# Patient Record
Sex: FEMALE | Race: WHITE | Employment: UNEMPLOYED | URBAN - METROPOLITAN AREA
[De-identification: names, ages, dates, MRNs, and addresses within clinical notes are randomized per-mention and may not be internally consistent; named-entity substitution may affect disease eponyms.]

---

## 2019-09-04 LAB
GLU GEST SCREEN 1HR 50G: 122
HEMOGLOBIN: 11.1 G/DL (ref 11.7–15.7)

## 2019-09-20 ENCOUNTER — PRENATAL OFFICE VISIT (OUTPATIENT)
Dept: MIDWIFE SERVICES | Facility: CLINIC | Age: 34
End: 2019-09-20
Payer: COMMERCIAL

## 2019-09-20 VITALS
WEIGHT: 201.3 LBS | SYSTOLIC BLOOD PRESSURE: 131 MMHG | HEART RATE: 101 BPM | DIASTOLIC BLOOD PRESSURE: 72 MMHG | BODY MASS INDEX: 38.01 KG/M2 | TEMPERATURE: 98 F | HEIGHT: 61 IN

## 2019-09-20 DIAGNOSIS — Z34.93 PRENATAL CARE IN THIRD TRIMESTER: Primary | ICD-10-CM

## 2019-09-20 DIAGNOSIS — F43.10 PTSD (POST-TRAUMATIC STRESS DISORDER): ICD-10-CM

## 2019-09-20 DIAGNOSIS — Z23 NEED FOR TDAP VACCINATION: ICD-10-CM

## 2019-09-20 DIAGNOSIS — F33.1 MODERATE EPISODE OF RECURRENT MAJOR DEPRESSIVE DISORDER (H): ICD-10-CM

## 2019-09-20 PROBLEM — Z34.92 PRENATAL CARE IN SECOND TRIMESTER: Status: ACTIVE | Noted: 2019-09-20

## 2019-09-20 PROCEDURE — 90471 IMMUNIZATION ADMIN: CPT | Performed by: ADVANCED PRACTICE MIDWIFE

## 2019-09-20 PROCEDURE — 99207 ZZC FIRST OB VISIT: CPT | Performed by: ADVANCED PRACTICE MIDWIFE

## 2019-09-20 PROCEDURE — 90715 TDAP VACCINE 7 YRS/> IM: CPT | Performed by: ADVANCED PRACTICE MIDWIFE

## 2019-09-20 RX ORDER — PYRIDOXINE HCL (VITAMIN B6) 25 MG
25 TABLET ORAL DAILY
COMMUNITY
End: 2023-10-21

## 2019-09-20 RX ORDER — PRENATAL VIT/IRON FUM/FOLIC AC 27MG-0.8MG
1 TABLET ORAL DAILY
COMMUNITY
End: 2019-09-30

## 2019-09-20 RX ORDER — ACETAMINOPHEN 500 MG
500 TABLET ORAL EVERY 6 HOURS PRN
Qty: 1 BOTTLE | Refills: 3 | Status: SHIPPED | OUTPATIENT
Start: 2019-09-20 | End: 2023-10-21

## 2019-09-20 RX ORDER — ACETAMINOPHEN 500 MG
500-1000 TABLET ORAL EVERY 6 HOURS PRN
COMMUNITY
End: 2019-09-30

## 2019-09-20 RX ORDER — CHOLECALCIFEROL (VITAMIN D3) 50 MCG
1 TABLET ORAL DAILY
Qty: 90 TABLET | Refills: 3 | Status: SHIPPED | OUTPATIENT
Start: 2019-09-20 | End: 2023-10-21

## 2019-09-20 RX ORDER — PRENATAL VIT/IRON FUM/FOLIC AC 27MG-0.8MG
1 TABLET ORAL DAILY
Qty: 90 TABLET | Refills: 3 | Status: SHIPPED | OUTPATIENT
Start: 2019-09-20 | End: 2019-11-05

## 2019-09-20 RX ORDER — DOCUSATE SODIUM 100 MG/1
100 CAPSULE, LIQUID FILLED ORAL 2 TIMES DAILY
COMMUNITY
End: 2023-10-21

## 2019-09-20 RX ORDER — CITALOPRAM HYDROBROMIDE 20 MG/1
20 TABLET ORAL DAILY
COMMUNITY
End: 2019-10-03

## 2019-09-20 NOTE — PROGRESS NOTES
"is a  single  1 para 0  giving an EDC of 19 by ultrasound and LMP who presents for a transfer of care OB Visit.  She came from the [facility] CNMs.  She heard that our hospital was beautiful with big windows and that is why she transferred. This was not a planned pregnancy.  She is not a previous CNM patient.  FOB is not involved.  She has history of depression and PTSD.  She says that she is in \"crisis\" right now. She is living in supportive mental health housing and receiving regular therapy.  She denies any thoughts or plans of hurting herself or others.  Her main focus is her baby.    =========================================    INFECTION HISTORY  HIV: no  Hepatitis B: no  Hepatitis C: no  Tuberculosis: no    Herpes self: no  Herpes partner:  no  Chlamydia:  no  Gonorrhea:  no  HPV: no  BV:  no  Trichomonis:  no  Syphilis:  no  Chicken Pox:  no  ============================================    PERSONAL/SOCIAL HISTORY  Lives Mental Health Supportive housing through [employer] .  Employment: Unemployed.    =============================================    REVIEW OF SYSTEMS  CONSTITUTIONAL: Denies fever, chills and night sweats  DIET: Appetite is continue.  Eats a regular.    Plans to gain 25-35 pounds with her pregnancy.  SKIN: Denies changes and suspicious moles or lesions.  ENT: Denies blurred vision, hearing loss, tinnitus, frequent colds, epistaxis, hoarseness and tooth pain.    ENDOCRINE: Denies heat and cold intolerance, and polydypsia.    BREASTS:  Tender since LMP.  Denies discharge and lumps.   HEART/LUNGS: Denies dyspnea, wheezing, coughing and chest pain.  HEMATOLOGIC: Denies tendency to bruise and history of blood clots.  GASTROINTESTINAL: Denies heartburn, indigestion and change of color in stools.    GENITOURINARY:  Denies urgency, dysuria and hematuria.  Has frequency of urination since LMP.   NEUROLOGIC:  Denies seizures, weakness and fainting.  PSYCHIATRIC:  Endorses Depression  "   ===========================================    PHYSICAL EXAM  GENERAL:   pleasant pregnant female, alert, cooperative and well groomed.  Obese.  SKIN:  Warm and dry, without lesions or tenderness.    HEAD: Symmetrical features.  EYES:  PERRLA, wears glasses.  EARS: Tympanic membranes gray, translucent and intact.  NOSE: No flaring, patent  MOUTH:  Buccal mucosa pink, moist without lesions.  Teeth in fair repair.    NECK:  Thyroid without enlargement and nodules.  Lymph nodes not palpable.   LUNGS:  Clear to auscultation.  BREAST:  Symmetrical without lesions or nodes.  Nipples everted.  Areolas symmetric.  No palpable axillary nodes.  HEART:  RRR without murmur.  ABDOMEN: Soft without masses or tenderness.  No CVA tenderness.  Uterus palpable at size equal to dates.   Measures size greater than dates due to maternal adipose tissue.    MUSCULOSKELETAL:  Full range of motion  GENITALIA:  Pelvic exam deferred   EXTREMITIES:  2+/2+ DTR, No edema. No significant varicosities.  ===================================================    Prenatal care  Maternal obesity   Depression   PTSD     PLAN:  Instructed on use of triage nurse line and contacting the on call CNM after hours in an emergency.    She has anatomy US at [facility].   Vits and meds ordered for her at  Pharmacy per her request.  Discussed the risks, benefits, side effects and alternative therapies for current prescribed and OTC medications.  Will return to the clinic in 2 weeks for her next routine prenatal check.  Will call to be seen sooner if problem arise.  Continue with mental health treatment and services.  We reviewed to go to ED for suicidal thoughts and plans.  Oriented to CNM Service and added to list.      Yue Robles, JONATHAN, APRN, CNM

## 2019-09-24 ENCOUNTER — PRENATAL OFFICE VISIT (OUTPATIENT)
Dept: MIDWIFE SERVICES | Facility: CLINIC | Age: 34
End: 2019-09-24
Payer: COMMERCIAL

## 2019-09-24 VITALS
DIASTOLIC BLOOD PRESSURE: 75 MMHG | WEIGHT: 201 LBS | SYSTOLIC BLOOD PRESSURE: 138 MMHG | HEART RATE: 106 BPM | TEMPERATURE: 97.1 F | BODY MASS INDEX: 37.98 KG/M2

## 2019-09-24 DIAGNOSIS — Z59.00 HOUSING LACK: ICD-10-CM

## 2019-09-24 DIAGNOSIS — O99.891 BACK PAIN IN PREGNANCY: ICD-10-CM

## 2019-09-24 DIAGNOSIS — M54.9 BACK PAIN IN PREGNANCY: ICD-10-CM

## 2019-09-24 DIAGNOSIS — Z34.93 PRENATAL CARE IN THIRD TRIMESTER: Primary | ICD-10-CM

## 2019-09-24 DIAGNOSIS — F32.1 CURRENT MODERATE EPISODE OF MAJOR DEPRESSIVE DISORDER, UNSPECIFIED WHETHER RECURRENT (H): ICD-10-CM

## 2019-09-24 PROCEDURE — 99207 ZZC PRENATAL VISIT: CPT | Performed by: ADVANCED PRACTICE MIDWIFE

## 2019-09-24 SDOH — ECONOMIC STABILITY - HOUSING INSECURITY: HOMELESSNESS UNSPECIFIED: Z59.00

## 2019-09-24 NOTE — LETTER
[facility]  [address] [place] [id number] [phone]      September 24, 2019      Sveta Cuellar  [address]  [facility] [address]              To Whom It May Concern:    Sveta Cuellar is being seen in our clinic for prenatal care.  Her Estimated Date of Delivery: Nov 30, 2019. Ms. Cuellar is in her 3rd trimester of pregnancy and experiencing insomnia due to stress.   It is safe in the pregnancy to take Unisom  mg at night.  Please allow Ms. Cuellar to take 2-3 tablets of Unisom at nighttime as I feel this would help her stress to have better sleep.  If any questions please call [phone].      Sincerely,        Coco Kincaid CNM

## 2019-09-24 NOTE — PROGRESS NOTES
30w3d  Patient is very stressed today.  Has been in crisis shelter and needs to leave in 2 days, feeling desperate as she does not have anywhere to go.  Pt states not sleeping at all because of stress.  She has called lots of places.  Willing to have  call her for any and all assistance.  Pt having lower abdominal cramping, generalized, some days more than others, given maternity belt to help with this not likely  labor.  Reports some right carpal tunnel that his bothersome using brace at night and tylanol.  Would really like to sleep, letter written for shelter to allow her to take 75 mg unisom for sleep as this has worked in the past. Pt states they will only give her 25 mg and this does nothing.   Care cordination referral given. rtc in 2 weeks to assess stress.  vaishnavi

## 2019-09-24 NOTE — Clinical Note
Aretha, saw this patient at end of the day, she seems very desperate and will be kicked out of shelter in 2 days.  Please call her as soon as you can. I gave her your card too.   Thanks, Coco Kincaid CNM

## 2019-09-26 ENCOUNTER — PATIENT OUTREACH (OUTPATIENT)
Dept: CARE COORDINATION | Facility: CLINIC | Age: 34
End: 2019-09-26

## 2019-09-26 NOTE — PROGRESS NOTES
Clinic Care Coordination Contact    Clinic Care Coordination Contact  OUTREACH    Referral Information:  Referral Source: Specialist    Primary Diagnosis: Psychosocial    Chief Complaint   Patient presents with     Clinic Care Coordination - Initial             Universal Utilization: [facility]   Clinic Utilization  Difficulty keeping appointments:: Yes  Compliance Concerns: Yes  No-Show Concerns: Yes  No PCP office visit in Past Year: No  Utilization    Last refreshed: 9/25/2019 11:55 AM:  Hospital Admissions 0           Last refreshed: 9/25/2019 11:55 AM:  ED Visits 0           Last refreshed: 9/25/2019 11:55 AM:  No Show Count (past year) 1              Current as of: 9/25/2019 11:55 AM              Clinical Concerns:  Current Medical Concerns:  Pt reports that she is 30 weeks pregnant.     Current Behavioral Concerns: Pt discussed there were currently many stressors occurring in her life. Pt shared that she would like to find additional housing resources. She she is getting discharged from one shelter today back to [facility]. Pt shared she is not exactly eager to go back there, but it is better than being out on the streets. Pt shared she is seeking assistance with any resources for her baby going forward.   Education Provided to patient: Pt will be calling pt on 9/26 to discuss more options of resources along with meeting at pt's next in clinic appt on 9/30/19.    Pain  Pain (GOAL):: No  Health Maintenance Reviewed: Due/Overdue   Health Maintenance Due   Topic Date Due     PREVENTIVE CARE VISIT  1985     DEPRESSION ACTION PLAN  1985     PHQ-9  1985     HPV  09/19/2006     MATERNAL SCREENING  06/08/2019     REPEAT ANTIBODY SCREEN (OB)  09/07/2019     RH IMMUNE GLOBULIN (OB)  09/07/2019       Clinical Pathway: None    Medication Management:  Pt has medications managed by PCP.      Functional Status:       Living Situation: Pt is currently  homeless. She will be staying in a shelter for the time being and work towards getting into permanent housing. k       Diet/Exercise/Sleep:  Inadequate nutrition (GOAL):: No  Food Insecurity: No  Tube Feeding: No  Inadequate activity/exercise (GOAL):: No  Significant changes in sleep pattern (GOAL): No    Transportation:  Transportation concerns (GOAL):: No  Transportation means:: Medical transport, Public transportation     Psychosocial:        Financial/Insurance:   Financial/Insurance concerns (GOAL):: No  Pt receives Social Security and SNAP.      Resources and Interventions:  Current Resources:    ;                         Goals:         Patient/Caregiver understanding: Pt will create goals with SW in the clinic.      Outreach Frequency: monthly  Future Appointments              In 4 days [facility] [place]    In 1 week Svitlana Harris [facility]N [facility]    In 1 week [facility]ne Casillas [facility]          Plan: 1) SW will talk with pt over the phone on 9/27.   2) SW will meet with Pt at next clinic appt on 9/30.     YU Chappell  Clinic Care Coordinator   [facility]   [phone]

## 2019-09-26 NOTE — PROGRESS NOTES
Clinic Care Coordination Contact  [facility]/Voicemail    Referral Source: Specialist  Clinical Data: Care Coordinator Outreach  Outreach attempted x 1.  Left message on patient's voicemail with call back information and requested return call.  Plan: Care Coordinator will send care coordination introduction letter with care coordinator contact information and explanation of care coordination services via mail. Care Coordinator will try to reach patient again in 1-2 business days.    YU Chappell  Clinic Care Coordinator   [facility]   [phone]

## 2019-09-30 ENCOUNTER — APPOINTMENT (OUTPATIENT)
Dept: BEHAVIORAL HEALTH | Facility: CLINIC | Age: 34
End: 2019-09-30
Payer: COMMERCIAL

## 2019-09-30 ENCOUNTER — PRENATAL OFFICE VISIT (OUTPATIENT)
Dept: MIDWIFE SERVICES | Facility: CLINIC | Age: 34
End: 2019-09-30
Payer: COMMERCIAL

## 2019-09-30 VITALS
OXYGEN SATURATION: 96 % | BODY MASS INDEX: 38.17 KG/M2 | DIASTOLIC BLOOD PRESSURE: 66 MMHG | WEIGHT: 202 LBS | HEART RATE: 108 BPM | SYSTOLIC BLOOD PRESSURE: 114 MMHG

## 2019-09-30 DIAGNOSIS — G56.01 CARPAL TUNNEL SYNDROME OF RIGHT WRIST: ICD-10-CM

## 2019-09-30 DIAGNOSIS — Z34.02 ENCOUNTER FOR SUPERVISION OF NORMAL FIRST PREGNANCY, SECOND TRIMESTER: Primary | ICD-10-CM

## 2019-09-30 PROCEDURE — 99207 ZZC PRENATAL VISIT: CPT | Performed by: ADVANCED PRACTICE MIDWIFE

## 2019-09-30 NOTE — PROGRESS NOTES
"31w2d  Sveta is here by herself today. Feels that her housing is a bit more stable, back at the shelter now. Meeting with Aretha, care coordinator, today as well. Has a complicated mental health history, says she was recently diagnosed as \"borderline\", but doesn't think she will be able to follow through with the recommended therapy program because she will not be able to find childcare. Referral for [facility] Mother-Baby program placed today with her permission. Also would like to see physical therapy for her carpel tunnel, referral placed. She is feeling nervous about labor and birth. Asking about waterbirth. Discussed that we need to collect Hep C with next blood draw, and gave her a copy of the consent to take home and review (she signed it and left it in the exam room-needs to do again and talk through with a provider). Maternal habitus makes it difficult to assess fundus, growth ultrasound ordered. Has had Tdap and Flu shot. Return to care 2 weeks.  Davie Hernandez CNM     "

## 2019-10-01 ENCOUNTER — PATIENT OUTREACH (OUTPATIENT)
Dept: CARE COORDINATION | Facility: CLINIC | Age: 34
End: 2019-10-01

## 2019-10-01 ASSESSMENT — ACTIVITIES OF DAILY LIVING (ADL): DEPENDENT_IADLS:: INDEPENDENT

## 2019-10-01 NOTE — PROGRESS NOTES
Clinic Care Coordination Contact    Follow Up Progress Note      Assessment: SW met with Pt in the clinic. Pt stated that things are going alright. She feels she is safe in her current home environment in a shelter, but it is not a long term solution as she is due in 2.5 months. Pt stated she would like to continue working to find a place and additional resources for her baby before it comes. Pt shared she was cut off of [employer] [employer] so she needs to go back and fix the issue.       Goals addressed this encounter:   Goals Addressed                 This Visit's Progress       Patient Stated      #1 Psychosocial (pt-stated)        Goal Statement: I need to find stable housing   Measure of Success: Pt will find stable housing   Supportive Steps to Achieve: Care coordination   Barriers: Expecting a baby in 2 months.   Strengths: Asking for help   Date to Achieve By: 11/30/19  Patient expressed understanding of goal: yes                 Intervention/Education provided during outreach: SW talked with Pt about additional services. SW put in a referral for [facility] which is for pregnant women. SW suggested contacting [facility] and getting back in to start receiving services. SW will continue working with pt on getting more pregnancy resources before her Baby comes.           Plan:   Pt will contact [facility]  Pt will continue looking at places to live.   Care Coordinator will follow up in 2 weeks.     YU Chappell  Clinic Care Coordinator   [facility]   [phone]

## 2019-10-02 NOTE — PROGRESS NOTES
Hand Therapy Initial Evaluation  Current Date:  10/4/2019    Diagnosis: R Carpal tunnel syndrome  DOI: ~September 2019 (9/30/2019 order date)    Referring provider: Davie Hernandez CNM    Subjective:  Sveta Cuellar is a 34 year old female.    Answers for HPI/ROS submitted by the patient on 10/4/2019   History Reported by Patient  Reason for Visit:: carpal tunnel and tendonitius  When problem began:: 9/1/2019  Where?: other  How problem occurred:: due to pregnancy  Number scale: 8/10  Pain quality: aching, other  Frequency: intermittent  Pain is: the same all the time  Progression since onset: gradually worsening  Special tests: other  Previous treatment: other  General health as reported by patient: fair  Please check all that apply to your current or past medical history: depression, mental illness, overweight, currently pregnant, sleep disorder/apnea, other  Other Med Hx Detail: insomnia  Surgeries: none, other  Other Surgery Detail: hemmroids  Medications you are currently taking: anti-depressants, sleep medication, other  Other Meds Detail: prenatal  Occupation:: none  Barriers at home/work: stairs  Red flags: numbness in perianal region, pain at rest/night    Current occupation is unemployed, wants to go to [employer]    Occupational Profile Information:  Right hand dominant  Prior functional level:  independent-shared household chores  Patient reports symptoms of pain, numbness and tingling   Barriers include: transportation  Mobility: No difficulty  Transportation: public transportation  Leisure activities/hobbies: Going to the mall, sightseeing, Netflix, phone use  Other: Pt is 7 months pregnant, due at end of November    Note: Complained of pain in L wrist only. No tx provided. [employer] message sent to provider to update orders.    Functional Outcome Measure:   Upper Extremity Functional Index Score:  SCORE:   Column Totals: /80: (P) 67   (A lower score indicates greater  disability.)    Objective:  Pain Level (Scale 0-10):   10/4/2019   At Rest 5   With Use 5     Pain Description:  Date 10/4/2019   Location wrist and hand   Pain Quality Aching, Dull, Numb, Tingling and cramping   Frequency constant     Pain is worst  daytime   Exacerbated by  Sleep, carrying items   Relieved by Braces, but braces contribute to numbness d/t being too tight, NSAIDs   Progression Gradually worsening     Edema:  None  Sensation:  Decreased Median Nerve distribution per pt report and Constantly    ROM  Pain Report: - none  + mild    ++ moderate    +++ severe   Wrist 10/4/2019 10/4/2019   AROM (PROM) R L   Extension 70 69   Flexion 68 70, +   RD 16 14   UD 32 30   Supination WNL WNL   Pronation WNL WNL     Special Tests:  Pain Report:  - none    + mild    ++ moderate    +++ severe   Date 10/4/2019   Side R   Phalens + at 18 seconds   Tinels at CT - on L, + on R   Tinels at Pronator -   Median Nerve ULTT 4/5 on L, 2/5 on R wincing   Paresthesias +     Strength   (Measured in pounds)  Pain Report: - none  + mild    ++ moderate    +++ severe    10/4/2019 10/4/2019   Trials R L   1  2  3 34 23 +   Average R L     Lat Pinch 10/4/2019 10/4/2019   Trials R L   1  2  3 13 18   Average 13 18     3 Pt Pinch 10/4/2019 10/4/2019   Trials R L   1  2  3 12 + 10 +   Average 12 10     Assessment:  Patient presents with symptoms consistent with diagnosis of R Carpal tunnel syndrome,  with conservative intervention.     Patient's limitations or Problem List includes:  Pain, Sensory disturbance, Decreased  and Decreased pinch of the right wrist, hand, index finger, long finger and ring finger which interferes with the patient's ability to perform Self Care Tasks (eating), Sleep Patterns, Recreational Activities and Household Chores as compared to previous level of function.    Rehab Potential:  Excellent - Return to full activity, no limitations    Patient will benefit from skilled Occupational Therapy to increase  overall strength and sensation and decrease pain and parasthesias to return to previous activity level and resume normal daily tasks and to reach their rehab potential.    Barriers to Learning:  Mental Health    Communication Issues:  Patient appears to be able to clearly communicate and understand verbal and written communication and follow directions correctly.    Chart Review: Chart Review, Brief history including review of medical and/or therapy records relating to the presenting problem and Simple history review with patient    Identified Performance Deficits: dressing, home establishment and management, meal preparation and cleanup, shopping and sleep    Assessment of Occupational Performance:  3-5 Performance Deficits    Clinical Decision Making (Complexity): Low complexity    Treatment Explanation:  The following has been discussed with the patient:  RX ordered/plan of care  Anticipated outcomes  Possible risks and side effects    Plan:  Frequency:  1 X week, once daily  Duration:  for 6 visits    Treatment Plan:    Modalities:  US  Therapeutic Exercise:  AROM, PROM, Tendon Gliding, Isotonics, Isometrics and Stabilization  Neuromuscular re-education:  Nerve Gliding, Coordination/Dexterity, Sensory re-education, Desensitization, Kinesthetic Training, Proprioceptive Training, Kinesiotaping, Isometrics and Stabilization  Manual Techniques:  Coordination/Dexterity, Friction massage and Myofascial release  Orthotic Fabrication:  Forearm based orthosis  Self Care:  Self Care Tasks, Community Transportation and Work Tasks  Discharge Plan:  Achieve all LTG.  Independent in home treatment program.  Reach maximal therapeutic benefit.    Home Exercise Program:  Wrist cock-up orthosis  Tendon gliding fist series  Forearm PROM flexion/extension  Median nerve gliding    Next Visit:  Check fit of orthosis  MFR - flexors  Passive median nerve glide  Review HEP  Consider adding ball massage per pt tolerance

## 2019-10-03 ENCOUNTER — OFFICE VISIT (OUTPATIENT)
Dept: FAMILY MEDICINE | Facility: CLINIC | Age: 34
End: 2019-10-03
Payer: COMMERCIAL

## 2019-10-03 VITALS
RESPIRATION RATE: 20 BRPM | WEIGHT: 205 LBS | BODY MASS INDEX: 38.71 KG/M2 | SYSTOLIC BLOOD PRESSURE: 116 MMHG | OXYGEN SATURATION: 96 % | TEMPERATURE: 97.8 F | HEIGHT: 61 IN | DIASTOLIC BLOOD PRESSURE: 60 MMHG | HEART RATE: 112 BPM

## 2019-10-03 DIAGNOSIS — F33.9 EPISODE OF RECURRENT MAJOR DEPRESSIVE DISORDER, UNSPECIFIED DEPRESSION EPISODE SEVERITY (H): Primary | ICD-10-CM

## 2019-10-03 DIAGNOSIS — N39.3 FEMALE STRESS INCONTINENCE: ICD-10-CM

## 2019-10-03 DIAGNOSIS — G47.00 INSOMNIA, UNSPECIFIED TYPE: ICD-10-CM

## 2019-10-03 PROCEDURE — 99203 OFFICE O/P NEW LOW 30 MIN: CPT | Performed by: NURSE PRACTITIONER

## 2019-10-03 RX ORDER — CITALOPRAM HYDROBROMIDE 40 MG/1
40 TABLET ORAL DAILY
Qty: 90 TABLET | Refills: 1 | Status: SHIPPED | OUTPATIENT
Start: 2019-10-03 | End: 2019-12-05

## 2019-10-03 ASSESSMENT — MIFFLIN-ST. JEOR: SCORE: 1567.25

## 2019-10-03 NOTE — PROGRESS NOTES
SUBJECTIVE:  Sveta Cuellar, a 34 year old female, here today for an appointment to establish care and to discuss the following issues:    Depression and Anxiety Follow-Up    How are you doing with your depression since your last visit? About the same    How are you doing with your anxiety since your last visit?  Worsened , has noticed more panic symptoms during the day    Are you having other symptoms that might be associated with depression or anxiety? No    Have you had a significant life event? OTHER: experiencing homelessness (staying in a shelter until she delivers) nephew commited suicide in the spring     Do you have any concerns with your use of alcohol or other drugs? No     She has been experiencing more anxiety in the past few weeks. Ongoing trouble sleeping for several years. Saw psychiatrist last week but she forgot to mention anxiety issues, and does not psych follow-up for another several months. She has been counselor regularly. She plans to enroll in a [facility] Mother-baby psych program that provides  while moms receive counseling services. She feels like she does not have adequate time for DBT right now. Her father will be coming in from [place] to help with the baby after she delivers.     Social History     Tobacco Use     Smoking status: Never Smoker     Smokeless tobacco: Never Used   Substance Use Topics     Alcohol use: None     Drug use: None     PHQ 10/3/2019   Q9: Thoughts of better off dead/self-harm past 2 weeks Not at all     No flowsheet data found.  No flowsheet data found.      Suicide Assessment Five-step Evaluation and Treatment (SAFE-T)    HPI:    Where was your previous physician's office?: [facility]  Physician's Name: Dr. Joe Pozo  Last physical: unsure  Pt permission to access these records? (Care Everywhere or DIAN): yes     If patient has been seen at a [facility] before, please disregard the question below.      History reviewed. No pertinent past medical  "history.    History reviewed. No pertinent surgical history.    History reviewed. No pertinent family history.    Social History     Tobacco Use     Smoking status: Never Smoker     Smokeless tobacco: Never Used   Substance Use Topics     Alcohol use: Not on file       ROS:  CV: NEGATIVE for chest pain, palpitations   GI: NEGATIVE for nausea, abdominal pain, heartburn, or change in bowel habits   female: incontinence-stress  C: NEGATIVE for fever, chills  E: NEGATIVE for vision changes   R: NEGATIVE for significant cough or SOB  M: NEGATIVE for significant arthralgias or myalgia  N: NEGATIVE for weakness, dizziness or paresthesias or headache    OBJECTIVE:    /60   Pulse 112   Temp 97.8  F (36.6  C) (Temporal)   Resp 20   Ht 1.549 m (5' 1\")   Wt 93 kg (205 lb)   LMP 02/23/2019   SpO2 96%   BMI 38.73 kg/m      EXAM:  GENERAL APPEARANCE: healthy, alert and no distress  EYES: EOMI,  PERRL  HENT: ear canals and TM's normal and nose and mouth without ulcers or lesions  RESP: lungs clear to auscultation - no rales, rhonchi or wheezes  CV: regular rates and rhythm, normal S1 S2, no S3 or S4 and no murmur, click or rub -  ABDOMEN:  soft, nontender, no HSM or masses and bowel sounds normal  PSYCH: mentation appears normal and affect normal/bright; crying when discussing nephew's death    ASSESSMENT/PLAN:      ICD-10-CM    1. Episode of recurrent major depressive disorder, unspecified depression episode severity (H) F33.9 citalopram (CELEXA) 40 MG tablet   2. Insomnia, unspecified type G47.00 doxylamine (UNISOM) 25 MG TABS tablet   3. Female stress incontinence N39.3      -increase Celexa to 40 mg to better control anxiety symptoms.  Psychiatrist recommended limit of 50 mg for Unisom, so this was refilled today at that dose. Discussed other relaxation taehniques and interventions for sleep today.   -discussed Kegel exercises for stress incontinence symptoms  -follow 1 month  "

## 2019-10-04 ENCOUNTER — THERAPY VISIT (OUTPATIENT)
Dept: OCCUPATIONAL THERAPY | Facility: CLINIC | Age: 34
End: 2019-10-04
Payer: COMMERCIAL

## 2019-10-04 DIAGNOSIS — G56.01 CARPAL TUNNEL SYNDROME OF RIGHT WRIST: ICD-10-CM

## 2019-10-04 PROCEDURE — 97110 THERAPEUTIC EXERCISES: CPT | Mod: GO | Performed by: OCCUPATIONAL THERAPIST

## 2019-10-04 PROCEDURE — 97165 OT EVAL LOW COMPLEX 30 MIN: CPT | Mod: GO | Performed by: OCCUPATIONAL THERAPIST

## 2019-10-04 PROCEDURE — 97760 ORTHOTIC MGMT&TRAING 1ST ENC: CPT | Mod: GO | Performed by: OCCUPATIONAL THERAPIST

## 2019-10-07 DIAGNOSIS — G56.03 BILATERAL CARPAL TUNNEL SYNDROME: Primary | ICD-10-CM

## 2019-10-10 ENCOUNTER — PRENATAL OFFICE VISIT (OUTPATIENT)
Dept: MIDWIFE SERVICES | Facility: CLINIC | Age: 34
End: 2019-10-10
Payer: COMMERCIAL

## 2019-10-10 VITALS
BODY MASS INDEX: 38.36 KG/M2 | DIASTOLIC BLOOD PRESSURE: 78 MMHG | SYSTOLIC BLOOD PRESSURE: 133 MMHG | HEART RATE: 111 BPM | WEIGHT: 203 LBS

## 2019-10-10 DIAGNOSIS — Z34.83 ENCOUNTER FOR SUPERVISION OF OTHER NORMAL PREGNANCY, THIRD TRIMESTER: Primary | ICD-10-CM

## 2019-10-10 DIAGNOSIS — G56.03 BILATERAL CARPAL TUNNEL SYNDROME: ICD-10-CM

## 2019-10-10 PROCEDURE — 99207 ZZC PRENATAL VISIT: CPT | Performed by: ADVANCED PRACTICE MIDWIFE

## 2019-10-10 NOTE — PROGRESS NOTES
32w5d  Patient feeling well. Positive fetal movement. Denies water leaking, vaginal bleeding, decreased fetal movement, contraction pain, or headaches.   Patient reports she is really stressed and has a hard time sleeping at night. Living in a shelter and hears a boy cry out when in the bathroom with his mother. Feels like he is getting abused. Did not report it. Does not feel like she can. Seeing her therapist every other week. Cancelled her last one due to transportation. Has medical cabs available to use. Was seen at [facility] today, getting connected with community resources. Was recommend for a specific treatment, cannot remember what it was called, but it was a year long treatment and she does not feel like she can commit to that. Has missed the calls from [facility] mother baby program. Will have Aretha help her reconnect. Would also like resources for a car seat. Will talk with [facility] as well. Denies feelings of wanting to hurt herself, just stressed. No pregnancy concerns. Has ultrasound for growth next week. Hard to feel baby or do measurements due to body habitus. Feels breech today, head in RUQ, heart heart in LUQ. Feels lots of movements from baby. Seeing physical therapy for her carpel tunnel in her right hand, needs a referral for them to assess and evaluate both hands, referral for left hand sent as well. No other questions today.    Danger signs reviewed, pre-eclampsia signs and symptoms discussed.   Knows when to call triage and has phone numbers.   Follow up as scheduled for growth ultrasound and follow up PNV.    XIAO Perez CNM

## 2019-10-11 ENCOUNTER — THERAPY VISIT (OUTPATIENT)
Dept: OCCUPATIONAL THERAPY | Facility: CLINIC | Age: 34
End: 2019-10-11
Attending: ADVANCED PRACTICE MIDWIFE
Payer: COMMERCIAL

## 2019-10-11 DIAGNOSIS — M25.532 LEFT WRIST PAIN: Primary | ICD-10-CM

## 2019-10-11 DIAGNOSIS — G56.03 BILATERAL CARPAL TUNNEL SYNDROME: ICD-10-CM

## 2019-10-11 DIAGNOSIS — M77.8 TENDINITIS OF LEFT WRIST: ICD-10-CM

## 2019-10-11 PROCEDURE — 97165 OT EVAL LOW COMPLEX 30 MIN: CPT | Mod: GO | Performed by: OCCUPATIONAL THERAPIST

## 2019-10-11 PROCEDURE — 97110 THERAPEUTIC EXERCISES: CPT | Mod: GO | Performed by: OCCUPATIONAL THERAPIST

## 2019-10-11 PROCEDURE — 97760 ORTHOTIC MGMT&TRAING 1ST ENC: CPT | Mod: GO | Performed by: OCCUPATIONAL THERAPIST

## 2019-10-11 NOTE — PROGRESS NOTES
Hand Therapy Initial Evaluation  Current Date:  10/11/2019      Diagnosis: R Carpal tunnel syndrome  New DX: Bilateral Wrist pain, B Carpal Tunnel Syndrom    DOI: ~September 2019 (9/30/2019 order date)    Referring provider: Davie Hernandez CNM    Initial Subjective:  Sveta Cuellar is a 34 year old female.  Pt has new orders for the left wrist. Pain with wrist motion and thumb motion.    Patient reports symptoms of pain, stiffness/loss of motion, weakness/loss of strength, edema, numbness and tingling  of the right and left  wrist and thumb  which occurred due to pregnancy. Since onset symptoms are Gradually getting worse..  Special tests:  none.  Previous treatment: wrist splint for the left hand/wrist.    General health as reported by patient is good.    Answers for HPI/ROS submitted by the patient on 10/4/2019   History Reported by Patient  Reason for Visit:: carpal tunnel and tendonitius  When problem began:: 9/1/2019  Where?: other  How problem occurred:: due to pregnancy  Number scale: 8/10  Pain quality: aching, other  Frequency: intermittent  Pain is: the same all the time  Progression since onset: gradually worsening  Special tests: other  Previous treatment: other  General health as reported by patient: fair  Please check all that apply to your current or past medical history: depression, mental illness, overweight, currently pregnant, sleep disorder/apnea, other  Other Med Hx Detail: insomnia  Surgeries: none, other  Other Surgery Detail: hemmroids  Medications you are currently taking: anti-depressants, sleep medication, other  Other Meds Detail: prenatal  Occupation:: none  Barriers at home/work: stairs  Red flags: numbness in perianal region, pain at rest/night    Current occupation is unemployed, wants to go to [employer] school    Occupational Profile Information:  Right hand dominant  Prior functional level:  independent-shared household chores  Patient reports symptoms of pain, numbness and  tingling   Barriers include: transportation  Mobility: No difficulty  Transportation: public transportation  Leisure activities/hobbies: Going to the mall, sightseeing, Netflix, phone use  Other: Pt is 7 months pregnant, due at end of November      Functional Outcome Measure:   Upper Extremity Functional Index Score:  SCORE:   Column Totals: /80: (P) 67   (A lower score indicates greater disability.)    Objective:  Pain Level (Scale 0-10):   10/4/2019 10/11/19     Left hand   At Rest 5 6   With Use 5 9     Pain Description:  Date 10/4/2019 10/11/19   Location wrist and hand (reporting on the Right hand ) Left hand   Pain Quality Aching, Dull, Numb, Tingling and cramping Pain and shooting pain    Frequency constant   constant   Pain is worst  daytime Day and linda AM   Exacerbated by  Sleep, carrying items worse with bending the wrist.  And carrying   Relieved by Braces, but braces contribute to numbness d/t being too tight, NSAIDs Icing only, only had a wrist OTC brace   Progression Gradually worsening Gradually worsening     Edema:  None  Sensation: BUE  Decreased Median Nerve distribution per pt report and Constantly    ROM  Pain Report: - none  + mild    ++ moderate    +++ severe   Wrist 10/4/2019 10/4/2019 10/11/19   AROM (PROM) R L L:   Extension 70 69 70+   Flexion 68 70, + 75   RD 16 14 14++   UD 32 30 15++   Supination WNL WNL WNL   Pronation WNL WNL WNL     Special Tests:  Pain Report:  - none    + mild    ++ moderate    +++ severe   Date 10/4/2019   Side R   Phalens + at 18 seconds   Tinels at CT - on L, + on R   Tinels at Pronator -   Median Nerve ULTT 4/5 on L, 2/5 on R wincing   Paresthesias +   Special Tests: MN/UN/RN Wrist & Elbow   + indicates mild pain, ++ indicates moderate pain, +++ indicates severe pain   10/11/2019   +/- for pain: grade of pain 0-10/10 Left   Tinels at Carpal Tunnel +   Tinels at flexor/pronator junction -   Tinels at cubital tunnel -   Tinels at [place] -   Median nerve  compression at wrist Carpal Tunnel +   Phalens 60' +   MN: Gupta test for lumbrical incursion  (fist x 30 secs) +   Wrist    1st DC palpation +   WHAT test -   Finkelstein/Eichoff's +   Resisted EPB +   Resisted APL -       Strength   (Measured in pounds)  Pain Report: - none  + mild    ++ moderate    +++ severe    10/4/2019 10/4/2019 10/11   Trials R L :L   1  2  3 34 23 + 23++   Average R L      Lat Pinch 10/4/2019 10/4/2019 10/11/19   Trials R L L   1  2  3 13 18 18++   Average 13 18      3 Pt Pinch 10/4/2019 10/4/2019 10/11/19   Trials R L L   1  2  3 12 + 10 + 10++   Average 12 10        Assessment:   Patient presents with symptoms consistent with above diagnosis,  with non-surgical intervention.     Patient's limitations or Problem List includes:  Pain, Decreased ROM/motion, Increased edema, Weakness, Decreased stability, Decreased  and Decreased pinch of the right and left wrist and thumb which interferes with the patient's ability to perform Self Care Tasks (dressing, eating, bathing, hygiene/toileting), Work Tasks, Sleep Patterns and Household Chores as compared to previous level of function.  Rehab Potential:  Excellent - Return to full activity, no limitations  Patient will benefit from skilled Occupational Therapy to increase ROM, flexibility, motion, stability of wrist and stability of thumb and decrease pain and adherence of scarring to return to previous activity level and resume normal daily tasks and to reach their rehab potential.  Barriers to Learning:  No barrier  Communication Issues:  Patient appears to be able to clearly communicate and understand verbal and written communication and follow directions correctly.  Chart Review: Chart Review, Brief history including review of medical and/or therapy records relating to the presenting problem and Simple history review with patient    Identified Performance Deficits: bathing/showering, dressing, hygiene and grooming, driving and community  mobility, home establishment and management, meal preparation and cleanup, sleep and work    Assessment of Occupational Performance:  5 or more Performance Deficits    Clinical Decision Making (Complexity): Low complexity      Treatment Explanation:  The following has been discussed with the patient:  RX ordered/plan of care  Anticipated outcomes  Possible risks and side effects        Plan:  Frequency:  1 X week, once daily  Duration:  for 6 visits    Treatment Plan:    Modalities:  US  Therapeutic Exercise:  AROM, PROM, Tendon Gliding, Isotonics, Isometrics and Stabilization  Neuromuscular re-education:  Nerve Gliding, Coordination/Dexterity, Sensory re-education, Desensitization, Kinesthetic Training, Proprioceptive Training, Kinesiotaping, Isometrics and Stabilization  Manual Techniques:  Coordination/Dexterity, Friction massage and Myofascial release  Orthotic Fabrication:  Forearm based orthosis  Self Care:  Self Care Tasks, Community Transportation and Work Tasks  Discharge Plan:  Achieve all LTG.  Independent in home treatment program.  Reach maximal therapeutic benefit.    Home Exercise Program:  Wrist cock-up orthosis  LUE: Thumb spica FA orthosis  Tendon gliding fist series  Isolated tendon gliding  Forearm PROM flexion/extension  Median nerve gliding  Wrist and thumb AROM for LUE    Next Visit:  Check fit of orthosis  MFR - flexors  Passive median nerve glide  Review HEP  Consider adding ball massage per pt tolerance

## 2019-10-12 ENCOUNTER — NURSE TRIAGE (OUTPATIENT)
Dept: NURSING | Facility: CLINIC | Age: 34
End: 2019-10-12

## 2019-10-13 ENCOUNTER — HOSPITAL ENCOUNTER (OUTPATIENT)
Facility: CLINIC | Age: 34
Discharge: HOME | End: 2019-10-13
Attending: ADVANCED PRACTICE MIDWIFE | Admitting: ADVANCED PRACTICE MIDWIFE
Payer: COMMERCIAL

## 2019-10-13 VITALS — RESPIRATION RATE: 22 BRPM | DIASTOLIC BLOOD PRESSURE: 81 MMHG | SYSTOLIC BLOOD PRESSURE: 117 MMHG | TEMPERATURE: 97.8 F

## 2019-10-13 DIAGNOSIS — B37.31 YEAST INFECTION OF THE VAGINA: Primary | ICD-10-CM

## 2019-10-13 DIAGNOSIS — N30.00 ACUTE CYSTITIS WITHOUT HEMATURIA: ICD-10-CM

## 2019-10-13 PROBLEM — O23.43 URINARY TRACT INFECTION IN MOTHER DURING THIRD TRIMESTER OF PREGNANCY: Status: ACTIVE | Noted: 2019-10-13

## 2019-10-13 LAB
ALBUMIN UR-MCNC: NEGATIVE MG/DL
AMORPH CRY #/AREA URNS HPF: ABNORMAL /HPF
AMPHETAMINES UR QL SCN: NEGATIVE
APPEARANCE UR: ABNORMAL
BACTERIA #/AREA URNS HPF: ABNORMAL /HPF
BILIRUB UR QL STRIP: NEGATIVE
CANNABINOIDS UR QL: NEGATIVE
COCAINE UR QL: NEGATIVE
COLOR UR AUTO: ABNORMAL
GLUCOSE UR STRIP-MCNC: NEGATIVE MG/DL
HGB UR QL STRIP: NEGATIVE
KETONES UR STRIP-MCNC: NEGATIVE MG/DL
LEUKOCYTE ESTERASE UR QL STRIP: ABNORMAL
MUCOUS THREADS #/AREA URNS LPF: PRESENT /LPF
NITRATE UR QL: NEGATIVE
OPIATES UR QL SCN: NEGATIVE
PCP UR QL SCN: NEGATIVE
PH UR STRIP: 6.5 PH (ref 5–7)
RBC #/AREA URNS AUTO: 2 /HPF (ref 0–2)
SOURCE: ABNORMAL
SP GR UR STRIP: 1.01 (ref 1–1.03)
SPECIMEN SOURCE: ABNORMAL
SQUAMOUS #/AREA URNS AUTO: 2 /HPF (ref 0–1)
TRANS CELLS #/AREA URNS HPF: <1 /HPF (ref 0–1)
UROBILINOGEN UR STRIP-MCNC: NORMAL MG/DL (ref 0–2)
WBC #/AREA URNS AUTO: 37 /HPF (ref 0–5)
WET PREP SPEC: ABNORMAL

## 2019-10-13 PROCEDURE — 81001 URINALYSIS AUTO W/SCOPE: CPT | Performed by: ADVANCED PRACTICE MIDWIFE

## 2019-10-13 PROCEDURE — 87086 URINE CULTURE/COLONY COUNT: CPT | Performed by: ADVANCED PRACTICE MIDWIFE

## 2019-10-13 PROCEDURE — 87210 SMEAR WET MOUNT SALINE/INK: CPT | Performed by: ADVANCED PRACTICE MIDWIFE

## 2019-10-13 PROCEDURE — 80307 DRUG TEST PRSMV CHEM ANLYZR: CPT | Performed by: ADVANCED PRACTICE MIDWIFE

## 2019-10-13 PROCEDURE — 99213 OFFICE O/P EST LOW 20 MIN: CPT | Mod: 25 | Performed by: ADVANCED PRACTICE MIDWIFE

## 2019-10-13 PROCEDURE — G0463 HOSPITAL OUTPT CLINIC VISIT: HCPCS

## 2019-10-13 PROCEDURE — 59025 FETAL NON-STRESS TEST: CPT | Mod: 26 | Performed by: ADVANCED PRACTICE MIDWIFE

## 2019-10-13 PROCEDURE — 87591 N.GONORRHOEAE DNA AMP PROB: CPT | Performed by: ADVANCED PRACTICE MIDWIFE

## 2019-10-13 PROCEDURE — 87491 CHLMYD TRACH DNA AMP PROBE: CPT | Performed by: ADVANCED PRACTICE MIDWIFE

## 2019-10-13 PROCEDURE — 59025 FETAL NON-STRESS TEST: CPT

## 2019-10-13 RX ORDER — NITROFURANTOIN 25; 75 MG/1; MG/1
100 CAPSULE ORAL 2 TIMES DAILY
Qty: 10 CAPSULE | Refills: 0 | Status: ON HOLD | OUTPATIENT
Start: 2019-10-13 | End: 2019-10-31

## 2019-10-13 RX ORDER — TERCONAZOLE 0.4 %
1 CREAM WITH APPLICATOR VAGINAL AT BEDTIME
Qty: 45 G | Refills: 0 | Status: ON HOLD | OUTPATIENT
Start: 2019-10-13 | End: 2019-10-31

## 2019-10-13 RX ORDER — ONDANSETRON 2 MG/ML
4 INJECTION INTRAMUSCULAR; INTRAVENOUS EVERY 6 HOURS PRN
Status: DISCONTINUED | OUTPATIENT
Start: 2019-10-13 | End: 2019-10-13 | Stop reason: HOSPADM

## 2019-10-13 NOTE — PROGRESS NOTES
Fetal Non-Stress Test Results    NST Ordered By: Davie Hernandez CNM       NST Start & Stop Times  NST Start Time: 0015  NST Stop Time: 0110    NST Results  Fetus A   Baseline Rate: 125  Accelerations: Present  Decelerations: None  Interpretation: reactive    Davie Hernandez CNM

## 2019-10-13 NOTE — DISCHARGE INSTRUCTIONS
Discharge Instruction for Undelivered Patients      You were seen for: Labor Assessment  We Consulted: GERARDO Hernandez CNM  You had (Test or Medicine):fetal monitoring and labs     Diet:   You may eat meals and snacks.     Activity:  Count fetal kicks everyday (see handout)     Call your provider if you notice:  Swelling in your face or increased swelling in your hands or legs.  Headaches that are not relieved by Tylenol (acetaminophen).  Changes in your vision (blurring: seeing spots or stars.)  Nausea (sick to your stomach) and vomiting (throwing up).   Weight gain of 5 pounds or more per week.  Heartburn that doesn't go away.  Signs of bladder infection: pain when you urinate (use the toilet), need to go more often and more urgently.  The bag of schmitt (rupture of membranes) breaks, or you notice leaking in your underwear.  Bright red blood in your underwear.  Abdominal (lower belly) or stomach pain.  For first baby: Contractions (tightening) less than 5 minutes apart for one hour or more.  Second (plus) baby: Contractions (tightening) less than 10 minutes apart and getting stronger.  *If less than 34 weeks: Contractions (tightenings) more than 6 times in one hour.  Increase or change in vaginal discharge (note the color and amount)  Other: Fill and take prescriptions as explained by midwife.    Follow-up:  As scheduled in the clinic

## 2019-10-13 NOTE — PROVIDER NOTIFICATION
10/13/19 0015   Provider Notification   Provider Name/Title David SALDAÑA    Method of Notification At Bedside   Request Evaluate in Person   Notification Reason Patient Arrived

## 2019-10-13 NOTE — PROGRESS NOTES
Data: [facility]   Reason for maternal/fetal assessment per patient is No chief complaint on file.  . Patient is a . Prenatal record reviewed.      OB History    Para Term  AB Living   1 0 0 0 0 0   SAB TAB Ectopic Multiple Live Births   0 0 0 0 0      # Outcome Date GA Lbr Eric/2nd Weight Sex Delivery Anes PTL Lv   1 Current               Medical History: No past medical history on file.. Gestational Age 33w1d. VSS. Cervix: posterior and closed.  Fetal movement present. Patient denies cramping, backache, vaginal discharge, pelvic pressure, UTI symptoms, GI problems, bloody show, vaginal bleeding, edema, headache, visual disturbances, epigastric or URQ pain and rupture of membranes. Patient states she has left sided pain that is sharp and comes and goes.    Action: Verbal consent for EFM. Triage assessment completed. EFM applied for reactive NST. Uterine assessment shows no contractions and unable to palpate contractions. Fetal assessment: Presumed adequate fetal oxygenation documented (see flow record). . Patient instructed to report change in fetal movement, vaginal leaking of fluid or bleeding, abdominal pain, or any concerns related to the pregnancy to her nurse/physician.   Response: PIPER Hernandez informed of patient arrival.   Plan per provider is to collect wet prep, GC chlamydia, UA/UC,and  Utox. Patient verbalized understanding and was in agreement with plan. Discharged ambulatory at 0220.

## 2019-10-13 NOTE — H&P
"HOSPITAL TRIAGE NOTE  ===================    CHIEF COMPLAINT  ========================  Sveta Cuellar is a 34 year old patient presenting today at 33w1d for evaluation of abdominal pain.    Patient's last menstrual period was 2019.  Estimated Date of Delivery: 2019     HPI  ==================   Sveta arrived via ambulance for RLQ abdominal pain. Describes it as \"menstrual-type cramping\" but only in the right groin. Started about an hour prior to arrival, and was constant for about 20 min, now coming and going. Is improved since arriving at the hospital. Denies bleeding, leaking of fluid, RUQ pain, headache. Baby has been very active. Sveta lives in a crisis shelter, and has a history of PTSD and depression. Feels that she is having more anxiety than normal right now.   CONTRACTIONS: cramping  ABDOMINAL PAIN: cramping  FETAL MOVEMENT: active    VAGINAL BLEEDING: none  RUPTURE OF MEMBRANES: no  PELVIC PAIN: none    REVIEW OF SYSTEMS  =====================  C: NEGATIVE for fever, chills  E: NEGATIVE for vision changes or irritation  R: NEGATIVE for significant cough or SOB  CV: NEGATIVE for chest pain, palpitations or varicosities  GI: NEGATIVE for nausea, abdominal pain, heartburn, or change in bowel habits  : NEGATIVE for frequency, dysuria, or hematuria  N: NEGATIVE for headache, weakness, dizziness or paresthesias  P: NEGATIVE for changes in mood or affect    HISTORIES  ==============  ALLERGIES:      Allergies   Allergen Reactions     Wellbutrin [Bupropion]      Nausea and dizziness     PAST MEDICAL HISTORY  History reviewed. No pertinent past medical history.  PARTNER: not involved  FAMILY HISTORY  History reviewed. No pertinent family history.  OB HISTORY  OB History    Para Term  AB Living   1 0 0 0 0 0   SAB TAB Ectopic Multiple Live Births   0 0 0 0 0      # Outcome Date GA Lbr Eric/2nd Weight Sex Delivery Anes PTL Lv   1 Current                EXAM  ============  BP " 117/81   Temp 97.8  F (36.6  C) (Oral)   Resp 22   LMP 2019   GENERAL APPEARANCE: crying, over weight and sleepy; dosing off during exam  RESP: lungs clear to auscultation - no rales, rhonchi or wheezes  CV: regular rates and rhythm, normal S1 S2, no S3 or S4 and no murmur,and no varicosities  ABDOMEN:  soft, nontender,non-tender between contractions no epigastric pain, obese  NEURO: Denies headache, blurred vision  PSYCH: affect normal/bright and fatigued  Pelvic Exam:  Vulva: No external lesions, normal hair distribution, no adenopathy  Vagina: Moist, large amt of thick yellow discharge, well rugated, no lesions; vaginal walls reddened and bleed easily  Cervix: smooth, pink, no visible lesions, appears closed  LEGS: Reflexes normal bilaterally    CONTRACTIONS:irritability; difficult to assess due to maternal habitus  FETAL HEART TONES:  125 with moderate variability, accelerations-yes, decels none.  NST: REACTIVE  CST: NOT DONE  EFW:<3500g; difficult to palpate due to maternal habitus    PELVIC EXAM: closed/ long/ Posterior/ firm/ FLOATING  BLOOD: no  DISCHARGE: thick, yellow    ROM: N/A  POOLING: negative  AMNISURE: not done  FLUID: none    LABS:  UA, UC, UT, WET PREP and GC/ CHLAMYDIA    DIAGNOSIS  ============  33w1d    NST: REACTIVE  CST: NOT DONE  Fetal Heart rate tracing: category one  (B37.3) Yeast infection of the vagina    (N30.00) Acute cystitis without hematuria  Bacterial Vaginosis    PLAN  ============  Home with PTL instructions per discharge instruction form and Prescriptions sent to pharmacy.   terconazole (TERAZOL 7) 0.4 % vaginal cream  nitroFURantoin macrocrystal-monohydrate         (MACROBID) 100 MG capsule  Reassess BV after treatment for UTI is complete.      Davie Hernandez CNM

## 2019-10-13 NOTE — TELEPHONE ENCOUNTER
Patient having severe, constant abdominal pain that is worsening.  Will call 911 per guideline protocol.  Also paged Davie Hernandez CNM to patient and notified [facility].    Soledad Hendrix RN  [facility]       Reason for Disposition    [1] SEVERE abdominal pain (e.g., excruciating) AND [2] constant AND [3] present > 1 hour    Protocols used: PREGNANCY - ABDOMINAL PAIN GREATER THAN 20 WEEKS EGA-A-AH

## 2019-10-14 LAB
BACTERIA SPEC CULT: NO GROWTH
C TRACH DNA SPEC QL NAA+PROBE: NEGATIVE
Lab: NORMAL
N GONORRHOEA DNA SPEC QL NAA+PROBE: NEGATIVE
SPECIMEN SOURCE: NORMAL

## 2019-10-18 ENCOUNTER — ANCILLARY PROCEDURE (OUTPATIENT)
Dept: ULTRASOUND IMAGING | Facility: CLINIC | Age: 34
End: 2019-10-18
Attending: ADVANCED PRACTICE MIDWIFE
Payer: COMMERCIAL

## 2019-10-18 ENCOUNTER — PRENATAL OFFICE VISIT (OUTPATIENT)
Dept: MIDWIFE SERVICES | Facility: CLINIC | Age: 34
End: 2019-10-18
Attending: ADVANCED PRACTICE MIDWIFE
Payer: COMMERCIAL

## 2019-10-18 VITALS
DIASTOLIC BLOOD PRESSURE: 68 MMHG | HEART RATE: 98 BPM | BODY MASS INDEX: 37.6 KG/M2 | SYSTOLIC BLOOD PRESSURE: 108 MMHG | OXYGEN SATURATION: 95 % | WEIGHT: 199 LBS

## 2019-10-18 DIAGNOSIS — Z34.83 ENCOUNTER FOR SUPERVISION OF OTHER NORMAL PREGNANCY, THIRD TRIMESTER: Primary | ICD-10-CM

## 2019-10-18 DIAGNOSIS — Z34.02 ENCOUNTER FOR SUPERVISION OF NORMAL FIRST PREGNANCY, SECOND TRIMESTER: ICD-10-CM

## 2019-10-18 PROCEDURE — 99207 ZZC PRENATAL VISIT: CPT | Performed by: ADVANCED PRACTICE MIDWIFE

## 2019-10-18 PROCEDURE — 76816 OB US FOLLOW-UP PER FETUS: CPT | Performed by: OBSTETRICS & GYNECOLOGY

## 2019-10-18 NOTE — PROGRESS NOTES
Feeling well.  Baby is active. Denies any leaking of fluid, vaginal bleeding, regular uterine contractions, or headaches or other concerns.  Reviewed US results:  Growth 63%.    Feels more stable, comfortable in the shelter where she is located.    Discussed GBS testing at next visit.    Reviewed to call [phone] for contractions, loss of fluid, vaginal bleeding, decreased fetal movement or any other questions or concerns.    RTC in 2 weeks.  Yue Robles, JONATHAN, APRN, CNM

## 2019-10-21 ENCOUNTER — PATIENT OUTREACH (OUTPATIENT)
Dept: CARE COORDINATION | Facility: CLINIC | Age: 34
End: 2019-10-21

## 2019-10-21 NOTE — PROGRESS NOTES
Clinic Care Coordination Contact  [facility]/Voicemail       Clinical Data: Care Coordinator Outreach  Outreach attempted x 1.  Left message on patient's voicemail with call back information and requested return call.  Plan:Care Coordinator will try to reach patient again in 1 week.         never

## 2019-10-29 ENCOUNTER — PATIENT OUTREACH (OUTPATIENT)
Dept: CARE COORDINATION | Facility: CLINIC | Age: 34
End: 2019-10-29

## 2019-10-29 NOTE — PROGRESS NOTES
Clinic Care Coordination Contact    SW called pt, however, now was not a good time to talk. Pt asked SW to call back tomorrow around 10 AM.

## 2019-10-30 ENCOUNTER — PATIENT OUTREACH (OUTPATIENT)
Dept: CARE COORDINATION | Facility: CLINIC | Age: 34
End: 2019-10-30

## 2019-10-30 NOTE — PROGRESS NOTES
Clinic Care Coordination Contact    Follow Up Progress Note      Assessment:  SW discussed current needs. Pt reported she has everything she needs for her baby at this time except for a car seat and pack and play. Pt is not eligible for [employer] for the car seat (do not take [employer]), SW discussed SW emailing pt [facility] free car seat distribution facility info, pt agreeable. Pt also explained she's been in contact with [employer] (pregnancy support agency) and they have said they will provide both the car seat and the pack and play so she's hoping this will work out, will follow up with resources SW has provided if it doesn't. SW also discussed [facility] as an option to get a pack and play and car seat, SW will email pt this info.    Pt updated SW that she's waiting on her baby being born to get into alternative housing at this time--once this occurs she will be eligible for more programs. Pt also reported she will be going to the [facility] today to apply for MFIP as she should be eligible now.    Goals       #1 Psychosocial (pt-stated)      Goal Statement: I need to find stable housing   Measure of Success: Pt will find stable housing   Supportive Steps to Achieve: Care coordination   Barriers: Expecting a baby in 2 months.   Strengths: Asking for help   Date to Achieve By: 11/30/19  Patient expressed understanding of goal: yes                 Intervention/Education provided during outreach: additional resources           Plan: Pt will follow up with [facility] for baby supply needs, pt will follow up with the [facility] for financial assistance, pt will follow up with her housing coordination worker(s) after her baby is born.    Care Coordinator will follow up in 1 month.

## 2019-10-31 ENCOUNTER — HOSPITAL ENCOUNTER (OUTPATIENT)
Facility: CLINIC | Age: 34
Discharge: HOME | End: 2019-10-31
Attending: ADVANCED PRACTICE MIDWIFE | Admitting: ADVANCED PRACTICE MIDWIFE
Payer: COMMERCIAL

## 2019-10-31 VITALS — TEMPERATURE: 97.8 F | DIASTOLIC BLOOD PRESSURE: 64 MMHG | SYSTOLIC BLOOD PRESSURE: 129 MMHG | RESPIRATION RATE: 18 BRPM

## 2019-10-31 DIAGNOSIS — G47.00 INSOMNIA, UNSPECIFIED TYPE: Primary | ICD-10-CM

## 2019-10-31 PROBLEM — Z36.89 ENCOUNTER FOR TRIAGE IN PREGNANT PATIENT: Status: ACTIVE | Noted: 2019-10-31

## 2019-10-31 PROCEDURE — G0463 HOSPITAL OUTPT CLINIC VISIT: HCPCS

## 2019-10-31 PROCEDURE — 59025 FETAL NON-STRESS TEST: CPT | Mod: 26 | Performed by: ADVANCED PRACTICE MIDWIFE

## 2019-10-31 PROCEDURE — 59025 FETAL NON-STRESS TEST: CPT

## 2019-10-31 PROCEDURE — 99213 OFFICE O/P EST LOW 20 MIN: CPT | Mod: 25 | Performed by: ADVANCED PRACTICE MIDWIFE

## 2019-10-31 PROCEDURE — G0463 HOSPITAL OUTPT CLINIC VISIT: HCPCS | Mod: 25

## 2019-10-31 PROCEDURE — 25000132 ZZH RX MED GY IP 250 OP 250 PS 637: Performed by: ADVANCED PRACTICE MIDWIFE

## 2019-10-31 RX ORDER — HYDROXYZINE HYDROCHLORIDE 50 MG/1
50 TABLET, FILM COATED ORAL EVERY 6 HOURS PRN
Status: DISCONTINUED | OUTPATIENT
Start: 2019-10-31 | End: 2019-11-01 | Stop reason: HOSPADM

## 2019-10-31 RX ORDER — HYDROXYZINE PAMOATE 100 MG
100 CAPSULE ORAL
Qty: 10 CAPSULE | Refills: 1 | Status: SHIPPED | OUTPATIENT
Start: 2019-10-31 | End: 2019-12-05

## 2019-10-31 RX ADMIN — HYDROXYZINE HYDROCHLORIDE 50 MG: 50 TABLET, FILM COATED ORAL at 21:29

## 2019-10-31 NOTE — PROVIDER NOTIFICATION
10/31/19 1851   Provider Notification   Provider Name/Title David SALDAÑA   Method of Notification Electronic Page   Request Evaluate in Person   Notification Reason Patient Arrived     Page Sent: Pt arrived, 35w5d, c/o ctx. . 3ctx at home, none since arrival. Denies LOF/bleeding. FHR category 1.     Additional Page Sent: Also, pt now crying c/o increased depression, anxiety and paranoia that people want to take her baby away    Provider was in another room for a delivery.

## 2019-11-01 ENCOUNTER — PRENATAL OFFICE VISIT (OUTPATIENT)
Dept: MIDWIFE SERVICES | Facility: CLINIC | Age: 34
End: 2019-11-01
Payer: COMMERCIAL

## 2019-11-01 VITALS
SYSTOLIC BLOOD PRESSURE: 114 MMHG | HEART RATE: 109 BPM | BODY MASS INDEX: 37.31 KG/M2 | WEIGHT: 197.6 LBS | HEIGHT: 61 IN | OXYGEN SATURATION: 95 % | DIASTOLIC BLOOD PRESSURE: 70 MMHG | TEMPERATURE: 97.1 F

## 2019-11-01 DIAGNOSIS — Z11.3 SCREENING EXAMINATION FOR VENEREAL DISEASE: ICD-10-CM

## 2019-11-01 DIAGNOSIS — Z34.02 ENCOUNTER FOR SUPERVISION OF NORMAL FIRST PREGNANCY, SECOND TRIMESTER: Primary | ICD-10-CM

## 2019-11-01 LAB — HGB BLD-MCNC: 12.2 G/DL (ref 11.7–15.7)

## 2019-11-01 PROCEDURE — 87591 N.GONORRHOEAE DNA AMP PROB: CPT | Performed by: ADVANCED PRACTICE MIDWIFE

## 2019-11-01 PROCEDURE — 99207 ZZC PRENATAL VISIT: CPT | Performed by: ADVANCED PRACTICE MIDWIFE

## 2019-11-01 PROCEDURE — 36415 COLL VENOUS BLD VENIPUNCTURE: CPT | Performed by: ADVANCED PRACTICE MIDWIFE

## 2019-11-01 PROCEDURE — 87653 STREP B DNA AMP PROBE: CPT | Performed by: ADVANCED PRACTICE MIDWIFE

## 2019-11-01 PROCEDURE — 00000218 ZZHCL STATISTIC OBHBG - HEMOGLOBIN: Performed by: ADVANCED PRACTICE MIDWIFE

## 2019-11-01 PROCEDURE — 87491 CHLMYD TRACH DNA AMP PROBE: CPT | Performed by: ADVANCED PRACTICE MIDWIFE

## 2019-11-01 PROCEDURE — 87186 SC STD MICRODIL/AGAR DIL: CPT | Performed by: ADVANCED PRACTICE MIDWIFE

## 2019-11-01 ASSESSMENT — PATIENT HEALTH QUESTIONNAIRE - PHQ9: SUM OF ALL RESPONSES TO PHQ QUESTIONS 1-9: 9

## 2019-11-01 ASSESSMENT — MIFFLIN-ST. JEOR: SCORE: 1533.69

## 2019-11-01 NOTE — H&P
"HOSPITAL TRIAGE NOTE  ===================    CHIEF COMPLAINT  ========================  Sveta Cuellar is a 34 year old patient presenting today at 35w5d for evaluation of uterine contractions and back pain.    Patient's last menstrual period was 2019.  Estimated Date of Delivery: 2019     HPI  ==================   Sveta [facility] the [facility] via ambulance after having \"three contractions in a row\" at home and some back pain. She was feeling very anxious, and wanted to make sure she is not in labor. She lives in a homeless shelter, and has a significant history of mental health issues. She also states that she is having a hard time sleeping, and it is causing her to feel more anxious and sometimes feels paranoid that someone will take her baby away. She says that she has had more insomnia lately, and knows that it makes it harder for her to cope when she is not sleeping well. She reports having a good relationship with a therapist, but hasn't seen her recently because she \"hasn't been feeling good\". She also lists several community resources that she goes to for mental health groups, including [facility], and she has been in communication with [facility]. She denies suicidal or homicidal ideation.   CONTRACTIONS: few  ABDOMINAL PAIN: none  FETAL MOVEMENT: active    VAGINAL BLEEDING: none  RUPTURE OF MEMBRANES: no  PELVIC PAIN: none  OTHER: back pain    REVIEW OF SYSTEMS  =====================  C: NEGATIVE for fever, chills  I: NEGATIVE for worrisome rashes, moles or lesions  E: NEGATIVE for vision changes or irritation  R: NEGATIVE for significant cough or SOB  CV: NEGATIVE for chest pain, palpitations or varicosities  GI: NEGATIVE for nausea, abdominal pain, heartburn, or change in bowel habits  : NEGATIVE for frequency, dysuria, or hematuria  M: NEGATIVE for significant arthralgias or myalgia  N: NEGATIVE for headache, weakness, dizziness or paresthesias  PSYCHIATRIC: depressed " mood, anxiety, insomnia and obsessive thoughts    HISTORIES  ==============  ALLERGIES:      Allergies   Allergen Reactions     Wellbutrin [Bupropion]      Nausea and dizziness     PAST MEDICAL HISTORY  PTSD  Depression/anxiety  UTI in pregnancy  Tendinitis in wrists    SOCIAL HISTORY:   Social History     Tobacco Use     Smoking status: Never Smoker     Smokeless tobacco: Never Used   Substance Use Topics     Alcohol use: Not Currently   Housing Instability    PARTNER: none present  FAMILY HISTORY  History reviewed. No pertinent family history.  OB HISTORY  OB History    Para Term  AB Living   1 0 0 0 0 0   SAB TAB Ectopic Multiple Live Births   0 0 0 0 0      # Outcome Date GA Lbr Eric/2nd Weight Sex Delivery Anes PTL Lv   1 Current                EXAM  ============  /64 (BP Location: Left arm)   Temp 97.8  F (36.6  C) (Oral)   Resp 18   LMP 2019   GENERAL APPEARANCE: healthy, alert and no distress  RESP: lungs clear to auscultation - no rales, rhonchi or wheezes  BREAST: normal without masses, tenderness or nipple discharge and no palpable axillary masses or adenopathy  CV: regular rates and rhythm, normal S1 S2, no S3 or S4 and no murmur,and no varicosities  ABDOMEN:  soft, nontender,non-tender between contractions no epigastric pain  NEURO: Denies headache, blurred vision  PSYCH: anxious and fast paced speech  LEGS: Reflexes normal bilaterally    CONTRACTIONS:  few  not palpable  FETAL HEART TONES:  125 with moderate variability, accelerations-yes, decels none.  NST: REACTIVE  CST: NOT DONE  EFW:6lbs    PELVIC EXAM:deferred  ROM: No  POOLING: not done  AMNISURE: not done    LABS:  none    DIAGNOSIS  ============  35w5d    Anxiety   Backpain in pregnancy  NST: REACTIVE  CST: NOT DONE  Fetal Heart rate tracing: category one    PLAN  ============  Home with PTL instructions per discharge instruction form, Medications given vistaril for sleep and Prescriptions given.  vistaril  Offered to take [facility] to [facility] for her symptoms of anxiety, increased depression and self-described paranoid thoughts. She declines. States that she feels safe going home. Plans to call her therapist and make an appointment. Feels that she has connections to lots of community supports, and does not need emergency care for her mental health at this time. Feels reassured that this is not labor, and that her baby is healthy.    Davie Hernandez CNM

## 2019-11-01 NOTE — PROGRESS NOTES
Asked to help place IUPC by CNM. SVE 4-5/50/-3, still quite thick, likely from gonzalez. IUPC placed on maternal right without issue.     Maribel Vasquez MD  Obstetrics and Gynecology, PGY-3  October 31, 2019 , 8:53 PM

## 2019-11-01 NOTE — PROVIDER NOTIFICATION
Data: [facility] at [facility].   Reason for maternal/fetal assessment per patient is Rule Out Labor  . Patient is a . Prenatal record reviewed.      OB History    Para Term  AB Living   1 0 0 0 0 0   SAB TAB Ectopic Multiple Live Births   0 0 0 0 0      # Outcome Date GA Lbr Eric/2nd Weight Sex Delivery Anes PTL Lv   1 Current               Medical History: History reviewed. No pertinent past medical history.. Gestational Age 35w5d. VSS. Cervix: not examined.  Fetal movement present. Patient denies vaginal discharge, pelvic pressure, UTI symptoms, GI problems, bloody show, vaginal bleeding, edema, headache, visual disturbances, epigastric or URQ pain, abdominal pain, rupture of membranes.   Action: Verbal consent for EFM. Triage assessment completed. EFM applied upon arrival. Uterine assessment showed occasional contractions. Fetal assessment: Presumed adequate fetal oxygenation documented (see flow record). Patient instructed to report change in fetal movement, vaginal leaking of fluid or bleeding, abdominal pain, or any concerns related to the pregnancy to her nurse/physician.   Response: David SALDAÑA informed of patients arrival and complaints of cramping and increased anxiety/depression/paranoia. Patient verbalized understanding of education and verbalized agreement with plan. Discharged ambulatory at 2130.

## 2019-11-01 NOTE — PROGRESS NOTES
Sveta came in last night for contractions that resolved spontaneously.  Given instructions of what to expect with a lot of ctx that start and stop frequently.  She is interested in her cervix check.  Noted very posterior and narrow pelvic arch/android.  Unable to reach cervix without causing pt discomfort.   Active fetus.  BSUS confirms cephalic.    ASSESSMENT: 35w6d   Homeless,  FOB not involved but BF supportive.  PLAN: RTC weekly, GBS/GC/Chlamydia/Hgb  JE

## 2019-11-01 NOTE — PROGRESS NOTES
Fetal Non-Stress Test Results    NST Ordered By: Davie Hernandez CNM  NST Medical Indication: R/o labor    NST Start & Stop Times  NST Start Time: 1830  NST Stop Time: 1850      NST Results  Fetus A   Baseline Rate: 125  Accelerations: Present  Decelerations: None  Interpretation: reactive    Davie Hernandez CNM

## 2019-11-02 LAB
GP B STREP DNA SPEC QL NAA+PROBE: POSITIVE
SPECIMEN SOURCE: ABNORMAL

## 2019-11-03 ENCOUNTER — HOSPITAL ENCOUNTER (OUTPATIENT)
Facility: CLINIC | Age: 34
Discharge: NURSING, REHAB OR LONG-TERM CARE | End: 2019-11-03
Attending: ADVANCED PRACTICE MIDWIFE | Admitting: ADVANCED PRACTICE MIDWIFE
Payer: COMMERCIAL

## 2019-11-03 VITALS — SYSTOLIC BLOOD PRESSURE: 120 MMHG | RESPIRATION RATE: 18 BRPM | DIASTOLIC BLOOD PRESSURE: 72 MMHG | TEMPERATURE: 97.7 F

## 2019-11-03 LAB
C TRACH DNA SPEC QL NAA+PROBE: NEGATIVE
N GONORRHOEA DNA SPEC QL NAA+PROBE: NEGATIVE
SPECIMEN SOURCE: NORMAL
SPECIMEN SOURCE: NORMAL

## 2019-11-03 PROCEDURE — 59025 FETAL NON-STRESS TEST: CPT | Mod: 26 | Performed by: ADVANCED PRACTICE MIDWIFE

## 2019-11-03 PROCEDURE — 59025 FETAL NON-STRESS TEST: CPT

## 2019-11-03 PROCEDURE — G0463 HOSPITAL OUTPT CLINIC VISIT: HCPCS | Mod: 25

## 2019-11-03 PROCEDURE — 99213 OFFICE O/P EST LOW 20 MIN: CPT | Mod: 25 | Performed by: ADVANCED PRACTICE MIDWIFE

## 2019-11-03 RX ORDER — ONDANSETRON 2 MG/ML
4 INJECTION INTRAMUSCULAR; INTRAVENOUS EVERY 6 HOURS PRN
Status: DISCONTINUED | OUTPATIENT
Start: 2019-11-03 | End: 2019-11-03 | Stop reason: HOSPADM

## 2019-11-03 NOTE — PROGRESS NOTES
[facility]: [facility]   Reason for maternal/fetal assessment per patient is Decreased Fetal Movement  Pt states that baby usually wakes her up throughout the night with movement, but she was not woken up for 6 hours.    Patient is a . Prenatal record reviewed.      OB History    Para Term  AB Living   1 0 0 0 0 0   SAB TAB Ectopic Multiple Live Births   0 0 0 0 0      # Outcome Date GA Lbr Eric/2nd Weight Sex Delivery Anes PTL Lv   1 Current            . Medical history:   Past Medical History:   Diagnosis Date     Depression    . Gestational Age 36w1d. VSS. Fetal movement present. Patient denies backache, vaginal discharge, pelvic pressure, UTI symptoms, GI problems, bloody show, vaginal bleeding, edema, headache, visual disturbances, epigastric or URQ pain, abdominal pain, rupture of membranes. Experiencing some cramping with occasional tightening.   Action: Verbal consent for EFM. Triage assessment completed. EFM applied for FHR and uterine monitoring. Uterine assessment WNL. Fetal assessment:  Presumed adequate fetal oxygenation documented (see flow record). NST performed for reported decreased fetal movement, reactive.   Response: KAREN Enriquez CNM informed of pt arrival. Plan per provider is discharge home. Education handout and verbal education given to pt about counting fetal kick counts and when to call provider. Patient verbalized agreement with plan. Discharge at 0653.

## 2019-11-03 NOTE — H&P
HOSPITAL TRIAGE NOTE  ===================    CHIEF COMPLAINT  ========================  Sveta Cuellar is a 34 year old patient presenting today at 36w1d for evaluation of decreased fetal movement.    Patient's last menstrual period was 2019.  Estimated Date of Delivery: 2019     HPI  ==================   Sveta reports she usually gets woken up at night from her baby but this morning she woke and realized she did not get woken up like usual and she became alarmed. Once she arrived she has felt many movements and feels reassured that he is moving normally. BSUS done showed cephalic position.   She has occasional contractions but nothing consistent. She has no water leaking or vaginal bleeding. She requested a cervical check. They were unable to reach her cervix in the clinic. Cervix L/C/A and head is not in the pelvis. She has nausea occasionally but able to eat and drink well. She otherwise has no other questions or concerns today.   CONTRACTIONS: mild  ABDOMINAL PAIN: none  FETAL MOVEMENT: decreased since overnight but currently active     VAGINAL BLEEDING: none  RUPTURE OF MEMBRANES: no  PELVIC PAIN: none  OTHER: none     REVIEW OF SYSTEMS  =====================  C: NEGATIVE for fever, chills  I: NEGATIVE for worrisome rashes, moles or lesions  E: NEGATIVE for vision changes or irritation  R: NEGATIVE for significant cough or SOB  CV: NEGATIVE for chest pain, palpitations or varicosities  GI: NEGATIVE for nausea, abdominal pain, heartburn, or change in bowel habits  : NEGATIVE for frequency, dysuria, or hematuria  M: NEGATIVE for significant arthralgias or myalgia  N: NEGATIVE for headache, weakness, dizziness or paresthesias  P: NEGATIVE for changes in mood or affect    HISTORIES  ==============  ALLERGIES:      Allergies   Allergen Reactions     Wellbutrin [Bupropion]      Nausea and dizziness     PAST MEDICAL HISTORY  Past Medical History:   Diagnosis Date     Depression      PARTNER:  boyfriend, no FOB. Not present today.     FAMILY HISTORY  History reviewed. No pertinent family history.     OB HISTORY  OB History    Para Term  AB Living   1 0 0 0 0 0   SAB TAB Ectopic Multiple Live Births   0 0 0 0 0      # Outcome Date GA Lbr Eric/2nd Weight Sex Delivery Anes PTL Lv   1 Current                EXAM  ============  /72   Temp 97.7  F (36.5  C) (Oral)   Resp 18   LMP 2019   GENERAL APPEARANCE: healthy, alert and no distress  RESP: lungs clear to auscultation - no rales, rhonchi or wheezes  BREAST: normal without masses, tenderness or nipple discharge and no palpable axillary masses or adenopathy  CV: regular rates and rhythm, normal S1 S2, no S3 or S4 and no murmur,and no varicosities  ABDOMEN:  soft, nontender,non-tender between contractions no epigastric pain  NEURO: Denies headache, blurred vision  PSYCH: mentation appears normal. and affect normal/bright  LEGS: Reflexes normal bilaterally    CONTRACTIONS: irregular   FETAL HEART TONES:  120 with moderate variability, accelerations-yes, decels none.  NST: REACTIVE  EFW: N/A    PELVIC EXAM: closed/ long/ Anterior/ average/ FLOATING  PRESENTATION: VERTEX  BLOOD: no  DISCHARGE: none    LABS:  No labs     DIAGNOSIS  ============  36w1d  Resolved decreased fetal movement   NST: REACTIVE  Fetal Heart rate tracing: category one    PLAN  ============  Home with PTL instructions per discharge instruction form  Continue to monitor fetal movement throughout the day   Follow up as scheduled for PNV     XIAO Perez CNM

## 2019-11-05 ENCOUNTER — PRENATAL OFFICE VISIT (OUTPATIENT)
Dept: MIDWIFE SERVICES | Facility: CLINIC | Age: 34
End: 2019-11-05
Payer: COMMERCIAL

## 2019-11-05 ENCOUNTER — OFFICE VISIT (OUTPATIENT)
Dept: FAMILY MEDICINE | Facility: CLINIC | Age: 34
End: 2019-11-05
Payer: COMMERCIAL

## 2019-11-05 VITALS
DIASTOLIC BLOOD PRESSURE: 77 MMHG | HEIGHT: 61 IN | WEIGHT: 197 LBS | BODY MASS INDEX: 37.19 KG/M2 | HEART RATE: 121 BPM | TEMPERATURE: 98 F | OXYGEN SATURATION: 96 % | SYSTOLIC BLOOD PRESSURE: 127 MMHG

## 2019-11-05 VITALS
OXYGEN SATURATION: 96 % | BODY MASS INDEX: 37.22 KG/M2 | DIASTOLIC BLOOD PRESSURE: 77 MMHG | SYSTOLIC BLOOD PRESSURE: 127 MMHG | HEART RATE: 121 BPM | WEIGHT: 197 LBS

## 2019-11-05 DIAGNOSIS — Z34.93 PRENATAL CARE IN THIRD TRIMESTER: ICD-10-CM

## 2019-11-05 DIAGNOSIS — Z34.03 ENCOUNTER FOR SUPERVISION OF NORMAL FIRST PREGNANCY, THIRD TRIMESTER: Primary | ICD-10-CM

## 2019-11-05 DIAGNOSIS — F33.9 EPISODE OF RECURRENT MAJOR DEPRESSIVE DISORDER, UNSPECIFIED DEPRESSION EPISODE SEVERITY (H): ICD-10-CM

## 2019-11-05 DIAGNOSIS — F43.10 PTSD (POST-TRAUMATIC STRESS DISORDER): Primary | ICD-10-CM

## 2019-11-05 LAB
BACTERIA SPEC CULT: ABNORMAL
SPECIMEN SOURCE: ABNORMAL

## 2019-11-05 PROCEDURE — 99214 OFFICE O/P EST MOD 30 MIN: CPT | Performed by: NURSE PRACTITIONER

## 2019-11-05 PROCEDURE — 99207 ZZC PRENATAL VISIT: CPT | Performed by: ADVANCED PRACTICE MIDWIFE

## 2019-11-05 RX ORDER — PRENATAL VIT/IRON FUM/FOLIC AC 27MG-0.8MG
1 TABLET ORAL DAILY
Qty: 90 TABLET | Refills: 3 | Status: SHIPPED | OUTPATIENT
Start: 2019-11-05 | End: 2023-10-21

## 2019-11-05 RX ORDER — METAPROTERENOL SULFATE 10 MG
500 TABLET ORAL DAILY
Qty: 90 CAPSULE | Refills: 1 | Status: SHIPPED | OUTPATIENT
Start: 2019-11-05 | End: 2019-12-05

## 2019-11-05 ASSESSMENT — MIFFLIN-ST. JEOR: SCORE: 1530.97

## 2019-11-05 NOTE — LETTER
November 5, 2019      Sveta Bancroft  [address] [address]        To Whom It May Concern:    Sveta Cuellar  was seen on 11/5/2019.  She is currently 36 weeks pregnant and deals with chronic mental health issues. It would negatively affect her health to move this week, and I recommend that she be allowed to stay in her current living situation until her baby is born and she is able to establish in  stable housing situation. Evicting her at this point in her pregnancy would put her and her baby at enormous risk for negative health complications.       Sincerely,        XIAO Mistry CNP

## 2019-11-05 NOTE — PROGRESS NOTES
36w3d  Feeling well. Nervous. Tired of living in a shelter with nuns. Nephew  recently which has been hard. Reports good fetal movement. Denies leaking of fluid, vaginal bleeding, regular uterine contractions, headache or other concerns.  RTC in 1 wk [facility]

## 2019-11-05 NOTE — PROGRESS NOTES
"Darin Cuellar is a 34 year old female who presents to clinic today for the following health issues:    HPI   Medication Followup Citalopram, hydroxyzine    Taking Medication as prescribed: yes    Side Effects:  None    Medication Helping Symptoms:  yes     Has been taking medications everyday. Has not noticed side effects. Has richardson \"really stressed\" lately about housing situation. She is [facility] in a [facility], and they have threatened to evict her in the next week. She doesn't have another place to go. She is planning on moving in with her boyfriend of two months in the next month. Has been seeing her therapist regularly, which is really helpful. Her family has lately been pressuring her to get , which she doesn't feel comfortable with. She had planned to have her father fly out to help her with the baby, but lately she has felt that he has been judgmental and not helpful, so she is planning to prepare for baby on her own.  No new mental health symptoms; no SI or HI.    Patient Active Problem List   Diagnosis     Prenatal care in third trimester     PTSD (post-traumatic stress disorder)     Depression     Housing lack     Left wrist pain     Tendinitis of left wrist     Labor and delivery indication for care or intervention     Urinary tract infection in mother during third trimester of pregnancy     Encounter for triage in pregnant patient     Labor and delivery, indication for care     Past Surgical History:   Procedure Laterality Date     GI SURGERY         Social History     Tobacco Use     Smoking status: Never Smoker     Smokeless tobacco: Never Used   Substance Use Topics     Alcohol use: Not Currently     History reviewed. No pertinent family history.        -------------------------------------  Reviewed and updated as needed this visit by Provider         Review of Systems   ROS COMP: Constitutional, HEENT, cardiovascular, pulmonary, GI, , " "musculoskeletal, neuro, skin, endocrine and psych systems are negative, except as otherwise noted.      Objective    /77   Pulse 121   Temp 98  F (36.7  C) (Oral)   Ht 1.549 m (5' 1\")   Wt 89.4 kg (197 lb)   LMP 02/23/2019   SpO2 96%   BMI 37.22 kg/m    Body mass index is 37.22 kg/m .  Physical Exam   GENERAL: healthy, alert and no distress  NECK: no adenopathy, no asymmetry, masses, or scars and thyroid normal to palpation  RESP: lungs clear to auscultation - no rales, rhonchi or wheezes  CV: regular rate and rhythm, normal S1 S2, no S3 or S4, no murmur, click or rub, no peripheral edema and peripheral pulses strong  MS: no gross musculoskeletal defects noted, no edema  PSYCH: mentation appears normal, affect normal/bright    Diagnostic Test Results:  Labs reviewed in Epic  No results found for this or any previous visit (from the past 24 hour(s)).        Assessment & Plan   Problem List Items Addressed This Visit     PTSD (post-traumatic stress disorder) - Primary    Prenatal care in third trimester    Relevant Medications    Prenatal Vit-Fe Fumarate-FA (PRENATAL MULTIVITAMIN W/IRON) 27-0.8 MG tablet    Omega-3 Fish Oil 500 MG capsule    Depression         -Continue current therapy  Letter written to housing staff recommending that she be allowed to stay until she delivers or finds another stable housing situation.  Follow up 1 month if possible    BMI:   Estimated body mass index is 37.22 kg/m  as calculated from the following:    Height as of this encounter: 1.549 m (5' 1\").    Weight as of this encounter: 89.4 kg (197 lb).           See Patient Instructions  No follow-ups on file.    XIAO Mistry [facility]      "

## 2019-11-07 ENCOUNTER — TELEPHONE (OUTPATIENT)
Dept: MIDWIFE SERVICES | Facility: CLINIC | Age: 34
End: 2019-11-07

## 2019-11-07 NOTE — TELEPHONE ENCOUNTER
Pt is calling to discuss migraine headache.  She recently took tylenol, either 500mg or 325mg, she's not sure.  Encouraged her to take additional tablets (either 1 500mg or 2 325mg) and some caffeine and extra fluids.  Advised ED if she is not getting any relief.  Pt verbalized understanding.  Brianna Jiang RN

## 2019-11-14 ENCOUNTER — PRENATAL OFFICE VISIT (OUTPATIENT)
Dept: MIDWIFE SERVICES | Facility: CLINIC | Age: 34
End: 2019-11-14
Payer: COMMERCIAL

## 2019-11-14 VITALS
BODY MASS INDEX: 37.98 KG/M2 | DIASTOLIC BLOOD PRESSURE: 75 MMHG | HEART RATE: 81 BPM | WEIGHT: 201 LBS | SYSTOLIC BLOOD PRESSURE: 139 MMHG

## 2019-11-14 DIAGNOSIS — Z34.93 PRENATAL CARE IN THIRD TRIMESTER: Primary | ICD-10-CM

## 2019-11-14 PROCEDURE — 99207 ZZC PRENATAL VISIT: CPT | Performed by: ADVANCED PRACTICE MIDWIFE

## 2019-11-14 NOTE — PROGRESS NOTES
Feeling well.  Baby is active. Denies any leaking of fluid, vaginal bleeding, regular uterine contractions, or headaches or other concerns.  She moved into a duplex with her boyfriend, so is no longer in the shelter.  Asking about a car seat.  I referred her to the .  She has talked to her before.  She said that she was talking when she had her BP taken.  Retaken manually 102/50.    Reviewed to call [phone] for contractions, loss of fluid, vaginal bleeding, decreased fetal movement or any other questions or concerns.    RTC in 1 weeks.  Yue Robles, JONATHAN, APRN, CNM

## 2019-11-16 ENCOUNTER — HOSPITAL ENCOUNTER (OUTPATIENT)
Facility: CLINIC | Age: 34
Discharge: HOME | End: 2019-11-17
Attending: ADVANCED PRACTICE MIDWIFE | Admitting: ADVANCED PRACTICE MIDWIFE
Payer: COMMERCIAL

## 2019-11-16 PROCEDURE — G0463 HOSPITAL OUTPT CLINIC VISIT: HCPCS

## 2019-11-16 PROCEDURE — 84112 EVAL AMNIOTIC FLUID PROTEIN: CPT | Performed by: ADVANCED PRACTICE MIDWIFE

## 2019-11-16 RX ORDER — ONDANSETRON 4 MG/1
4 TABLET, ORALLY DISINTEGRATING ORAL EVERY 6 HOURS PRN
Status: DISCONTINUED | OUTPATIENT
Start: 2019-11-16 | End: 2019-11-17 | Stop reason: HOSPADM

## 2019-11-16 RX ORDER — ACETAMINOPHEN 325 MG/1
650 TABLET ORAL EVERY 4 HOURS PRN
Status: DISCONTINUED | OUTPATIENT
Start: 2019-11-16 | End: 2019-11-17 | Stop reason: HOSPADM

## 2019-11-17 ENCOUNTER — HOSPITAL ENCOUNTER (OUTPATIENT)
Facility: CLINIC | Age: 34
End: 2019-11-17
Admitting: ADVANCED PRACTICE MIDWIFE
Payer: COMMERCIAL

## 2019-11-17 VITALS — DIASTOLIC BLOOD PRESSURE: 71 MMHG | RESPIRATION RATE: 18 BRPM | SYSTOLIC BLOOD PRESSURE: 111 MMHG | TEMPERATURE: 97.7 F

## 2019-11-17 LAB
ANION GAP SERPL CALCULATED.3IONS-SCNC: 7 MMOL/L (ref 3–14)
BUN SERPL-MCNC: 6 MG/DL (ref 7–30)
CALCIUM SERPL-MCNC: 8.3 MG/DL (ref 8.5–10.1)
CHLORIDE SERPL-SCNC: 108 MMOL/L (ref 94–109)
CO2 SERPL-SCNC: 23 MMOL/L (ref 20–32)
CREAT SERPL-MCNC: 0.41 MG/DL (ref 0.52–1.04)
ERYTHROCYTE [DISTWIDTH] IN BLOOD BY AUTOMATED COUNT: 15.8 % (ref 10–15)
GFR SERPL CREATININE-BSD FRML MDRD: >90 ML/MIN/{1.73_M2}
GLUCOSE SERPL-MCNC: 83 MG/DL (ref 70–99)
HCT VFR BLD AUTO: 37.8 % (ref 35–47)
HGB BLD-MCNC: 11.8 G/DL (ref 11.7–15.7)
MCH RBC QN AUTO: 27.1 PG (ref 26.5–33)
MCHC RBC AUTO-ENTMCNC: 31.2 G/DL (ref 31.5–36.5)
MCV RBC AUTO: 87 FL (ref 78–100)
PLATELET # BLD AUTO: 197 10E9/L (ref 150–450)
POTASSIUM SERPL-SCNC: 3.4 MMOL/L (ref 3.4–5.3)
RBC # BLD AUTO: 4.36 10E12/L (ref 3.8–5.2)
RUPTURE OF FETAL MEMBRANES BY ROM PLUS: NEGATIVE
SODIUM SERPL-SCNC: 138 MMOL/L (ref 133–144)
WBC # BLD AUTO: 8 10E9/L (ref 4–11)

## 2019-11-17 PROCEDURE — 25000128 H RX IP 250 OP 636: Performed by: ADVANCED PRACTICE MIDWIFE

## 2019-11-17 PROCEDURE — 59025 FETAL NON-STRESS TEST: CPT | Mod: 26 | Performed by: ADVANCED PRACTICE MIDWIFE

## 2019-11-17 PROCEDURE — 25000132 ZZH RX MED GY IP 250 OP 250 PS 637: Performed by: ADVANCED PRACTICE MIDWIFE

## 2019-11-17 PROCEDURE — 85027 COMPLETE CBC AUTOMATED: CPT | Performed by: ADVANCED PRACTICE MIDWIFE

## 2019-11-17 PROCEDURE — 99213 OFFICE O/P EST LOW 20 MIN: CPT | Mod: 25 | Performed by: ADVANCED PRACTICE MIDWIFE

## 2019-11-17 PROCEDURE — 80048 BASIC METABOLIC PNL TOTAL CA: CPT | Performed by: ADVANCED PRACTICE MIDWIFE

## 2019-11-17 PROCEDURE — 36415 COLL VENOUS BLD VENIPUNCTURE: CPT | Performed by: ADVANCED PRACTICE MIDWIFE

## 2019-11-17 PROCEDURE — G0463 HOSPITAL OUTPT CLINIC VISIT: HCPCS

## 2019-11-17 RX ORDER — ONDANSETRON 4 MG/1
4 TABLET, ORALLY DISINTEGRATING ORAL EVERY 6 HOURS PRN
Qty: 10 TABLET | Refills: 0 | Status: SHIPPED | OUTPATIENT
Start: 2019-11-17 | End: 2023-06-16

## 2019-11-17 RX ORDER — HYDROXYZINE HYDROCHLORIDE 50 MG/1
50 TABLET, FILM COATED ORAL EVERY 6 HOURS PRN
Status: DISCONTINUED | OUTPATIENT
Start: 2019-11-17 | End: 2019-11-17 | Stop reason: HOSPADM

## 2019-11-17 RX ADMIN — HYDROXYZINE HYDROCHLORIDE 50 MG: 50 TABLET, FILM COATED ORAL at 01:12

## 2019-11-17 RX ADMIN — ONDANSETRON 4 MG: 4 TABLET, ORALLY DISINTEGRATING ORAL at 00:10

## 2019-11-17 RX ADMIN — ACETAMINOPHEN 650 MG: 325 TABLET, FILM COATED ORAL at 00:09

## 2019-11-17 NOTE — PROGRESS NOTES
Data: [facility] at 2304.   Reason for maternal/fetal assessment per patient is nausea, leaking of fluid, and Heartburn  . Patient is a . Prenatal record reviewed.      OB History    Para Term  AB Living   1 0 0 0 0 0   SAB TAB Ectopic Multiple Live Births   0 0 0 0 0      # Outcome Date GA Lbr Eric/2nd Weight Sex Delivery Anes PTL Lv   1 Current               Medical History:   Past Medical History:   Diagnosis Date     Depression    . Gestational Age 38w1d. VSS. Cervix: posterior, closed, long and firm.  Fetal movement present. Patient denies cramping, backache, UTI symptoms, GI problems, bloody show, vaginal bleeding, edema, headache, visual disturbances, epigastric or URQ pain, abdominal pain, rupture of membranes.   Action: Verbal consent for EFM. Triage assessment completed. Irritability with occasional contractions noted, oral fluids encouraged. Fetal assessment: appropriate for gestational age. Presumed adequate fetal oxygenation documented (see flow record). Patient education pamphlets given on fetal movement counts and false labor. Patient instructed to report change in fetal movement, vaginal leaking of fluid or bleeding, abdominal pain, or any concerns related to the pregnancy to her nurse/physician. Pt given acetaminophen, zofran, and visteril for pain, nausea, and sleep.   Response: CHAPINCITO Hernandez CNM informed of arrival. Plan per provider is lab work and discharge home. Patient verbalized understanding of education and verbalized agreement with plan. Discharged ambulatory at 0115.

## 2019-11-17 NOTE — H&P
"HOSPITAL TRIAGE NOTE  ===================    CHIEF COMPLAINT  ========================  Sveta Cuellar is a 34 year old patient presenting today at 38w0d for evaluation of abdominal pain and leaking fluid.    Patient's last menstrual period was 2019.  Estimated Date of Delivery: 2019     HPI  ==================   [facility] for evaluation of frequent abdominal pain, feeling like her baby is kicking her in the chest and making it hard to breathe. States she hasn't felt good all day, frequent bowel movements, pelvic pressure. Has noticed that everytime she stands up, \"something leaks\". She has also felt nauseous and has had some food aversions today. She recently moved from the shelter in with her boyfriend. Has been unpacking her belongings, but \"trying not to do too much\". Feels that is is difficult to move right now due to the heaviness of the baby in her abdomen.  CONTRACTIONS: cramping  ABDOMINAL PAIN: none and cramping  FETAL MOVEMENT: active    VAGINAL BLEEDING: none  RUPTURE OF MEMBRANES: no  PELVIC PAIN: pressure    REVIEW OF SYSTEMS  =====================  C: NEGATIVE for fever, chills  I: NEGATIVE for worrisome rashes, moles or lesions  E: NEGATIVE for vision changes or irritation  R: NEGATIVE for significant cough or SOB  CV: NEGATIVE for chest pain, palpitations or varicosities  GI: POSITIVE for abdominal pain generalized, nausea and frequent BM's  : NEGATIVE for frequency, dysuria, or hematuria  MUSCULOSKELETAL:POSITIVE  for back pain   N: NEGATIVE for headache, weakness, dizziness or paresthesias  PSYCHIATRIC: POSITIVE forHx anxiety, Hx depression and stress    HISTORIES  ==============  ALLERGIES:      Allergies   Allergen Reactions     Wellbutrin [Bupropion]      Nausea and dizziness     PAST MEDICAL HISTORY  Past Medical History:   Diagnosis Date     Depression      PARTNER: not present    FAMILY HISTORY  No family history on file.  OB " HISTORY  OB History    Para Term  AB Living   1 0 0 0 0 0   SAB TAB Ectopic Multiple Live Births   0 0 0 0 0      # Outcome Date GA Lbr Eric/2nd Weight Sex Delivery Anes PTL Lv   1 Current                EXAM  ============  LMP 2019   GENERAL APPEARANCE: mild distress, crying and over weight  RESP: lungs clear to auscultation - no rales, rhonchi or wheezes  CV: regular rates and rhythm, normal S1 S2, no S3 or S4 and no murmur,and no varicosities  ABDOMEN: soft, tender in LUQ  NEURO: Denies headache, blurred vision  PSYCH: mentation appears normal. Thoughts are scattered and speech is fast.  LEGS: Reflexes normal bilaterally    CONTRACTIONS:  irregular  mild  FETAL HEART TONES:  130 with moderate variability, accelerations-yes, decels none.  NST: REACTIVE  EFW:7lbs    PELVIC EXAM: 1/ long/ Posterior/ firm/ high  PRESENTATION: VERTEX  BLOOD: no  DISCHARGE: none    ROM: No  POOLING: not done  ROM+: negative  FLUID: none    LABS:  CBC & BMP  Results for MICHELLE AGUSTIN (MRN [id number]) as of 2019 01:18   Ref. Range 2019 00:43   Sodium Latest Ref Range: 133 - 144 mmol/L 138   Potassium Latest Ref Range: 3.4 - 5.3 mmol/L 3.4   Chloride Latest Ref Range: 94 - 109 mmol/L 108   Carbon Dioxide Latest Ref Range: 20 - 32 mmol/L 23   Urea Nitrogen Latest Ref Range: 7 - 30 mg/dL 6 (L)   Creatinine Latest Ref Range: 0.52 - 1.04 mg/dL 0.41 (L)   GFR Estimate Latest Ref Range: >60 mL/min/1.73_m2 >90   GFR Estimate If Black Latest Ref Range: >60 mL/min/1.73_m2 >90   Calcium Latest Ref Range: 8.5 - 10.1 mg/dL 8.3 (L)   Anion Gap Latest Ref Range: 3 - 14 mmol/L 7   Glucose Latest Ref Range: 70 - 99 mg/dL 83   WBC Latest Ref Range: 4.0 - 11.0 10e9/L 8.0   Hemoglobin Latest Ref Range: 11.7 - 15.7 g/dL 11.8   Hematocrit Latest Ref Range: 35.0 - 47.0 % 37.8   Platelet Count Latest Ref Range: 150 - 450 10e9/L 197   RBC Count Latest Ref Range: 3.8 - 5.2 10e12/L 4.36   MCV Latest Ref Range: 78 - 100 fl 87    MCH Latest Ref Range: 26.5 - 33.0 pg 27.1   MCHC Latest Ref Range: 31.5 - 36.5 g/dL 31.2 (L)   RDW Latest Ref Range: 10.0 - 15.0 % 15.8 (H)       DIAGNOSIS  ============  38w0d    Nausea in pregnancy  NST: REACTIVE  Fetal Heart rate tracing: category one    PLAN  ============  Home with labor instructions per discharge instruction form and Medications given-atarax, zofran, tylenol  Prescription for zofran ODT, as she had good resolution of her symptoms.  Follow up in clinic this week as scheduled.      Davie Hernandez CNM

## 2019-12-02 ENCOUNTER — PATIENT OUTREACH (OUTPATIENT)
Dept: CARE COORDINATION | Facility: CLINIC | Age: 34
End: 2019-12-02

## 2019-12-02 NOTE — PROGRESS NOTES
Clinic Care Coordination Contact  [facility]/Voicemail       Clinical Data: Care Coordinator Outreach  Outreach attempted x 1.  Left message on patient's voicemail with call back information and requested return call.  Plan:. Care Coordinator will try to reach patient again in 1 week.

## 2019-12-03 ENCOUNTER — TELEPHONE (OUTPATIENT)
Dept: MIDWIFE SERVICES | Facility: CLINIC | Age: 34
End: 2019-12-03

## 2019-12-03 NOTE — TELEPHONE ENCOUNTER
"Subjective:      Patient ID: Jarrell Carreno Jr. is a 61 y.o. male.    Chief Complaint: Foot Injury (right foot something fell on little toe )    follow-up from emergency department treatment of fracture right foot that occurred approximately 3 weeks ago. Relates injury occurred while at work.  Describes something falling on his foot. X-ray taken 06/20/2019 revealed a nondisplaced fracture of the right foot 5th metatarsal neck.  He was placed in a posterior splint and told to follow up.  He has been weight-bearing to the foot utilizing the splint AMA.  Relates only mild pain today.  Currently on disability.    Vitals:    07/19/19 1047   BP: (!) 178/87   Pulse: 79   Weight: 95.3 kg (210 lb)   Height: 5' 11" (1.803 m)   PainSc: 0-No pain      No past medical history on file.    No past surgical history on file.    Family History   Problem Relation Age of Onset    Hypertension Mother     Cancer Maternal Grandmother        Social History     Socioeconomic History    Marital status:      Spouse name: Not on file    Number of children: Not on file    Years of education: Not on file    Highest education level: Not on file   Occupational History    Not on file   Social Needs    Financial resource strain: Not on file    Food insecurity:     Worry: Not on file     Inability: Not on file    Transportation needs:     Medical: Not on file     Non-medical: Not on file   Tobacco Use    Smoking status: Current Every Day Smoker     Packs/day: 1.00     Years: 4.00     Pack years: 4.00     Types: Cigarettes    Smokeless tobacco: Never Used   Substance and Sexual Activity    Alcohol use: Never     Frequency: Never    Drug use: Not on file    Sexual activity: Yes     Partners: Female   Lifestyle    Physical activity:     Days per week: Not on file     Minutes per session: Not on file    Stress: Not on file   Relationships    Social connections:     Talks on phone: Not on file     Gets together: Not on file     " Called and left message to call clinic back.   See message below- needs PNV.   Lilian Jennings,       Attends Hoah[employer]ism service: Not on file     Active member of club or organization: Not on file     Attends meetings of clubs or organizations: Not on file     Relationship status: Not on file   Other Topics Concern    Not on file   Social History Narrative    Not on file       Current Outpatient Medications   Medication Sig Dispense Refill    ibuprofen (ADVIL,MOTRIN) 600 MG tablet Take 1 tablet (600 mg total) by mouth every 6 (six) hours as needed for Pain. 20 tablet 0    hydrocodone-acetaminophen 5-325mg (NORCO) 5-325 mg per tablet Take 1 tablet by mouth every 6 (six) hours as needed for Pain. 31 tablet 0    omeprazole (PRILOSEC) 10 MG capsule Take 10 mg by mouth once daily.      ranitidine (ZANTAC) 150 MG capsule Take 150 mg by mouth 2 (two) times daily.      silver sulfADIAZINE 1% (SILVADENE) 1 % cream Apply topically 2 (two) times daily. 30 g 0    silver sulfADIAZINE 1% (SILVADENE) 1 % cream Apply topically 2 (two) times daily. 1 Bottle 3     No current facility-administered medications for this visit.        Review of patient's allergies indicates:  No Known Allergies      Review of Systems   Constitution: Negative for chills, fever and malaise/fatigue.   HENT: Negative for congestion.    Cardiovascular: Negative for chest pain, claudication and leg swelling.   Respiratory: Negative for cough and shortness of breath.    Skin: Negative for color change, itching and poor wound healing.   Musculoskeletal: Negative for back pain, joint pain, muscle cramps and muscle weakness.   Gastrointestinal: Negative for nausea and vomiting.   Neurological: Negative for numbness, paresthesias and weakness.   Psychiatric/Behavioral: Negative for altered mental status.           Objective:      Physical Exam   Constitutional: He is oriented to person, place, and time. He appears well-developed and well-nourished. No distress.   Cardiovascular: Intact distal pulses.   Pulses:       Dorsalis pedis pulses are 2+ on the right  side, and 2+ on the left side.        Posterior tibial pulses are 2+ on the right side, and 2+ on the left side.   Musculoskeletal:   Localized pain on palpation overlying the dorsal aspect 5th metatarsal at the anatomical neck region.  Mild localized edema.  No warmth or discoloration.  Slight pain with 5 MTP range of motion right foot.   Neurological: He is alert and oriented to person, place, and time. He has normal strength. No sensory deficit.   Skin: Skin is warm, dry and intact. Capillary refill takes less than 2 seconds. No ecchymosis, no lesion, no petechiae and no rash noted. He is not diaphoretic. No cyanosis or erythema. No pallor. Nails show no clubbing.             Assessment:       Encounter Diagnosis   Name Primary?    Closed nondisplaced fracture of fifth metatarsal bone of right foot, initial encounter Yes         Plan:       Jarrell was seen today for foot injury.    Diagnoses and all orders for this visit:    Closed nondisplaced fracture of fifth metatarsal bone of right foot, initial encounter  -     X-Ray Foot Complete Right; Future  -     X-Ray Foot Complete Right; Future      I counseled the patient on his conditions, their implications and medical management.    Reviewed previous x-ray taken on 06/20/2019 and agree with findings of nondisplaced transverse fracture of the 5th metatarsal neck right foot.    Reviewed right foot x-ray taken today noting maintained alignment of the previous fracture.  Unable to view there is a subtle medial shift of the capital fragment that is approximately 1 mm and acceptable.  The fracture alignment is not purely transverse there is an oblique and transverse component to it.    Dispensed short orthopedic boot for the right foot.  Patient instructed to wear whenever applying weight to the foot.    Return to clinic within 4 weeks or as needed per discussion.    Patient relates at this time that he is also taking over-the-counter vitamin-D supplementation.  .

## 2019-12-03 NOTE — TELEPHONE ENCOUNTER
----- Message from Davie Hernandez CNM sent at 12/3/2019  8:33 AM CST -----  Can you reach out to Sveta and get her in for a prenatal visit? She is now overdue, and has not been seen since 11/14/19. She no-showed for me on 11/22, which was unlike her.   Thanks  Davie Hernandez CNM

## 2019-12-05 ENCOUNTER — PRENATAL OFFICE VISIT (OUTPATIENT)
Dept: MIDWIFE SERVICES | Facility: CLINIC | Age: 34
End: 2019-12-05
Payer: COMMERCIAL

## 2019-12-05 ENCOUNTER — OFFICE VISIT (OUTPATIENT)
Dept: FAMILY MEDICINE | Facility: CLINIC | Age: 34
End: 2019-12-05
Payer: COMMERCIAL

## 2019-12-05 VITALS
DIASTOLIC BLOOD PRESSURE: 87 MMHG | BODY MASS INDEX: 38.34 KG/M2 | HEART RATE: 100 BPM | SYSTOLIC BLOOD PRESSURE: 137 MMHG | WEIGHT: 202.9 LBS

## 2019-12-05 VITALS
RESPIRATION RATE: 16 BRPM | DIASTOLIC BLOOD PRESSURE: 66 MMHG | TEMPERATURE: 97.7 F | WEIGHT: 202 LBS | OXYGEN SATURATION: 96 % | HEART RATE: 103 BPM | BODY MASS INDEX: 38.17 KG/M2 | SYSTOLIC BLOOD PRESSURE: 114 MMHG

## 2019-12-05 DIAGNOSIS — F43.10 PTSD (POST-TRAUMATIC STRESS DISORDER): Primary | ICD-10-CM

## 2019-12-05 DIAGNOSIS — Z34.93 PRENATAL CARE IN THIRD TRIMESTER: Primary | ICD-10-CM

## 2019-12-05 DIAGNOSIS — F48.8 DISASSOCIATION: ICD-10-CM

## 2019-12-05 DIAGNOSIS — Z34.93 PRENATAL CARE IN THIRD TRIMESTER: ICD-10-CM

## 2019-12-05 DIAGNOSIS — G47.00 INSOMNIA, UNSPECIFIED TYPE: ICD-10-CM

## 2019-12-05 PROCEDURE — 99207 ZZC PRENATAL VISIT: CPT | Performed by: ADVANCED PRACTICE MIDWIFE

## 2019-12-05 PROCEDURE — 99214 OFFICE O/P EST MOD 30 MIN: CPT | Performed by: NURSE PRACTITIONER

## 2019-12-05 PROCEDURE — 59426 ANTEPARTUM CARE ONLY: CPT | Performed by: ADVANCED PRACTICE MIDWIFE

## 2019-12-05 RX ORDER — HYDROXYZINE HYDROCHLORIDE 25 MG/1
25-50 TABLET, FILM COATED ORAL EVERY 6 HOURS PRN
Qty: 90 TABLET | Refills: 1 | Status: ON HOLD | OUTPATIENT
Start: 2019-12-05 | End: 2023-11-29

## 2019-12-05 RX ORDER — METAPROTERENOL SULFATE 10 MG
500 TABLET ORAL DAILY
Qty: 90 CAPSULE | Refills: 1 | Status: SHIPPED | OUTPATIENT
Start: 2019-12-05 | End: 2023-10-21

## 2019-12-05 NOTE — PROGRESS NOTES
40w5d  Sveta is here by herself today. Feeling very uncomfortable and hard to move around, ready to be done. Came to clinic with a bag of belongings and a carseat, wanting to be induced today. Discussed that we cannot induce before 41w without a medical indication, so that would be Sat. Scheduled with L&D for 10am Sat morning. SVE today closed/50/-3/posterior/medium. Discussed induction of labor and likely to need cervical ripening, expect that it will be a multiple day process. She states baby is very active, denies bleeding, leaking of fluid, headaches, RUQ pain, new edema. Says that things are going ok at her boyfriend's house. Has been having a lot of mood swings, but he is understanding. Knows how to contact CNM team, plan for induction of labor on Sat.   Davie Hernandez CNM

## 2019-12-05 NOTE — Clinical Note
Darin SepulvedaSveta is scheduled for PD induction of labor on Saturday at 10am, see my note from today for details. She has a very complex social and mental health history, so will definitely need a SW consult inpatient. Davie Hernandez CNM

## 2019-12-05 NOTE — PROGRESS NOTES
Subjective     Sveta Cuellar is a 34 year old female who presents to clinic today for the following health issues:    HPI   Medication Followup of Hydroxyzine    Taking Medication as prescribed: yes    Side Effects:  None    Medication Helping Symptoms:  yes   Has had more trouble with mood lately. November 30th was her nephew's birthday, and he passed away during the summer. She and her boyfriend are newly living together which has been stressful. She feels that the relationship is good, but feels like she lashes out at him and disassociates. She feels like her boyfriend doesn't understand what her mental health conditions are.   Has not taken citalopram since she started on hydroxyzine; thinks citalopram made her more tired and groggy. Takes 50 mg at night to sleep; when she takes 25 mg during the day it helps her to feel more relaxed. She ran out recently.   Otherwise she has been sleeping and eating well.   -------------------------------------    Patient Active Problem List   Diagnosis     Prenatal care in third trimester     PTSD (post-traumatic stress disorder)     Depression     Housing lack     Left wrist pain     Tendinitis of left wrist     Labor and delivery indication for care or intervention     Urinary tract infection in mother during third trimester of pregnancy     Encounter for triage in pregnant patient     Labor and delivery, indication for care     Disassociation (H)     Past Surgical History:   Procedure Laterality Date     GI SURGERY         Social History     Tobacco Use     Smoking status: Never Smoker     Smokeless tobacco: Never Used   Substance Use Topics     Alcohol use: Not Currently     History reviewed. No pertinent family history.        -------------------------------------  Reviewed and updated as needed this visit by Provider         Review of Systems   ROS COMP: Constitutional, HEENT, cardiovascular, pulmonary, gi and gu systems are negative, except as otherwise noted.     "  Objective    /66   Pulse 103   Temp 97.7  F (36.5  C) (Oral)   Resp 16   Wt 91.6 kg (202 lb)   LMP 02/23/2019   SpO2 96%   BMI 38.17 kg/m    Body mass index is 38.17 kg/m .  Physical Exam   GENERAL: healthy, alert and no distress  NECK: no adenopathy, no asymmetry, masses, or scars and thyroid normal to palpation  RESP: lungs clear to auscultation - no rales, rhonchi or wheezes  CV: regular rate and rhythm, normal S1 S2, no S3 or S4, no murmur, click or rub, no peripheral edema and peripheral pulses strong  MS: no gross musculoskeletal defects noted, no edema  PSYCH: mentation appears normal, affect normal/bright, tearful, judgement and insight intact and appearance well groomed    Diagnostic Test Results:  Labs reviewed in Epic  No results found for this or any previous visit (from the past 24 hour(s)).        Assessment & Plan   Problem List Items Addressed This Visit     PTSD (post-traumatic stress disorder) - Primary    Relevant Medications    hydrOXYzine (ATARAX) 25 MG tablet    Prenatal care in third trimester    Relevant Medications    Omega-3 Fish Oil 500 MG capsule      Other Visit Diagnoses     Insomnia, unspecified type             Discussed strategies to explain mental health conditions to boyfriend. Came up with a plan of activties to try to manage anxiety. Discussed things to ask for with boyfriend. She is planning to set up with mother-baby psych day treatment program once baby is born. She plans on being induced on Saturday if baby is not born sooner.     BMI:   Estimated body mass index is 38.17 kg/m  as calculated from the following:    Height as of 11/5/19: 1.549 m (5' 1\").    Weight as of this encounter: 91.6 kg (202 lb).        Discussed case with midwife Davie Hernandez. Sveta has a history of disassociation when she is stressed, and will need lots of education during labor to explain what is happening. She is triggered by sensation of restraint, as she has previously been " restrained during psych hospitalization.. Her boyfriend (Joe) is supportive, but is not knowledgable about her chronic mental health issues, and may need education and support if she is triggered during labor. A  Psych consult during hospitalization may be helpful, or a volunteer  if available. SW should be involved as well following delivery. She is very motivated to care for the baby, and has a stable living situation, but will benefit from parenting support and parenting classes if available.     See Patient Instructions  Return in about 2 weeks (around 2019).    XIAO Mistry [facility]

## 2019-12-05 NOTE — PATIENT INSTRUCTIONS
"  -With bad panic and PTSD, you need to be able to let out the stress that has built up in your life.   1) Tight physical contact can help -hugs for 45 seconds.   If he would say affirmation while hugging this would feel better and not trigger panic for you (\"you're safe. I love you.  Everything is going to be okay.\")  2) Crying can help you let out stress. Its helping to let out emotions and help you move on. If boyfriend is worried by your crying, tell him it is a thing you need to do to lower your stress.  3) writing/ creative activity. Try this when you are calmer. Doing this with boyfriend or his child is a great way to bond.  4) physical activity -walking, running, punching or kicking (like kickboxing).     -At night if you feel like you are in a dream, it is \"disassociation\" you may feel like you are out of your body, watching yourself, say things you don't mean.  Dissassociiation is a part of PTSD. The reason you have this is because of the trauma you went through. It will get better and you will learn how to deal with it through medication and therapy. It is not a personality problem (or multiple personalities) it is a response to stress.     -\"I feel scared and guilty when I am angry. . I feel like people will say I'm crazy when I'm angry. It's better for me to be alone when I'm angry because then I can calm down on my own. When I am calm I'm ready to talk.\"  -it would help if he could leave you alone for a bit when you are angry, so that you can calm down and talk to him when you are feeling calmer. Maybe writing down a list so that you can think through what you are actually angry about. Please know that I love you but I am trying to love myself for the first time.     If I am not calm, I need time by myself to calm down before I can talk to you. When I'm ready I can come back and talk. It doesn't help me to be told to calm down or relax, this makes me more anxious and angry, because it feels like you " are trying to control me, even though I know you are trying to help.      My behaviour doesn't have to do with you as a person, it has to do with the trauma I've been through. I am learning how to deal with being in love. Its very hard for me to be vulnerable because I have never been safe. It will take time and practice to learn how to be vulnerable and feel safe.

## 2019-12-06 ENCOUNTER — HOSPITAL ENCOUNTER (OUTPATIENT)
Facility: CLINIC | Age: 34
End: 2019-12-06
Attending: ADVANCED PRACTICE MIDWIFE | Admitting: ADVANCED PRACTICE MIDWIFE
Payer: COMMERCIAL

## 2019-12-07 ENCOUNTER — TELEPHONE (OUTPATIENT)
Dept: MIDWIFE SERVICES | Facility: CLINIC | Age: 34
End: 2019-12-07

## 2019-12-07 NOTE — TELEPHONE ENCOUNTER
T.C.   To patient asking about coming to for induction patient was expected 1 hour ago. Pt to call [facility] at [phone].  Coco Kincaid CNM

## 2019-12-07 NOTE — TELEPHONE ENCOUNTER
RUBEN to patient her phone goes directly to voice mail.   Called to ask patient to call [facility] as far as plan of care for her.  We were expecting her for induction of labor today at 1000am ( 5 hours ago)   Left message to call and tell us her plan of if she plans to come in.  A message was left with her boyfriend Joe about 2 hours ago, neither one of them have returned our phone calls.  Coco Kincaid CNM

## 2019-12-07 NOTE — TELEPHONE ENCOUNTER
T.C to patient but phone direct to voice mail, did not leave another message  Called Boyfriend Joe's phon and left message to have Sveta call [facility] as we are expecting her for induction today.  Coco Kincaid CNM

## 2019-12-08 ENCOUNTER — TELEPHONE (OUTPATIENT)
Dept: MIDWIFE SERVICES | Facility: CLINIC | Age: 34
End: 2019-12-08

## 2019-12-08 NOTE — TELEPHONE ENCOUNTER
LISSET   Called patient regarding her missed appointment for induction of labor. Left voice mail to please call back about her plan.     Lynne Casillas, TORINM, APRN CNM

## 2019-12-09 ENCOUNTER — PATIENT OUTREACH (OUTPATIENT)
Dept: CARE COORDINATION | Facility: CLINIC | Age: 34
End: 2019-12-09

## 2019-12-09 NOTE — PROGRESS NOTES
Clinic Care Coordination Contact  [facility]/Voicemail       Clinical Data: Care Coordinator Outreach  Outreach attempted x 2.  Left message on patient's voicemail with call back information and requested return call.  Plan: Care Coordinator sent unable to contact letter with care coordinator contact information via mail. Care Coordinator will try to reach patient again in 1 month.

## 2019-12-09 NOTE — LETTER
[place] CARE COORDINATION    December 9, 2019    Sveta Es[place]ido  [address]  [place] [address]      Dear Sveta,    [facility] Care Coordination has been attempting to reach you since our last contact. We would like to continue to work with you and provide any additional support you may need on achieving your health care related goals. We would appreciate if you would give Aretha BECKHAM a call at [phone] to let us know if you would like to continue working together.    All of us at the [facility] are invested in your health and are here to assist you in meeting your goals.     Sincerely,    GREGORY ChapaSW

## 2020-01-09 ENCOUNTER — PATIENT OUTREACH (OUTPATIENT)
Dept: CARE COORDINATION | Facility: CLINIC | Age: 35
End: 2020-01-09

## 2020-01-09 NOTE — PROGRESS NOTES
Clinic Care Coordination Contact  [facility]/Voicemail       Clinical Data: Care Coordinator Outreach  Outreach attempted x 3.  Left message on patient's voicemail with call back information and requested return call.  Plan: Care Coordinator sent unable to contact letter on 12/9/19 with care coordinator contact information via mail. Care Coordinator will follow up with Lead CC to determine if additional outreach will occur.

## 2020-01-09 NOTE — LETTER
[place] CARE COORDINATION  January 14, 2020    Sveta Bath  [address]  [place] [address]      Dear Sveta,    I have been unsuccessful in reaching you since our last contact. At this time I will make no further attempts to reach you, however this does not change your ability to continue receiving care from your providers at [facility]. If you are needing additional support from a care coordinator in the future please contact me at [phone].    All of us at [facility] are invested in your health and are here to assist you in meeting your goals.    Sincerely,    Aretha Rowley, PATRICKW

## 2020-01-14 NOTE — PROGRESS NOTES
Clinic Care Coordination Contact  Care Team Conversations    SW has called pt 3x and left voicemail's. SW will close pt out of SW CC and SW will do no further outreaches.     PLAN: Disenrollment lette was sent.     Aretha Rowley [facility]  Clinic Care Coordinator   [facility]   [phone]

## 2020-01-30 PROBLEM — M25.532 LEFT WRIST PAIN: Status: RESOLVED | Noted: 2019-10-11 | Resolved: 2020-01-30

## 2020-01-30 PROBLEM — M77.8 TENDINITIS OF LEFT WRIST: Status: RESOLVED | Noted: 2019-10-11 | Resolved: 2020-01-30

## 2020-03-11 ENCOUNTER — HEALTH MAINTENANCE LETTER (OUTPATIENT)
Age: 35
End: 2020-03-11

## 2021-01-03 ENCOUNTER — HEALTH MAINTENANCE LETTER (OUTPATIENT)
Age: 36
End: 2021-01-03

## 2021-04-25 ENCOUNTER — HEALTH MAINTENANCE LETTER (OUTPATIENT)
Age: 36
End: 2021-04-25

## 2021-10-10 ENCOUNTER — HEALTH MAINTENANCE LETTER (OUTPATIENT)
Age: 36
End: 2021-10-10

## 2022-05-21 ENCOUNTER — HEALTH MAINTENANCE LETTER (OUTPATIENT)
Age: 37
End: 2022-05-21

## 2022-09-18 ENCOUNTER — HEALTH MAINTENANCE LETTER (OUTPATIENT)
Age: 37
End: 2022-09-18

## 2023-06-04 ENCOUNTER — HEALTH MAINTENANCE LETTER (OUTPATIENT)
Age: 38
End: 2023-06-04

## 2023-06-16 ENCOUNTER — HOSPITAL ENCOUNTER (EMERGENCY)
Facility: CLINIC | Age: 38
Discharge: HOME | End: 2023-06-16
Attending: FAMILY MEDICINE | Admitting: FAMILY MEDICINE
Payer: COMMERCIAL

## 2023-06-16 ENCOUNTER — APPOINTMENT (OUTPATIENT)
Dept: CT IMAGING | Facility: CLINIC | Age: 38
End: 2023-06-16
Attending: FAMILY MEDICINE
Payer: COMMERCIAL

## 2023-06-16 VITALS
HEART RATE: 90 BPM | RESPIRATION RATE: 18 BRPM | WEIGHT: 236 LBS | HEIGHT: 60 IN | BODY MASS INDEX: 46.33 KG/M2 | OXYGEN SATURATION: 97 % | DIASTOLIC BLOOD PRESSURE: 84 MMHG | SYSTOLIC BLOOD PRESSURE: 145 MMHG | TEMPERATURE: 98.7 F

## 2023-06-16 DIAGNOSIS — N30.00 ACUTE CYSTITIS WITHOUT HEMATURIA: ICD-10-CM

## 2023-06-16 DIAGNOSIS — R19.7 DIARRHEA OF PRESUMED INFECTIOUS ORIGIN: ICD-10-CM

## 2023-06-16 LAB
ALBUMIN SERPL BCG-MCNC: 4.2 G/DL (ref 3.5–5.2)
ALBUMIN UR-MCNC: NEGATIVE MG/DL
ALP SERPL-CCNC: 63 U/L (ref 35–104)
ALT SERPL W P-5'-P-CCNC: 28 U/L (ref 0–50)
ANION GAP SERPL CALCULATED.3IONS-SCNC: 12 MMOL/L (ref 7–15)
APPEARANCE UR: ABNORMAL
AST SERPL W P-5'-P-CCNC: 20 U/L (ref 0–45)
BASOPHILS # BLD AUTO: 0 10E3/UL (ref 0–0.2)
BASOPHILS NFR BLD AUTO: 0 %
BILIRUB SERPL-MCNC: 0.3 MG/DL
BILIRUB UR QL STRIP: NEGATIVE
BUN SERPL-MCNC: 6.5 MG/DL (ref 6–20)
CALCIUM SERPL-MCNC: 9.6 MG/DL (ref 8.6–10)
CHLORIDE SERPL-SCNC: 105 MMOL/L (ref 98–107)
COLOR UR AUTO: YELLOW
CREAT SERPL-MCNC: 0.68 MG/DL (ref 0.51–0.95)
DEPRECATED HCO3 PLAS-SCNC: 23 MMOL/L (ref 22–29)
EOSINOPHIL # BLD AUTO: 0.3 10E3/UL (ref 0–0.7)
EOSINOPHIL NFR BLD AUTO: 3 %
ERYTHROCYTE [DISTWIDTH] IN BLOOD BY AUTOMATED COUNT: 15.3 % (ref 10–15)
GFR SERPL CREATININE-BSD FRML MDRD: >90 ML/MIN/1.73M2
GLUCOSE SERPL-MCNC: 92 MG/DL (ref 70–99)
GLUCOSE UR STRIP-MCNC: NEGATIVE MG/DL
HCG SERPL QL: NEGATIVE
HCT VFR BLD AUTO: 36.3 % (ref 35–47)
HGB BLD-MCNC: 11.5 G/DL (ref 11.7–15.7)
HGB UR QL STRIP: NEGATIVE
IMM GRANULOCYTES # BLD: 0 10E3/UL
IMM GRANULOCYTES NFR BLD: 0 %
KETONES UR STRIP-MCNC: NEGATIVE MG/DL
LEUKOCYTE ESTERASE UR QL STRIP: ABNORMAL
LIPASE SERPL-CCNC: 23 U/L (ref 13–60)
LYMPHOCYTES # BLD AUTO: 1.8 10E3/UL (ref 0.8–5.3)
LYMPHOCYTES NFR BLD AUTO: 19 %
MCH RBC QN AUTO: 25.5 PG (ref 26.5–33)
MCHC RBC AUTO-ENTMCNC: 31.7 G/DL (ref 31.5–36.5)
MCV RBC AUTO: 81 FL (ref 78–100)
MONOCYTES # BLD AUTO: 0.4 10E3/UL (ref 0–1.3)
MONOCYTES NFR BLD AUTO: 4 %
MUCOUS THREADS #/AREA URNS LPF: PRESENT /LPF
NEUTROPHILS # BLD AUTO: 7.3 10E3/UL (ref 1.6–8.3)
NEUTROPHILS NFR BLD AUTO: 74 %
NITRATE UR QL: NEGATIVE
NRBC # BLD AUTO: 0 10E3/UL
NRBC BLD AUTO-RTO: 0 /100
PH UR STRIP: 6 [PH] (ref 5–7)
PLATELET # BLD AUTO: 228 10E3/UL (ref 150–450)
POTASSIUM SERPL-SCNC: 4 MMOL/L (ref 3.4–5.3)
PROT SERPL-MCNC: 6.8 G/DL (ref 6.4–8.3)
RBC # BLD AUTO: 4.51 10E6/UL (ref 3.8–5.2)
RBC URINE: 4 /HPF
SODIUM SERPL-SCNC: 140 MMOL/L (ref 136–145)
SP GR UR STRIP: 1.01 (ref 1–1.03)
SQUAMOUS EPITHELIAL: 2 /HPF
UROBILINOGEN UR STRIP-MCNC: NORMAL MG/DL
WBC # BLD AUTO: 9.8 10E3/UL (ref 4–11)
WBC URINE: 20 /HPF

## 2023-06-16 PROCEDURE — 96374 THER/PROPH/DIAG INJ IV PUSH: CPT | Mod: 59 | Performed by: FAMILY MEDICINE

## 2023-06-16 PROCEDURE — 85025 COMPLETE CBC W/AUTO DIFF WBC: CPT | Performed by: FAMILY MEDICINE

## 2023-06-16 PROCEDURE — 258N000003 HC RX IP 258 OP 636: Performed by: FAMILY MEDICINE

## 2023-06-16 PROCEDURE — 74177 CT ABD & PELVIS W/CONTRAST: CPT

## 2023-06-16 PROCEDURE — 84703 CHORIONIC GONADOTROPIN ASSAY: CPT | Performed by: FAMILY MEDICINE

## 2023-06-16 PROCEDURE — 83690 ASSAY OF LIPASE: CPT | Performed by: FAMILY MEDICINE

## 2023-06-16 PROCEDURE — 99285 EMERGENCY DEPT VISIT HI MDM: CPT | Mod: 25 | Performed by: FAMILY MEDICINE

## 2023-06-16 PROCEDURE — 36415 COLL VENOUS BLD VENIPUNCTURE: CPT | Performed by: FAMILY MEDICINE

## 2023-06-16 PROCEDURE — 250N000011 HC RX IP 250 OP 636: Performed by: FAMILY MEDICINE

## 2023-06-16 PROCEDURE — 250N000009 HC RX 250: Performed by: FAMILY MEDICINE

## 2023-06-16 PROCEDURE — 81003 URINALYSIS AUTO W/O SCOPE: CPT | Performed by: FAMILY MEDICINE

## 2023-06-16 PROCEDURE — 87086 URINE CULTURE/COLONY COUNT: CPT | Performed by: FAMILY MEDICINE

## 2023-06-16 PROCEDURE — 99284 EMERGENCY DEPT VISIT MOD MDM: CPT | Performed by: FAMILY MEDICINE

## 2023-06-16 PROCEDURE — 80053 COMPREHEN METABOLIC PANEL: CPT | Performed by: FAMILY MEDICINE

## 2023-06-16 PROCEDURE — 96361 HYDRATE IV INFUSION ADD-ON: CPT | Performed by: FAMILY MEDICINE

## 2023-06-16 RX ORDER — KETOROLAC TROMETHAMINE 15 MG/ML
15 INJECTION, SOLUTION INTRAMUSCULAR; INTRAVENOUS ONCE
Status: COMPLETED | OUTPATIENT
Start: 2023-06-16 | End: 2023-06-16

## 2023-06-16 RX ORDER — ONDANSETRON 4 MG/1
4-8 TABLET, ORALLY DISINTEGRATING ORAL EVERY 6 HOURS PRN
Qty: 10 TABLET | Refills: 0 | Status: SHIPPED | OUTPATIENT
Start: 2023-06-16 | End: 2023-10-21

## 2023-06-16 RX ORDER — CIPROFLOXACIN 500 MG/1
500 TABLET, FILM COATED ORAL 2 TIMES DAILY
Qty: 10 TABLET | Refills: 0 | Status: SHIPPED | OUTPATIENT
Start: 2023-06-16 | End: 2023-10-21

## 2023-06-16 RX ORDER — ONDANSETRON 2 MG/ML
4 INJECTION INTRAMUSCULAR; INTRAVENOUS
Status: DISCONTINUED | OUTPATIENT
Start: 2023-06-16 | End: 2023-06-16 | Stop reason: HOSPADM

## 2023-06-16 RX ORDER — IOPAMIDOL 755 MG/ML
100 INJECTION, SOLUTION INTRAVASCULAR ONCE
Status: COMPLETED | OUTPATIENT
Start: 2023-06-16 | End: 2023-06-16

## 2023-06-16 RX ADMIN — SODIUM CHLORIDE, POTASSIUM CHLORIDE, SODIUM LACTATE AND CALCIUM CHLORIDE 1000 ML: 600; 310; 30; 20 INJECTION, SOLUTION INTRAVENOUS at 19:58

## 2023-06-16 RX ADMIN — KETOROLAC TROMETHAMINE 15 MG: 15 INJECTION, SOLUTION INTRAMUSCULAR; INTRAVENOUS at 19:42

## 2023-06-16 RX ADMIN — SODIUM CHLORIDE 69 ML: 9 INJECTION, SOLUTION INTRAVENOUS at 19:46

## 2023-06-16 RX ADMIN — IOPAMIDOL 100 ML: 755 INJECTION, SOLUTION INTRAVENOUS at 19:46

## 2023-06-16 ASSESSMENT — ACTIVITIES OF DAILY LIVING (ADL)
ADLS_ACUITY_SCORE: 35
ADLS_ACUITY_SCORE: 35

## 2023-06-16 NOTE — ED NOTES
Pt presents to the via EMS for abd pain that started 2 weeks ago. Pt c/o intermittent sharp shooting abd pain in both sides with diarrhea for two weeks, fatigue, urinary frequency and urgency starting today, and migraine yesterday.   Plan: Awaiting MD evaluation and orders.

## 2023-06-16 NOTE — ED TRIAGE NOTES
Headaches and nausea for 2 weeks, has had a poor appetite and abd pain     Triage Assessment     Row Name 06/16/23 [facility]       Triage Assessment (Adult)    Airway WDL WDL       Respiratory WDL    Respiratory WDL WDL       Skin Circulation/Temperature WDL    Skin Circulation/Temperature WDL WDL       Cardiac WDL    Cardiac WDL WDL       Peripheral/Neurovascular WDL    Peripheral Neurovascular WDL WDL       Cognitive/Neuro/Behavioral WDL    Cognitive/Neuro/Behavioral WDL WDL

## 2023-06-17 NOTE — ED PROVIDER NOTES
HPI   Patient is a 37-year-old female presenting with abdominal pain and diarrhea.  Her past medical history is reviewed.  Medications are reviewed.  She has not had recent travel.  She denies recent antibiotics.  She denies recent medication changes.    The patient has had diarrhea 2-5 times daily over the previous 2 weeks.  She has occasionally seen blood.  She denies melena.  She denies mucus.  She denies fever though yesterday she has had chills.  She does have abdominal discomfort in the mid abdomen, the pelvis, and along both flanks.  This waxes and wanes without obvious cause, perhaps related to diarrhea.  She has had a decreased appetite and occasional nausea.  No vomiting.  No other known sick contacts.  No vaginal discharge or irritation of the ordinary.  No vaginal bleeding.  She has a Depo shot and does not think she is pregnant.  She does have increased urinary frequency since yesterday though she denies dysuria and urgency.  No hematuria.        Allergies:  Allergies   Allergen Reactions     Wellbutrin [Bupropion]      Nausea and dizziness     Problem List:    Patient Active Problem List    Diagnosis Date Noted     Disassociation 12/05/2019     Priority: Medium     Associated with PTSD symptoms       Encounter for triage in pregnant patient 10/31/2019     Priority: Medium     Urinary tract infection in mother during third trimester of pregnancy 10/13/2019     Priority: Medium     Housing lack 09/24/2019     Priority: Medium     Prenatal care in third trimester 09/20/2019     Priority: Medium     CESAR from [facility] at 29 weeks   All labs including GCT done at [facility]    9/24/19 patient living in crisis shelter, needs to be out this week.   Care coordination referral.   Growth U/S 10/18/19: Cephalic fetus.  EFW 5 pounds 4 ounces, 2369 g, 63rd growth percentile.  RAMESH normal, 12.3.       PTSD (post-traumatic stress disorder) 09/20/2019     Priority: Medium     Self reported.  Has services        Depression  09/20/2019     Priority: Medium     Self reported, has services   9/24/19 pt states on meds?  Possible bipolar diagnosis?        Past Medical History:    Past Medical History:   Diagnosis Date     Depression      Past Surgical History:    Past Surgical History:   Procedure Laterality Date     GI SURGERY       Family History:    No family history on file.  Social History:  Marital Status:  Single [1]  Social History     Tobacco Use     Smoking status: Never     Smokeless tobacco: Never   Substance Use Topics     Alcohol use: Not Currently     Drug use: Never      Medications:    ciprofloxacin (CIPRO) 500 MG tablet  ondansetron (ZOFRAN ODT) 4 MG ODT tab  acetaminophen (TYLENOL) 500 MG tablet  Cetirizine HCl (ZYRTEC ALLERGY PO)  docusate sodium (COLACE) 100 MG capsule  hydrOXYzine (ATARAX) 25 MG tablet  Omega-3 Fish Oil 500 MG capsule  Prenatal Vit-Fe Fumarate-FA (PRENATAL MULTIVITAMIN W/IRON) 27-0.8 MG tablet  vitamin B6 (PYRIDOXINE) 25 MG tablet  vitamin D3 (CHOLECALCIFEROL) 2000 units (50 mcg) tablet      Review of Systems   All other systems reviewed and are negative.      PE   BP: (!) 142/93  Pulse: 84  Temp: 99.9  F (37.7  C)  Resp: 16  Height: 152.4 cm (5')  Weight: 107 kg (236 lb)  SpO2: 97 %  Physical Exam  Vitals reviewed.   Constitutional:       General: She is not in acute distress.     Appearance: She is well-developed. She is obese.   HENT:      Head: Normocephalic and atraumatic.      Right Ear: External ear normal.      Left Ear: External ear normal.      Nose: Nose normal.      Mouth/Throat:      Mouth: Mucous membranes are moist.      Pharynx: Oropharynx is clear.   Eyes:      Extraocular Movements: Extraocular movements intact.      Conjunctiva/sclera: Conjunctivae normal.      Pupils: Pupils are equal, round, and reactive to light.   Cardiovascular:      Rate and Rhythm: Normal rate and regular rhythm.   Pulmonary:      Effort: Pulmonary effort is normal.   Abdominal:      Comments: Tender along  both lateral abdominal regions, tender in the midline pelvis.  Obese and difficult examination as a result.  No appreciable mass or organomegaly.   Musculoskeletal:         General: Normal range of motion.      Cervical back: Normal range of motion.   Skin:     General: Skin is warm and dry.   Neurological:      Mental Status: She is alert and oriented to person, place, and time.   Psychiatric:         Behavior: Behavior normal.         ED COURSE and [facility]   1905.  Patient has symptoms and signs as described above.  CBC, comprehensive panel, lipase, urine analysis, hCG serum ordered.  Stool culture and C. difficile if possible.  CT with IV contrast looking at the abdomen and pelvis.  Fluid bolus, Zofran, Toradol.    2046.  CT scan unremarkable for acute pathology requiring hospitalization or surgery.  Urine analysis abnormal, urine culture pending.  I will treat with ciprofloxacin with concern for acute cystitis and her diarrhea being bacterial related.  Gastroenterology referral order placed at the time of discharge, fill and follow-up if not improving.  Return here for worsening as discussed.  Patient agrees with the plan.    Electronic medical chart reviewed, including medical problems, medications, medical allergies, social history.  Recent hospitalizations and surgical procedures reviewed.  Recent clinic visits and consultations reviewed.  Recent labs and test results reviewed.  Nursing notes reviewed.    The patient, their parent if applicable, and/or their medical decision maker(s) and I have reviewed all of the available historical information, applicable PMH, physical exam findings, and objective diagnostic data gathered during this ED visit.  We then discussed all work-up options and then together agreed upon the course taken during this visit.  The ultimate disposition and plan was a cooperative decision made between myself and the patient, their parent if applicable, and/or their legal decision maker(s).   The risks and benefits of all decisions made during this visit were discussed to the best of my abilities given the circumstances, and all parties are understanding of the pertinent ramifications of these decisions.      LABS  Labs Ordered and Resulted from Time of ED Arrival to Time of ED Departure   ROUTINE UA WITH MICROSCOPIC REFLEX TO CULTURE - Abnormal       Result Value    Color Urine Yellow      Appearance Urine Slightly Cloudy (*)     Glucose Urine Negative      Bilirubin Urine Negative      Ketones Urine Negative      Specific Gravity Urine 1.014      Blood Urine Negative      pH Urine 6.0      Protein Albumin Urine Negative      Urobilinogen Urine Normal      Nitrite Urine Negative      Leukocyte Esterase Urine Moderate (*)     Mucus Urine Present (*)     RBC Urine 4 (*)     WBC Urine 20 (*)     Squamous Epithelials Urine 2 (*)    CBC WITH PLATELETS AND DIFFERENTIAL - Abnormal    WBC Count 9.8      RBC Count 4.51      Hemoglobin 11.5 (*)     Hematocrit 36.3      MCV 81      MCH 25.5 (*)     MCHC 31.7      RDW 15.3 (*)     Platelet Count 228      % Neutrophils 74      % Lymphocytes 19      % Monocytes 4      % Eosinophils 3      % Basophils 0      % Immature Granulocytes 0      NRBCs per 100 WBC 0      Absolute Neutrophils 7.3      Absolute Lymphocytes 1.8      Absolute Monocytes 0.4      Absolute Eosinophils 0.3      Absolute Basophils 0.0      Absolute Immature Granulocytes 0.0      Absolute NRBCs 0.0     COMPREHENSIVE METABOLIC PANEL - Normal    Sodium 140      Potassium 4.0      Chloride 105      Carbon Dioxide (CO2) 23      Anion Gap 12      Urea Nitrogen 6.5      Creatinine 0.68      Calcium 9.6      Glucose 92      Alkaline Phosphatase 63      AST 20      ALT 28      Protein Total 6.8      Albumin 4.2      Bilirubin Total 0.3      GFR Estimate >90     HCG QUALITATIVE PREGNANCY - Normal    hCG Serum Qualitative Negative     LIPASE - Normal    Lipase 23     ENTERIC BACTERIA AND VIRUS PANEL BY [facility] STOOL    C. DIFFICILE TOXIN B PCR WITH REFLEX TO C. DIFFICILE ANTIGEN AND TOXINS A/B EIA   URINE CULTURE       IMAGING  Images reviewed by me.  Radiology report also reviewed.  CT Abdomen Pelvis w Contrast   Final Result   IMPRESSION:    1.  No acute findings or inflammatory changes in the abdomen or pelvis.          Procedures    Medications   lactated ringers BOLUS 1,000 mL (1,000 mLs Intravenous $New Bag 6/16/23 1958)   ondansetron (ZOFRAN) injection 4 mg (has no administration in time range)   ketorolac (TORADOL) injection 15 mg (15 mg Intravenous $Given 6/16/23 1942)   iopamidol (ISOVUE-370) solution 100 mL (100 mLs Intravenous $Given 6/16/23 1946)   sodium chloride 0.9 % bag 500mL for CT scan flush use (69 mLs Intravenous $Given 6/16/23 1946)         IMPRESSION       ICD-10-CM    1. Diarrhea of presumed infectious origin  R19.7 Enteric Bacteria and Virus Panel by [facility] Stool     C. difficile Toxin B PCR with reflex to C. difficile Antigen and Toxins A/B EIA     Adult GI  Referral - Consult Only      2. Acute cystitis without hematuria  N30.00                Medication List      Started    ciprofloxacin 500 MG tablet  Commonly known as: CIPRO  500 mg, Oral, 2 TIMES DAILY        Modified    ondansetron 4 MG ODT tab  Commonly known as: ZOFRAN ODT  4-8 mg, Oral, EVERY 6 HOURS PRN  What changed:     how much to take    reasons to take this                        Leo Rich MD  06/16/23 2048

## 2023-06-17 NOTE — DISCHARGE INSTRUCTIONS
RETURN TO THE EMERGENCY ROOM FOR THE FOLLOWING:    Severely worsened pain, dehydration, or at anytime for any concern.    FOLLOW UP:    A referral order was placed for gastroenterology.  Expect a phone call to help with scheduling.    Stool studies (culture and C. Diff).    TREATMENT RECOMMENDATIONS:    Zofran for nausea, as needed.    Ciprofloxacin 500 mg twice daily x 5 days.    NURSE ADVICE LINE:  [phone] or [phone]

## 2023-06-18 LAB — BACTERIA UR CULT: NORMAL

## 2023-09-15 ENCOUNTER — HOSPITAL ENCOUNTER (EMERGENCY)
Facility: CLINIC | Age: 38
Discharge: HOME | End: 2023-09-15
Attending: FAMILY MEDICINE | Admitting: FAMILY MEDICINE
Payer: COMMERCIAL

## 2023-09-15 ENCOUNTER — APPOINTMENT (OUTPATIENT)
Dept: CT IMAGING | Facility: CLINIC | Age: 38
End: 2023-09-15
Attending: FAMILY MEDICINE
Payer: COMMERCIAL

## 2023-09-15 VITALS
TEMPERATURE: 98.8 F | RESPIRATION RATE: 16 BRPM | WEIGHT: 235 LBS | DIASTOLIC BLOOD PRESSURE: 83 MMHG | OXYGEN SATURATION: 97 % | SYSTOLIC BLOOD PRESSURE: 126 MMHG | HEIGHT: 60 IN | BODY MASS INDEX: 46.13 KG/M2 | HEART RATE: 88 BPM

## 2023-09-15 DIAGNOSIS — R22.1 NECK FULLNESS: ICD-10-CM

## 2023-09-15 PROCEDURE — 99283 EMERGENCY DEPT VISIT LOW MDM: CPT | Performed by: FAMILY MEDICINE

## 2023-09-15 PROCEDURE — 250N000011 HC RX IP 250 OP 636: Performed by: FAMILY MEDICINE

## 2023-09-15 PROCEDURE — 71275 CT ANGIOGRAPHY CHEST: CPT

## 2023-09-15 PROCEDURE — 99285 EMERGENCY DEPT VISIT HI MDM: CPT | Mod: 25 | Performed by: FAMILY MEDICINE

## 2023-09-15 PROCEDURE — 250N000009 HC RX 250: Performed by: FAMILY MEDICINE

## 2023-09-15 RX ORDER — IOPAMIDOL 755 MG/ML
91 INJECTION, SOLUTION INTRAVASCULAR ONCE
Status: COMPLETED | OUTPATIENT
Start: 2023-09-15 | End: 2023-09-15

## 2023-09-15 RX ADMIN — SODIUM CHLORIDE 100 ML: 9 INJECTION, SOLUTION INTRAVENOUS at 16:16

## 2023-09-15 RX ADMIN — IOPAMIDOL 91 ML: 755 INJECTION, SOLUTION INTRAVENOUS at 16:16

## 2023-09-15 ASSESSMENT — ACTIVITIES OF DAILY LIVING (ADL)
ADLS_ACUITY_SCORE: 35
ADLS_ACUITY_SCORE: 33

## 2023-09-15 NOTE — ED TRIAGE NOTES
Bi-lateral shoulder pain and swelling. States it's been going on for a month, now pain with any movement of the neck     Triage Assessment       Row Name 09/15/23 9421       Triage Assessment (Adult)    Airway WDL WDL       Cardiac WDL    Cardiac WDL WDL       Cognitive/Neuro/Behavioral WDL    Cognitive/Neuro/Behavioral WDL WDL

## 2023-09-15 NOTE — ED PROVIDER NOTES
"  History     Chief Complaint   Patient presents with    Shoulder Pain     Bi-lateral shoulder pain and swelling. States it's been going on for a month, now pain with any movement of the neck     HPI  Sveta Cuellar is a 37 year old female who is in concerned that she has swelling in the area of the supraclavicular fossa bilaterally.  She is quite overweight and there is considerable adipose tissue in this area.  This makes it difficult to assess if there is true swelling or edema.  She believes it is swollen.  She is not having any trouble with her swallowing or any neck pain.  This area is uncomfortable and hurts with movement.  She has not sustained any recent weight loss.  She takes medications for depression as well as metformin for diabetes and a pill for insomnia.  When asked about shortness of breath she says that she is always short of breath.  This is not a new symptom.    Past medical history through care everywhere:  \"Allergies  Reconcile with Patient's ChartAllergies  Active Allergy Reactions Criticality Noted Date Comments   Bupropion Diarrhea, Nausea And Vomiting, Nausea Only, Dizziness, Tremors High 04/02/2019       Medications  Reconcile with Patient's ChartMedications  Medication Sig Dispensed Refills Start Date End Date Status   blood-glucose meter   Indications: Type 2 diabetes mellitus without complication, without long-term current use of insulin (HC) As directed. Dispense meter, test strips, lancets covered by pt ins. E11.9 NIDDM type II - Test 1 time/day 1 Each  0 09/06/2022   Active   lancets   Indications: Type 2 diabetes mellitus without complication, without long-term current use of insulin (HC) As directed. Test 1 times per day. 100 Each    09/06/2022   Active   blood sugar diagnostic (Blood Glucose Test) strip   Indications: Type 2 diabetes mellitus without complication, without long-term current use of insulin (HC) Test 1 times per day. 100 Each    09/06/2022   Active "   atorvastatin (LIPITOR) 20 mg tablet   Indications: Elevated cholesterol, Diabetes mellitus due to underlying condition with hyperosmolarity without coma, without long-term current use of insulin (HC) Take 1 Tablet (20 mg) by mouth at bedtime. 90 Tablet  3 01/24/2023   Active   famotidine (PEPCID) 20 mg tablet   Indications: Chronic GERD Take 1 Tablet (20 mg) by mouth once daily. 90 Tablet  3 02/16/2023   Active   ARIPiprazole (Abilify) 5 mg tablet   Indications: Bipolar 1 disorder (HC) Take 0.5 Tablets (2.5 mg) by mouth once daily. 30 Tablet  3 07/07/2023   Active   hydrOXYzine HCL (ATARAX) 50 mg tablet   Indications: Anxiety Take 1 Tablet (50 mg) by mouth every 8 hours if needed for Itching. 90 Tablet  5 07/07/2023   Active   melatonin 3 mg disintegrating tablet   Indications: Insomnia, idiopathic Take 1 Tablet (3 mg) by mouth at bedtime. 90 Tablet  1 07/07/2023   Active   lithium carbonate 600 mg capsule   Indications: Borderline personality disorder (HC), MDD (major depressive disorder), recurrent episode, moderate (HC) Take 1 Capsule (600 mg) by mouth two times daily. 60 Capsule  3 07/07/2023   Active   sertraline (ZOLOFT) 100 mg tablet   Indications: Borderline personality disorder (HC), MDD (major depressive disorder), recurrent episode, moderate (HC) Take 1 Tablet (100 mg) by mouth every morning. 30 Tablet  5 07/07/2023   Active   traZODone (DESYREL) 150 mg tablet   Indications: Insomnia, unspecified type, MDD (major depressive disorder), recurrent episode, moderate (HC) Take 1 Tablet (150 mg) by mouth at bedtime. 30 Tablet  5 07/07/2023   Active   metFORMIN (GLUCOPHAGE XR) 500 mg Extended-Release tablet   Indications: Type 2 diabetes mellitus without complication, without long-term current use of insulin (HC) TAKE 1 TABLET BY MOUTH DAILY 30 Tablet  0 08/05/2023   Active     Active Problems  Reconcile with Patient's ChartActive Problems  Problem Noted Date Diagnosed Date   TIGIST (generalized anxiety disorder)  "04/07/2023     GERD (gastroesophageal reflux disease) 04/07/2023     Bipolar 1 disorder 02/03/2023     Adjustment disorder with depressed mood 12/20/2022     MDD (major depressive disorder), recurrent episode, moderate 08/25/2022     Insomnia 08/24/2022     Snoring 08/24/2022     Morbid obesity 08/24/2022     Type 2 diabetes mellitus without complication, without long-term current use of insulin 06/07/2022     Dyslipidemia 06/07/2022     Insomnia, idiopathic 06/07/2022     Suicidal ideation 08/14/2020     Severe episode of recurrent major depressive disorder, without psychotic features 04/29/2020     Borderline personality disorder\"         Allergies:  Allergies   Allergen Reactions    Wellbutrin [Bupropion]      Nausea and dizziness       Problem List:    Patient Active Problem List    Diagnosis Date Noted    Disassociation 12/05/2019     Priority: Medium     Associated with PTSD symptoms      Encounter for triage in pregnant patient 10/31/2019     Priority: Medium    Urinary tract infection in mother during third trimester of pregnancy 10/13/2019     Priority: Medium    Housing lack 09/24/2019     Priority: Medium    Prenatal care in third trimester 09/20/2019     Priority: Medium     [place] [facility] at 29 weeks   [place] labs including [place] [facility]    9/24/19 patient living in crisis shelter, needs to be out this week.   Care coordination referral.   Growth U/S 10/18/19: Cephalic fetus.  EFW 5 pounds 4 ounces, 2369 g, 63rd growth percentile.  RAMESH normal, 12.3.      PTSD (post-traumatic stress disorder) 09/20/2019     Priority: Medium     Self reported.  Has services       Depression 09/20/2019     Priority: Medium     Self reported, has services   9/24/19 pt states on meds?  Possible bipolar diagnosis?          Past Medical History:    Past Medical History:   Diagnosis Date    Depression        Past Surgical History:    Past Surgical History:   Procedure Laterality Date    GI SURGERY         Family History:  " "  No family history on file.    Social History:  Marital Status:  Single [1]  Social History     Tobacco Use    Smoking status: Never    Smokeless tobacco: Never   Substance Use Topics    Alcohol use: Not Currently    Drug use: Never        Medications:    acetaminophen (TYLENOL) 500 MG tablet  Cetirizine HCl (ZYRTEC ALLERGY PO)  ciprofloxacin (CIPRO) 500 MG tablet  docusate sodium (COLACE) 100 MG capsule  hydrOXYzine (ATARAX) 25 MG tablet  Omega-3 Fish Oil 500 MG capsule  ondansetron (ZOFRAN ODT) 4 MG ODT tab  Prenatal Vit-Fe Fumarate-FA (PRENATAL MULTIVITAMIN W/IRON) 27-0.8 MG tablet  vitamin B6 (PYRIDOXINE) 25 MG tablet  vitamin D3 (CHOLECALCIFEROL) 2000 units (50 mcg) tablet          Review of Systems  All other systems are reviewed and are negative    Physical Exam   BP: (!) 151/93  Pulse: 88  Temp: 98.8  F (37.1  C)  Resp: 16  Height: 152.4 cm (5')  Weight: 106.6 kg (235 lb)  SpO2: 97 %      Physical Exam  Nursing note and vitals were reviewed.  Constitutional: Awake and alert, overweight appearing 37-year-old in no apparent discomfort, who does not appear acutely ill, and who answers questions appropriately and cooperates with examination.  HEENT: Voice quality is normal.  No angioedema of the lips or tongue.  PERRL.  EOMI.   Neck: Freely mobile.  No adenopathy or masses.  No pain with range of motion of the neck.  Pulmonary/Chest: Breathing is unlabored.   She points to fullness in supraclavicular fossae bilaterally as the area of \"swelling.\"  I am not able to determine if this is swollen because there is so much adipose tissue in this area.  There is no pitting edema.  Musculoskeletal: Extremities are warm and well-perfused and without edema  Neurological: Alert, oriented, thought content logical, coherent   Skin: Warm, dry, no rashes.  Psychiatric: Affect broad and appropriate.    ED Course                 Procedures              Critical Care time:  none               Results for orders placed or " performed during the hospital encounter of 09/15/23 (from the past 24 hour(s))   CT Chest Pulmonary Embolism w Contrast    Narrative    EXAM: CT CHEST PULMONARY EMBOLISM W CONTRAST  LOCATION: [facility]  DATE: 9/15/2023    INDICATION: swelling in supraclavicular fossa bilateral vs obesity  COMPARISON: None.  TECHNIQUE: CT chest pulmonary angiogram during arterial phase injection of IV contrast. Multiplanar reformats and MIP reconstructions were performed. Dose reduction techniques were used.   CONTRAST: 91 mL Isovue 370    FINDINGS:  ANGIOGRAM CHEST: Pulmonary arteries are normal caliber and negative for pulmonary emboli. Thoracic aorta is negative for dissection. No CT evidence of right heart strain.    LUNGS AND PLEURA: Mosaic attenuation. No pneumothorax or pleural effusion.    MEDIASTINUM/AXILLAE: No lymphadenopathy. Normal esophagus. No significant pericardial effusion.    CORONARY ARTERY CALCIFICATION: None.    UPPER ABDOMEN: Hepatic steatosis.    MUSCULOSKELETAL: Slight dextroconvex curvature of the spine.      Impression    IMPRESSION:  1.  No acute pulmonary embolism.    2.  Mosaic attenuation in the lungs is nonspecific, but most commonly related to small airways disease.    3.  No lymphadenopathy or other correlate for supraclavicular swelling.    4.  Hepatic steatosis.       Medications   iopamidol (ISOVUE-370) solution 91 mL (91 mLs Intravenous $Given 9/15/23 1616)   sodium chloride 0.9 % bag 500mL for CT scan flush use (100 mLs Intravenous $Given 9/15/23 1616)       Assessments & Plan (with Medical Decision Making)     37-year-old female presented with concerns for fullness in the supraclavicular fossae bilaterally.  Due to her obesity it is difficult to determine if there was actually any swelling in that area and so we scanned her chest and it was normal.  I reassured her that there is no sign of an ominous cause for this and have no concerns for an ominous cause.  She was  reassured.    I have reviewed the nursing notes.    I have reviewed the findings, diagnosis, plan and need for follow up with the patient.         New Prescriptions    No medications on file       Final diagnoses:   Neck fullness       9/15/2023   [facility] EMERGENCY DEPT       Kalyan Gutierrez MD  09/15/23 8738

## 2023-09-15 NOTE — ED NOTES
"Pt complains of bilateral shoulder/neck pain.  States the swelling began over a month a go.  Pt states she wanted to see her primary care provider today but they did not accept her insurance so she came here.  When asked is she felt short of breath, patient states \"I think I always feel short of breath\".  Pt not in acute distress.  "

## 2023-10-21 ENCOUNTER — TELEPHONE (OUTPATIENT)
Dept: BEHAVIORAL HEALTH | Facility: CLINIC | Age: 38
End: 2023-10-21

## 2023-10-21 ENCOUNTER — HOSPITAL ENCOUNTER (EMERGENCY)
Facility: CLINIC | Age: 38
Discharge: ANOTHER HOSPITAL | End: 2023-10-23
Attending: EMERGENCY MEDICINE | Admitting: EMERGENCY MEDICINE
Payer: COMMERCIAL

## 2023-10-21 DIAGNOSIS — R45.851 DEPRESSION WITH SUICIDAL IDEATION: ICD-10-CM

## 2023-10-21 DIAGNOSIS — T50.902A INTENTIONAL DRUG OVERDOSE, INITIAL ENCOUNTER (H): ICD-10-CM

## 2023-10-21 DIAGNOSIS — F32.A DEPRESSION WITH SUICIDAL IDEATION: ICD-10-CM

## 2023-10-21 LAB
ALBUMIN SERPL BCG-MCNC: 4.1 G/DL (ref 3.5–5.2)
ALBUMIN SERPL BCG-MCNC: 4.1 G/DL (ref 3.5–5.2)
ALP SERPL-CCNC: 63 U/L (ref 35–104)
ALP SERPL-CCNC: 66 U/L (ref 35–104)
ALT SERPL W P-5'-P-CCNC: 23 U/L (ref 0–50)
ALT SERPL W P-5'-P-CCNC: 26 U/L (ref 0–50)
AMPHETAMINES UR QL SCN: NORMAL
ANION GAP SERPL CALCULATED.3IONS-SCNC: 13 MMOL/L (ref 7–15)
ANION GAP SERPL CALCULATED.3IONS-SCNC: 14 MMOL/L (ref 7–15)
APAP SERPL-MCNC: <5 UG/ML (ref 10–30)
AST SERPL W P-5'-P-CCNC: 18 U/L (ref 0–45)
AST SERPL W P-5'-P-CCNC: 21 U/L (ref 0–45)
BARBITURATES UR QL SCN: NORMAL
BASO+EOS+MONOS # BLD AUTO: ABNORMAL 10*3/UL
BASO+EOS+MONOS # BLD AUTO: ABNORMAL 10*3/UL
BASO+EOS+MONOS NFR BLD AUTO: ABNORMAL %
BASO+EOS+MONOS NFR BLD AUTO: ABNORMAL %
BASOPHILS # BLD AUTO: 0 10E3/UL (ref 0–0.2)
BASOPHILS # BLD AUTO: 0 10E3/UL (ref 0–0.2)
BASOPHILS NFR BLD AUTO: 0 %
BASOPHILS NFR BLD AUTO: 0 %
BENZODIAZ UR QL SCN: NORMAL
BILIRUB SERPL-MCNC: 0.2 MG/DL
BILIRUB SERPL-MCNC: <0.2 MG/DL
BUN SERPL-MCNC: 10.5 MG/DL (ref 6–20)
BUN SERPL-MCNC: 7.9 MG/DL (ref 6–20)
BZE UR QL SCN: NORMAL
CALCIUM SERPL-MCNC: 9.2 MG/DL (ref 8.6–10)
CALCIUM SERPL-MCNC: 9.8 MG/DL (ref 8.6–10)
CANNABINOIDS UR QL SCN: NORMAL
CHLORIDE SERPL-SCNC: 104 MMOL/L (ref 98–107)
CHLORIDE SERPL-SCNC: 105 MMOL/L (ref 98–107)
CREAT SERPL-MCNC: 0.61 MG/DL (ref 0.51–0.95)
CREAT SERPL-MCNC: 0.81 MG/DL (ref 0.51–0.95)
DEPRECATED HCO3 PLAS-SCNC: 20 MMOL/L (ref 22–29)
DEPRECATED HCO3 PLAS-SCNC: 23 MMOL/L (ref 22–29)
EGFRCR SERPLBLD CKD-EPI 2021: >90 ML/MIN/1.73M2
EGFRCR SERPLBLD CKD-EPI 2021: >90 ML/MIN/1.73M2
EOSINOPHIL # BLD AUTO: 0.2 10E3/UL (ref 0–0.7)
EOSINOPHIL # BLD AUTO: 0.3 10E3/UL (ref 0–0.7)
EOSINOPHIL NFR BLD AUTO: 3 %
EOSINOPHIL NFR BLD AUTO: 3 %
ERYTHROCYTE [DISTWIDTH] IN BLOOD BY AUTOMATED COUNT: 14.8 % (ref 10–15)
ERYTHROCYTE [DISTWIDTH] IN BLOOD BY AUTOMATED COUNT: 15 % (ref 10–15)
ETHANOL SERPL-MCNC: <0.01 G/DL
FENTANYL UR QL: NORMAL
GLUCOSE SERPL-MCNC: 112 MG/DL (ref 70–99)
GLUCOSE SERPL-MCNC: 113 MG/DL (ref 70–99)
HCT VFR BLD AUTO: 35.9 % (ref 35–47)
HCT VFR BLD AUTO: 36.3 % (ref 35–47)
HGB BLD-MCNC: 11.1 G/DL (ref 11.7–15.7)
HGB BLD-MCNC: 11.3 G/DL (ref 11.7–15.7)
HOLD SPECIMEN: NORMAL
IMM GRANULOCYTES # BLD: 0 10E3/UL
IMM GRANULOCYTES # BLD: 0 10E3/UL
IMM GRANULOCYTES NFR BLD: 0 %
IMM GRANULOCYTES NFR BLD: 0 %
LITHIUM SERPL-SCNC: 1.84 MMOL/L (ref 0.6–1.2)
LITHIUM SERPL-SCNC: 2.01 MMOL/L (ref 0.6–1.2)
LITHIUM SERPL-SCNC: 2.6 MMOL/L (ref 0.6–1.2)
LITHIUM SERPL-SCNC: 2.91 MMOL/L (ref 0.6–1.2)
LYMPHOCYTES # BLD AUTO: 1.6 10E3/UL (ref 0.8–5.3)
LYMPHOCYTES # BLD AUTO: 1.9 10E3/UL (ref 0.8–5.3)
LYMPHOCYTES NFR BLD AUTO: 20 %
LYMPHOCYTES NFR BLD AUTO: 20 %
MCH RBC QN AUTO: 25.8 PG (ref 26.5–33)
MCH RBC QN AUTO: 25.9 PG (ref 26.5–33)
MCHC RBC AUTO-ENTMCNC: 30.9 G/DL (ref 31.5–36.5)
MCHC RBC AUTO-ENTMCNC: 31.1 G/DL (ref 31.5–36.5)
MCV RBC AUTO: 83 FL (ref 78–100)
MCV RBC AUTO: 84 FL (ref 78–100)
MONOCYTES # BLD AUTO: 0.4 10E3/UL (ref 0–1.3)
MONOCYTES # BLD AUTO: 0.5 10E3/UL (ref 0–1.3)
MONOCYTES NFR BLD AUTO: 5 %
MONOCYTES NFR BLD AUTO: 6 %
NEUTROPHILS # BLD AUTO: 5.7 10E3/UL (ref 1.6–8.3)
NEUTROPHILS # BLD AUTO: 6.6 10E3/UL (ref 1.6–8.3)
NEUTROPHILS NFR BLD AUTO: 71 %
NEUTROPHILS NFR BLD AUTO: 72 %
NRBC # BLD AUTO: 0 10E3/UL
NRBC # BLD AUTO: 0 10E3/UL
NRBC BLD AUTO-RTO: 0 /100
NRBC BLD AUTO-RTO: 0 /100
OPIATES UR QL SCN: NORMAL
PCP QUAL URINE (ROCHE): NORMAL
PLATELET # BLD AUTO: 222 10E3/UL (ref 150–450)
PLATELET # BLD AUTO: 228 10E3/UL (ref 150–450)
POTASSIUM SERPL-SCNC: 3.8 MMOL/L (ref 3.4–5.3)
POTASSIUM SERPL-SCNC: 3.8 MMOL/L (ref 3.4–5.3)
PROT SERPL-MCNC: 6.5 G/DL (ref 6.4–8.3)
PROT SERPL-MCNC: 6.6 G/DL (ref 6.4–8.3)
RBC # BLD AUTO: 4.29 10E6/UL (ref 3.8–5.2)
RBC # BLD AUTO: 4.38 10E6/UL (ref 3.8–5.2)
SALICYLATES SERPL-MCNC: <0.3 MG/DL
SARS-COV-2 RNA RESP QL NAA+PROBE: NEGATIVE
SODIUM SERPL-SCNC: 138 MMOL/L (ref 135–145)
SODIUM SERPL-SCNC: 141 MMOL/L (ref 135–145)
TSH SERPL DL<=0.005 MIU/L-ACNC: 3.3 UIU/ML (ref 0.3–4.2)
WBC # BLD AUTO: 8 10E3/UL (ref 4–11)
WBC # BLD AUTO: 9.4 10E3/UL (ref 4–11)

## 2023-10-21 PROCEDURE — 93005 ELECTROCARDIOGRAM TRACING: CPT

## 2023-10-21 PROCEDURE — 80307 DRUG TEST PRSMV CHEM ANLYZR: CPT | Performed by: EMERGENCY MEDICINE

## 2023-10-21 PROCEDURE — 250N000013 HC RX MED GY IP 250 OP 250 PS 637: Performed by: EMERGENCY MEDICINE

## 2023-10-21 PROCEDURE — 96374 THER/PROPH/DIAG INJ IV PUSH: CPT

## 2023-10-21 PROCEDURE — 36415 COLL VENOUS BLD VENIPUNCTURE: CPT | Performed by: FAMILY MEDICINE

## 2023-10-21 PROCEDURE — 99291 CRITICAL CARE FIRST HOUR: CPT | Mod: 25 | Performed by: EMERGENCY MEDICINE

## 2023-10-21 PROCEDURE — 84443 ASSAY THYROID STIM HORMONE: CPT | Performed by: EMERGENCY MEDICINE

## 2023-10-21 PROCEDURE — 99285 EMERGENCY DEPT VISIT HI MDM: CPT | Mod: 25

## 2023-10-21 PROCEDURE — 82077 ASSAY SPEC XCP UR&BREATH IA: CPT | Performed by: EMERGENCY MEDICINE

## 2023-10-21 PROCEDURE — 80178 ASSAY OF LITHIUM: CPT | Performed by: FAMILY MEDICINE

## 2023-10-21 PROCEDURE — 80179 DRUG ASSAY SALICYLATE: CPT | Performed by: EMERGENCY MEDICINE

## 2023-10-21 PROCEDURE — 85025 COMPLETE CBC W/AUTO DIFF WBC: CPT | Performed by: EMERGENCY MEDICINE

## 2023-10-21 PROCEDURE — 80178 ASSAY OF LITHIUM: CPT | Performed by: EMERGENCY MEDICINE

## 2023-10-21 PROCEDURE — 80143 DRUG ASSAY ACETAMINOPHEN: CPT | Performed by: EMERGENCY MEDICINE

## 2023-10-21 PROCEDURE — 87635 SARS-COV-2 COVID-19 AMP PRB: CPT | Performed by: EMERGENCY MEDICINE

## 2023-10-21 PROCEDURE — 250N000011 HC RX IP 250 OP 636: Mod: JZ | Performed by: FAMILY MEDICINE

## 2023-10-21 PROCEDURE — 93005 ELECTROCARDIOGRAM TRACING: CPT | Performed by: EMERGENCY MEDICINE

## 2023-10-21 PROCEDURE — 85025 COMPLETE CBC W/AUTO DIFF WBC: CPT | Mod: 91 | Performed by: FAMILY MEDICINE

## 2023-10-21 PROCEDURE — 84155 ASSAY OF PROTEIN SERUM: CPT | Performed by: FAMILY MEDICINE

## 2023-10-21 PROCEDURE — 258N000003 HC RX IP 258 OP 636: Performed by: EMERGENCY MEDICINE

## 2023-10-21 PROCEDURE — 36415 COLL VENOUS BLD VENIPUNCTURE: CPT | Performed by: EMERGENCY MEDICINE

## 2023-10-21 PROCEDURE — 96361 HYDRATE IV INFUSION ADD-ON: CPT

## 2023-10-21 PROCEDURE — 80053 COMPREHEN METABOLIC PANEL: CPT | Performed by: EMERGENCY MEDICINE

## 2023-10-21 RX ORDER — ONDANSETRON 2 MG/ML
4 INJECTION INTRAMUSCULAR; INTRAVENOUS ONCE
Status: COMPLETED | OUTPATIENT
Start: 2023-10-21 | End: 2023-10-21

## 2023-10-21 RX ORDER — LITHIUM CARBONATE 600 MG/1
600 CAPSULE ORAL 2 TIMES DAILY
COMMUNITY
Start: 2022-04-27 | End: 2023-11-15

## 2023-10-21 RX ORDER — FAMOTIDINE 20 MG/1
1 TABLET, FILM COATED ORAL DAILY
COMMUNITY
Start: 2023-02-16 | End: 2023-10-21

## 2023-10-21 RX ORDER — ATORVASTATIN CALCIUM 20 MG/1
20 TABLET, FILM COATED ORAL AT BEDTIME
Status: DISCONTINUED | OUTPATIENT
Start: 2023-10-21 | End: 2023-10-23 | Stop reason: HOSPADM

## 2023-10-21 RX ORDER — TRAZODONE HYDROCHLORIDE 50 MG/1
150 TABLET, FILM COATED ORAL AT BEDTIME
Status: DISCONTINUED | OUTPATIENT
Start: 2023-10-21 | End: 2023-10-23 | Stop reason: HOSPADM

## 2023-10-21 RX ORDER — ARIPIPRAZOLE 5 MG/1
2.5 TABLET ORAL DAILY
COMMUNITY
Start: 2023-07-07 | End: 2023-11-15

## 2023-10-21 RX ORDER — SERTRALINE HYDROCHLORIDE 100 MG/1
100 TABLET, FILM COATED ORAL EVERY MORNING
Status: DISCONTINUED | OUTPATIENT
Start: 2023-10-22 | End: 2023-10-23 | Stop reason: HOSPADM

## 2023-10-21 RX ORDER — TRAZODONE HYDROCHLORIDE 150 MG/1
1 TABLET ORAL AT BEDTIME
COMMUNITY
Start: 2023-07-07 | End: 2023-11-15

## 2023-10-21 RX ORDER — SODIUM CHLORIDE, SODIUM LACTATE, POTASSIUM CHLORIDE, CALCIUM CHLORIDE 600; 310; 30; 20 MG/100ML; MG/100ML; MG/100ML; MG/100ML
INJECTION, SOLUTION INTRAVENOUS CONTINUOUS
Status: DISCONTINUED | OUTPATIENT
Start: 2023-10-21 | End: 2023-10-23 | Stop reason: HOSPADM

## 2023-10-21 RX ORDER — ATORVASTATIN CALCIUM 20 MG/1
1 TABLET, FILM COATED ORAL AT BEDTIME
Status: ON HOLD | COMMUNITY
Start: 2022-02-22 | End: 2023-11-29

## 2023-10-21 RX ORDER — LITHIUM CARBONATE 300 MG/1
600 CAPSULE ORAL 2 TIMES DAILY
Status: DISCONTINUED | OUTPATIENT
Start: 2023-10-22 | End: 2023-10-23 | Stop reason: HOSPADM

## 2023-10-21 RX ORDER — METFORMIN HCL 500 MG
1 TABLET, EXTENDED RELEASE 24 HR ORAL DAILY
Status: ON HOLD | COMMUNITY
Start: 2023-09-21 | End: 2023-11-29

## 2023-10-21 RX ORDER — METFORMIN HCL 500 MG
500 TABLET, EXTENDED RELEASE 24 HR ORAL DAILY
Status: DISCONTINUED | OUTPATIENT
Start: 2023-10-22 | End: 2023-10-23 | Stop reason: HOSPADM

## 2023-10-21 RX ORDER — HYDROXYZINE HYDROCHLORIDE 50 MG/1
50 TABLET, FILM COATED ORAL 3 TIMES DAILY PRN
Status: ON HOLD | COMMUNITY
Start: 2023-08-22 | End: 2023-11-29

## 2023-10-21 RX ORDER — IBUPROFEN/PSEUDOEPHEDRINE HCL 200MG-30MG
1 TABLET ORAL AT BEDTIME
COMMUNITY
Start: 2023-07-07 | End: 2024-04-17

## 2023-10-21 RX ORDER — HYDROXYZINE HYDROCHLORIDE 25 MG/1
25-50 TABLET, FILM COATED ORAL EVERY 6 HOURS PRN
Status: DISCONTINUED | OUTPATIENT
Start: 2023-10-21 | End: 2023-10-23 | Stop reason: HOSPADM

## 2023-10-21 RX ORDER — SERTRALINE HYDROCHLORIDE 100 MG/1
1 TABLET, FILM COATED ORAL EVERY MORNING
COMMUNITY
Start: 2023-07-07 | End: 2023-11-15

## 2023-10-21 RX ORDER — ARIPIPRAZOLE 5 MG/1
2.5 TABLET ORAL DAILY
Status: DISCONTINUED | OUTPATIENT
Start: 2023-10-22 | End: 2023-10-23 | Stop reason: HOSPADM

## 2023-10-21 RX ADMIN — SODIUM CHLORIDE, POTASSIUM CHLORIDE, SODIUM LACTATE AND CALCIUM CHLORIDE: 600; 310; 30; 20 INJECTION, SOLUTION INTRAVENOUS at 03:14

## 2023-10-21 RX ADMIN — ATORVASTATIN CALCIUM 20 MG: 20 TABLET, FILM COATED ORAL at 22:53

## 2023-10-21 RX ADMIN — SODIUM CHLORIDE, POTASSIUM CHLORIDE, SODIUM LACTATE AND CALCIUM CHLORIDE: 600; 310; 30; 20 INJECTION, SOLUTION INTRAVENOUS at 08:02

## 2023-10-21 RX ADMIN — SODIUM CHLORIDE 1000 ML: 9 INJECTION, SOLUTION INTRAVENOUS at 02:13

## 2023-10-21 RX ADMIN — Medication 3 MG: at 23:02

## 2023-10-21 RX ADMIN — ONDANSETRON 4 MG: 2 INJECTION INTRAMUSCULAR; INTRAVENOUS at 06:43

## 2023-10-21 RX ADMIN — TRAZODONE HYDROCHLORIDE 150 MG: 50 TABLET ORAL at 22:53

## 2023-10-21 RX ADMIN — SODIUM CHLORIDE, POTASSIUM CHLORIDE, SODIUM LACTATE AND CALCIUM CHLORIDE: 600; 310; 30; 20 INJECTION, SOLUTION INTRAVENOUS at 16:00

## 2023-10-21 ASSESSMENT — ENCOUNTER SYMPTOMS
ABDOMINAL PAIN: 0
SHORTNESS OF BREATH: 0
NAUSEA: 1
COUGH: 0
BACK PAIN: 0
HEADACHES: 0
VOMITING: 1
FEVER: 0
LIGHT-HEADEDNESS: 0
DYSPHORIC MOOD: 1

## 2023-10-21 ASSESSMENT — ACTIVITIES OF DAILY LIVING (ADL)
ADLS_ACUITY_SCORE: 35

## 2023-10-21 NOTE — TELEPHONE ENCOUNTER
"S: [facility] ED , DEC  Christopher  calling at 1:35 PM  about a 38 year old/Female presenting with SA     B: Pt arrived via EMS. Presenting problem, stressors: At 1 Am she took 6 lithium and 6 trazodone tablets in a suicide attempt. Pt claims she has \"had enough\".     Pt affect in ED: Constricted  Pt Dx: Major Depressive Disorder and PTSD  Previous [facility] hx? Yes: April 2023  Pt endorses SI with a plan to overdose, says she wish it would have worked    Hx of suicide attempt? Yes: unsure  Pt denies SIB  Pt denies HI   Pt denies hallucinations .   Pt RARS Score: 3    Hx of aggression/violence, sexual offenses, legal concerns, Epic care plan? describe: No  Current concerns for aggression this visit? No  Does pt have a history of Civil Commitment? No  Is Pt their own guardian? Yes    Pt is prescribed medication. Is patient medication compliant? Yes  Pt endorses OP services: Psychiatrist, Therapist, and   CD concerns: None  Acute or chronic medical concerns: None  Does Pt present with specific needs, assistive devices, or exclusionary criteria? None      Pt is ambulatory  Pt is able to perform ADLs independently      A: Pt to be reviewed for [facility] admission. Pt is Voluntary  Preferred placement: [facility]    COVID Symptoms: No  If yes, COVID test required   Utox: Negative   CMP: Abnormalities: CO2 20, glucose 113  CBC: Abnormalities: hemoglobin 11.3, MCH 25.8, MCHC 31.1  HCG: N/A    R: Patient cleared and ready for behavioral bed placement: Yes  Pt placed on [facility] worklist? Yes    Does Patient need a Transfer Center request created? Yes, writer completed Transfer Center request at:  1:43 PM  "

## 2023-10-21 NOTE — CONSULTS
"Diagnostic Evaluation Consultation  Crisis Assessment    Patient Name: Sveta Cuellar  Age:  38 year old  Legal Sex: female  Gender Identity: female  Pronouns:   Race: White  Ethnicity: Choose not to answer  Language: English      Patient was assessed: Virtual: [facility] Crisis Assessment Start Time: 0742 Crisis Assessment Stop Time: 0748  Patient location: [facility] EMERGENCY DEPT                             ED05    Referral Data and Chief Complaint  Sveta Cuellar presents to the ED via EMS. Patient is presenting to the ED for the following concerns: Suicide attempt, Suicidal ideation, Depression.   Factors that make the mental health crisis life threatening or complex are:  Hx of inpatient, pt lives alone.      Informed Consent and Assessment Methods  Explained the crisis assessment process, including applicable information disclosures and limits to confidentiality, assessed understanding of the process, and obtained consent to proceed with the assessment.  Assessment methods included conducting a formal interview with patient, review of medical records, collaboration with medical staff, and obtaining relevant collateral information from family and community providers when available.  : done     Patient response to interventions: acceptance expressed  Coping skills were attempted to reduce the crisis:  none     History of the Crisis   Earlier this morning, pt intentionally took 6 lithium tablets and 6 trazodone tablets in an attempt to overdose. She subsequently called 911 and was delivered the hospital. At the time of interview, pt was still not medically cleared from lithium level. Pt wrote a suicide note in preparation of act. Pt reports that her trigger was \"I just can't take it anymore.\" Pt has a hx of inpatient treatment, suicide attempt, and PTSD and MDD. Pt continues to endorse SI with a plan but denies HI, SIB, and psychosis symptoms. Pt was tearful during clinical " interview.    Brief Psychosocial History  Family:  Single, Children no  Support System:     Employment Status:  unemployed  Source of Income:  social security  Financial Environmental Concerns:  unemployed  Current Hobbies:  television/movies/videos  Barriers in Personal Life:  emotional concerns    Significant Clinical History  Current Anxiety Symptoms:  anxious  Current Depression/Trauma:  withdrawl/isolation, negativistic, impaired decision making, low self esteem, crying or feels like crying, hopelessness, helplessness, sadness, thoughts of death/suicide  Current Somatic Symptoms:  anxious  Current Psychosis/Thought Disturbance:   (none)  Current Eating Symptoms:   (none)  Chemical Use History:  Alcohol: None  Benzodiazepines: None  Opiates: None  Cocaine: None  Marijuana: Occasional  Other Use: None  Withdrawal Symptoms:  (none)  Addictions:  (none)   Past diagnosis:  Depression, PTSD  Family history:  No known history of mental health or chemical health concerns  Past treatment:  Individual therapy, Case management, Psychiatric Medication Management, Primary Care, Inpatient Hospitalization  Details of most recent treatment:  pt currently has a therapist, psychiatrist, and   Other relevant history:  pt has a hx of inpatient mental health hospitalization       Collateral Information  Is there collateral information: No (None provided by pt)     Collateral information name, relationship, phone number:       What happened today:       What is different about patient's functioning:       Concern about alcohol/drug use:      What do you think the patient needs:      Has patient made comments about wanting to kill themselves/others:      If d/c is recommended, can they take part in safety/aftercare planning:       Additional collateral information:        Risk Assessment  Delta Suicide Severity Rating Scale Full Clinical Version:  Suicidal Ideation  Q1 Wish to be Dead (Lifetime): Yes  Q2 Non-Specific  Active Suicidal Thoughts (Lifetime): Yes  3. Active Suicidal Ideation with any Methods (Not Plan) Without Intent to Act (Lifetime): Yes  Q4 Active Suicidal Ideation with Some Intent to Act, Without Specific Plan (Lifetime): Yes  Q5 Active Suicidal Ideation with Specific Plan and Intent (Lifetime): Yes  Q6 Suicide Behavior (Lifetime): yes     Suicidal Behavior (Lifetime)  Actual Attempt (Lifetime): Yes  Total Number of Actual Attempts (Lifetime):  (pt endorses numerous attempts)  Actual Attempt Description (Lifetime): pill overdose  Has subject engaged in non-suicidal self-injurious behavior? (Lifetime): No  Interrupted Attempts (Lifetime): Yes  Total Number of Interrupted Attempts (Lifetime):  (pt endorses numerous attempts)  Aborted or Self-Interrupted Attempt (Lifetime): Yes (pt endorses numerous attempts)  Preparatory Acts or Behavior (Lifetime): Yes  Total Number of Preparatory Acts (Lifetime):  (pt endorses numerous attempts)    Steele Suicide Severity Rating Scale Recent:   Suicidal Ideation (Recent)  Q1 Wished to be Dead (Past Month): yes  Q2 Suicidal Thoughts (Past Month): yes  Q3 Suicidal Thought Method: yes  Q4 Suicidal Intent without Specific Plan: yes  Q5 Suicide Intent with Specific Plan: yes  Within the Past 3 Months?: yes  Level of Risk per Screen: high risk  Intensity of Ideation (Recent)  Most Severe Ideation Rating (Past 1 Month): 4  Frequency (Past 1 Month): Daily or almost daily  Duration (Past 1 Month): Less than 1 hour/some of the time  Controllability (Past 1 Month): Unable to control thoughts  Deterrents (Past 1 Month): Deterrents definitely did not stop you  Reasons for Ideation (Past 1 Month): Completely to end or stop the pain (You couldn't go on living with the pain or how you were feeling)  Suicidal Behavior (Recent)  Actual Attempt (Past 3 Months): Yes  Total Number of Actual Attempts (Past 3 Months): 1  Actual Attempt Description (Past 3 Months): pill overdose  Has subject engaged in  non-suicidal self-injurious behavior? (Past 3 Months): No  Interrupted Attempts (Past 3 Months): No  Aborted or Self-Interrupted Attempt (Past 3 Months): Yes  Total Number of Aborted or Self-Interrupted Attempts (Past 3 Months): 1  Preparatory Acts or Behavior (Past 3 Months): Yes    Environmental or Psychosocial Events: helplessness/hopelessness, impulsivity/recklessness, unemployment/underemployment, other life stressors  Protective Factors: Protective Factors: lives in a responsibly safe and stable environment    Does the patient have thoughts of harming others? Feels Like Hurting Others: no  Previous Attempt to Hurt Others: no  Current presentation:  (n/a)  Is the patient engaging in sexually inappropriate behavior?: no    Is the patient engaging in sexually inappropriate behavior?  no        Mental Status Exam   Affect: Flat  Appearance: Disheveled  Attention Span/Concentration: Attentive  Eye Contact: Variable    Fund of Knowledge: Appropriate   Language /Speech Content: Fluent  Language /Speech Volume: Soft  Language /Speech Rate/Productions: Slow  Recent Memory: Intact  Remote Memory: Intact  Mood: Depressed, Sad  Orientation to Person: Yes   Orientation to Place: Yes  Orientation to Time of Day: Yes  Orientation to Date: Yes     Situation (Do they understand why they are here?): Yes  Psychomotor Behavior: Normal  Thought Content: Suicidal  Thought Form: Intact     Mini-Cog Assessment  Number of Words Recalled:    Clock-Drawing Test:     Three Item Recall:    Mini-Cog Total Score:       Medication  Psychotropic medications:   Medication Orders - Psychiatric (From admission, onward)      None             Current Care Team  Patient Care Team:  No Ref-Primary, Physician as PCP - General Heath Vasquez as Therapist  Nkechi Guzman as Psychiatrist  Miky (last name unkowlucas) as     Diagnosis  Patient Active Problem List   Diagnosis Code    Prenatal care in third trimester Z34.93    PTSD (post-traumatic  stress disorder) F43.10    Depression F32.A    Housing lack Z59.00    Urinary tract infection in mother during third trimester of pregnancy O23.43    Encounter for triage in pregnant patient Z36.89    Disassociation F48.8    Major depressive disorder, recurrent episode, moderate (H) F33.1       Primary Problem This Admission  Active Hospital Problems    Major depressive disorder, recurrent episode, moderate (H)      PTSD (post-traumatic stress disorder)        Clinical Summary and Substantiation of Recommendations   Pt attends the hospital after a suicide attempt with pills. Pt lives alone and continues to endorse suicidal ideation with intent. Pt denies HI, SIB, and psychosis symptoms. If pt returned home there would be a high likelihood of recurrence. Pt is recommended for inpatient mental health for medication management and therapy service. Lower levels of care would not be adequate in attending to current crisis nor attend to safety concerns. Pt is not medically cleared at time of interview. Care providers will need to call intake once pt is cleared. She is voluntary through [facility] and requests [facility] placement.       Imminent risk of harm: Suicidal Behavior  Severe psychiatric, behavioral or other comorbid conditions are appropriate for management at inpatient mental health as indicated by at least one of the following: Psychiatric Symptoms  Severe dysfunction in daily living is present as indicated by at least one of the following: Complete inability to maintain any appropriate aspect of personal responsibility in any adult roles  Situation and expectations are appropriate for inpatient care: Voluntary treatment at lower level of care is not feasible  Inpatient mental health services are necessary to meet patient needs and at least one of the following: Specific condition related to admission diagnosis is present and judged likely to further improve at proposed level of care, Specific condition  related to admission diagnosis is present and judged likely to deteriorate in absence of treatment at proposed level of care      Patient coping skills attempted to reduce the crisis:  none    Disposition  Recommended disposition: Individual Therapy, Medication Management, Inpatient Mental Health        Reviewed case and recommendations with attending provider. Attending Name: Dr. Rich       Attending concurs with disposition: yes       Patient and/or validated legal guardian concurs with disposition:   yes       Final disposition:  inpatient mental health    Legal status on admission: Voluntary/Patient has signed consent for treatment    Assessment Details   Total duration spent with the patient: 16 min     CPT code(s) utilized: Non-Billable    Christopher Wakefield Psychotherapist  [facility] - Triage & Transition Services  Callback: [phone]

## 2023-10-21 NOTE — PROGRESS NOTES

## 2023-10-21 NOTE — ED NOTES
Pt very forthcoming with information and provides good insight into her mental health history and triggering events for this evening. Cell phone,  and clothing locked in the top cabinet.

## 2023-10-21 NOTE — ED NOTES
Poison control called regarding pt, updated on repeat lab results, per poison control pt is cleared and case is closed.

## 2023-10-21 NOTE — ED NOTES
Pt up to the bathroom, ambulated independently. Pt report she feels slightly weak and dizzy - MD aware and spoke with pt. Pt back to bed after some apple juice

## 2023-10-21 NOTE — ED TRIAGE NOTES
Intentional overdose of trazodone 150mg and lithium 600mg, pt suspects 6 tablets but didn't count them. Ingestion was approximately at 0110.     Triage Assessment (Adult)       Row Name 10/21/23 0154          Triage Assessment    Airway WDL WDL        Respiratory WDL    Respiratory WDL WDL        Skin Circulation/Temperature WDL    Skin Circulation/Temperature WDL WDL        Cardiac WDL    Cardiac WDL WDL        Peripheral/Neurovascular WDL    Peripheral Neurovascular WDL WDL

## 2023-10-21 NOTE — ED PROVIDER NOTES
History     Chief Complaint   Patient presents with    Drug Overdose     HPI  Sveta Cuellar is a 38 year old female with history of PTSD and depression presented for evaluation of acute intentional drug overdose.  Patient was found depressed and took extra pills of trazodone at night.  Patient ingested pills around 1 AM and believes he took approximately 6 lithium 60 mg tablets at approximately 6 trazodone 150 mg tablets.  She quickly called 911 and EMS brought her in for evaluation.  Patient reports she called 911 about 30 minutes after her ingestion because she had second thoughts about wanting to die.  Currently admits to depression with ongoing long-term suicidal thoughts.  Patient reports she did become nauseated and vomited after the ingestion.  She does report throwing up some pill fragments.    Allergies:  Allergies   Allergen Reactions    Wellbutrin [Bupropion]      Nausea and dizziness       Problem List:    Patient Active Problem List    Diagnosis Date Noted    Disassociation 12/05/2019     Priority: Medium     Associated with PTSD symptoms      Encounter for triage in pregnant patient 10/31/2019     Priority: Medium    Urinary tract infection in mother during third trimester of pregnancy 10/13/2019     Priority: Medium    Housing lack 09/24/2019     Priority: Medium    Prenatal care in third trimester 09/20/2019     Priority: Medium     CESAR from [facility] at 29 weeks   All labs including GCT done at [facility]    9/24/19 patient living in crisis shelter, needs to be out this week.   Care coordination referral.   Growth U/S 10/18/19: Cephalic fetus.  EFW 5 pounds 4 ounces, 2369 g, 63rd growth percentile.  RAMESH normal, 12.3.      PTSD (post-traumatic stress disorder) 09/20/2019     Priority: Medium     Self reported.  Has services       Depression 09/20/2019     Priority: Medium     Self reported, has services   9/24/19 pt states on meds?  Possible bipolar diagnosis?          Past Medical History:    Past  Medical History:   Diagnosis Date    Depression        Past Surgical History:    Past Surgical History:   Procedure Laterality Date    GI SURGERY         Family History:    No family history on file.    Social History:  Marital Status:  Single [1]  Social History     Tobacco Use    Smoking status: Never    Smokeless tobacco: Never   Substance Use Topics    Alcohol use: Not Currently    Drug use: Never        Medications:    acetaminophen (TYLENOL) 500 MG tablet  Cetirizine HCl (ZYRTEC ALLERGY PO)  ciprofloxacin (CIPRO) 500 MG tablet  docusate sodium (COLACE) 100 MG capsule  hydrOXYzine (ATARAX) 25 MG tablet  Omega-3 Fish Oil 500 MG capsule  ondansetron (ZOFRAN ODT) 4 MG ODT tab  Prenatal Vit-Fe Fumarate-FA (PRENATAL MULTIVITAMIN W/IRON) 27-0.8 MG tablet  vitamin B6 (PYRIDOXINE) 25 MG tablet  vitamin D3 (CHOLECALCIFEROL) 2000 units (50 mcg) tablet          Review of Systems   Constitutional:  Negative for fever.   HENT:  Negative for congestion.    Respiratory:  Negative for cough and shortness of breath.    Cardiovascular:  Negative for chest pain.   Gastrointestinal:  Positive for nausea and vomiting. Negative for abdominal pain.   Musculoskeletal:  Negative for back pain.   Neurological:  Negative for light-headedness and headaches.   Psychiatric/Behavioral:  Positive for dysphoric mood and suicidal ideas.    All other systems reviewed and are negative.      Physical Exam   BP: (!) 148/74  Pulse: 83  Temp: 97.9  F (36.6  C)  Resp: 18  SpO2: 96 %      Physical Exam  Vitals and nursing note reviewed.   Constitutional:       Appearance: Normal appearance. She is obese. She is not ill-appearing or diaphoretic.   HENT:      Head: Atraumatic.   Eyes:      Conjunctiva/sclera: Conjunctivae normal.   Cardiovascular:      Rate and Rhythm: Normal rate.      Pulses: Normal pulses.   Pulmonary:      Effort: Pulmonary effort is normal.   Skin:     General: Skin is warm and dry.      Capillary Refill: Capillary refill takes less  than 2 seconds.   Neurological:      Mental Status: She is alert and oriented to person, place, and time.   Psychiatric:         Attention and Perception: Attention normal.         Mood and Affect: Mood is depressed. Affect is flat.         Speech: Speech normal.         Behavior: Behavior is cooperative.         Thought Content: Thought content includes suicidal ideation. Thought content includes suicidal (Jugular) plan.         ED Course     Mental Health Risk Assessment        PSS-3      Date and Time Over the past 2 weeks have you felt down, depressed, or hopeless? Over the past 2 weeks have you had thoughts of killing yourself? Have you ever attempted to kill yourself? When did this last happen? User   10/21/23 0156 yes yes yes within the last 24 hours (including today) [facility]          C-SSRS ([place])      Date and Time Q1 Wished to be Dead (Past Month) Q2 Suicidal Thoughts (Past Month) Q3 Suicidal Thought Method Q4 Suicidal Intent without Specific Plan Q5 Suicide Intent with Specific Plan Q6 Suicide Behavior (Lifetime) Within the Past 3 Months? RETIRED: Level of Risk per Screen Screening Not Complete User   10/21/23 0156 yes yes yes yes yes -- -- -- -- [facility]                Suicide assessment completed by mental health (D.E.C., LCSW, etc.)       Procedures              EKG Interpretation:      Interpreted by Toy Cardozo MD  Time reviewed: 0215  Symptoms at time of EKG: None   Rhythm: normal sinus   Rate: Normal  Axis: Normal  Ectopy: none  Conduction: normal  ST Segments/ T Waves: T wave inversion V1 and V2  Q Waves: none  Comparison to prior: No old EKG available    Clinical Impression: Sinus rhythm with inverted T wave V1 and V2.  No acute abnormality.               Results for orders placed or performed during the hospital encounter of 10/21/23 (from the past 24 hour(s))   CBC with platelets, differential    Narrative    The following orders were created for panel order CBC with platelets,  differential.  Procedure                               Abnormality         Status                     ---------                               -----------         ------                     CBC with platelets and d...[[id number]]  Abnormal            Final result                 Please view results for these tests on the individual orders.   Comprehensive metabolic panel   Result Value Ref Range    Sodium 138 135 - 145 mmol/L    Potassium 3.8 3.4 - 5.3 mmol/L    Carbon Dioxide (CO2) 20 (L) 22 - 29 mmol/L    Anion Gap 14 7 - 15 mmol/L    Urea Nitrogen 7.9 6.0 - 20.0 mg/dL    Creatinine 0.61 0.51 - 0.95 mg/dL    GFR Estimate >90 >60 mL/min/1.73m2    Calcium 9.2 8.6 - 10.0 mg/dL    Chloride 104 98 - 107 mmol/L    Glucose 113 (H) 70 - 99 mg/dL    Alkaline Phosphatase 66 35 - 104 U/L    AST 21 0 - 45 U/L    ALT 26 0 - 50 U/L    Protein Total 6.5 6.4 - 8.3 g/dL    Albumin 4.1 3.5 - 5.2 g/dL    Bilirubin Total 0.2 <=1.2 mg/dL   Acetaminophen level   Result Value Ref Range    Acetaminophen <5.0 (L) 10.0 - 30.0 ug/mL   Salicylate level   Result Value Ref Range    Salicylate <0.3   mg/dL   TSH with free T4 reflex   Result Value Ref Range    TSH 3.30 0.30 - 4.20 uIU/mL   CBC with platelets and differential   Result Value Ref Range    WBC Count 8.0 4.0 - 11.0 10e3/uL    RBC Count 4.38 3.80 - 5.20 10e6/uL    Hemoglobin 11.3 (L) 11.7 - 15.7 g/dL    Hematocrit 36.3 35.0 - 47.0 %    MCV 83 78 - 100 fL    MCH 25.8 (L) 26.5 - 33.0 pg    MCHC 31.1 (L) 31.5 - 36.5 g/dL    RDW 14.8 10.0 - 15.0 %    Platelet Count 222 150 - 450 10e3/uL    % Neutrophils 72 %    % Lymphocytes 20 %    % Monocytes 5 %    Mids % (Monos, Eos, Basos)      % Eosinophils 3 %    % Basophils 0 %    % Immature Granulocytes 0 %    NRBCs per 100 WBC 0 <1 /100    Absolute Neutrophils 5.7 1.6 - 8.3 10e3/uL    Absolute Lymphocytes 1.6 0.8 - 5.3 10e3/uL    Absolute Monocytes 0.4 0.0 - 1.3 10e3/uL    Mids Abs (Monos, Eos, Basos)      Absolute Eosinophils 0.2 0.0 - 0.7  10e3/uL    Absolute Basophils 0.0 0.0 - 0.2 10e3/uL    Absolute Immature Granulocytes 0.0 <=0.4 10e3/uL    Absolute NRBCs 0.0 10e3/uL   Alcohol level blood   Result Value Ref Range    Alcohol ethyl <0.01 <=0.01 g/dL   Lithium level   Result Value Ref Range    Lithium 1.84 (H) 0.60 - 1.20 mmol/L   Asymptomatic COVID-19 Virus (Coronavirus) by PCR Nasopharyngeal    Specimen: Nasopharyngeal; Swab   Result Value Ref Range    SARS CoV2 PCR Negative Negative    Narrative    Testing was performed using the Xpert Xpress SARS-CoV-2 Assay on the Cepheid Gene-Xpert Instrument Systems. Additional information about this Emergency Use Authorization (EUA) assay can be found via the Lab Guide. This test should be ordered for the detection of SARS-CoV-2 in individuals who meet SARS-CoV-2 clinical and/or epidemiological criteria as well as from individuals without symptoms or other reasons to suspect COVID-19. Test performance for asymptomatic patients has only been established in anterior nasal swab specimens. This test is for in vitro diagnostic use under the FDA EUA for laboratories certified under CLIA to perform high complexity testing. This test has not been FDA cleared or approved. A negative result does not rule out the presence of PCR inhibitors in the specimen or target RNA concentration below the limit of detection for the assay. The possibility of a false negative should be considered if the patient's recent exposure or clinical presentation suggests COVID-19. This test was validated by the [facility]. This laboratory is certified under the Clinical Laboratory Improvement Amendments (CLIA) as qualified to perform high complexity laboratory testing.         Medications   lactated ringers infusion ( Intravenous $New Bag 10/21/23 7304)   sodium chloride 0.9% BOLUS 1,000 mL (0 mLs Intravenous Stopped 10/21/23 0937)     4:56 AM Patient re-assessed: Patient got up to the bathroom.  Felt slightly  lightheaded with walking but stable on her feet.  Has no other complaints.  No further nausea or vomiting.  Plan for 6 AM blood draw to repeat her lithium level.    6:37 AM: Patient was signed out at shift change to Dr Rich. Will continue to monitor lithium levels and symptoms. When clinically clear from her overdose, DEC assessment for her suicidal thoughts.        Assessments & Plan (with Medical Decision Making)  38-year-old female with history of depression with suicidal thoughts presenting for evaluation of increasing depression with suicidal thoughts and an intentional drug overdose tonight.  Patient ingested several lithium and trazodone pills.  She became nauseated and vomited and called 911.  Came in feeling somewhat remorseful of her choice.  Nausea has resolved and no longer having any other symptoms.  Case reviewed with poison control who recommended serial lithium levels every 4 hours until levels decreasing.  Monitored in the ED for adverse effects of her overdose.  Plan to monitor until stable from an overdose standpoint and then evaluated by DEC for probable inpatient admission.     I have reviewed the nursing notes.    I have reviewed the findings, diagnosis, plan and need for follow up with the patient.       Critical Care Addendum  My initial assessment, based on my review of nursing observations, review of vital signs, focused history, and physical exam, established a high suspicion that Sveta Cuellar has a dangerous drug overdose, which requires immediate intervention, and therefore she is critically ill.     After the initial assessment, the care team initiated multiple lab tests and initiated IV fluid administration to provide stabilization care. Due to the critical nature of this patient, I reassessed nursing observations, vital signs, physical exam, review of cardiac rhythm monitor, 12 lead ECG analysis, mental status, and respiratory status multiple times prior to her disposition.      Time also spent performing documentation, reviewing test results, discussion with consultants, and coordination of care.     Critical care time (excluding teaching time and procedures): 30 minutes.               New Prescriptions    No medications on file       Final diagnoses:   Intentional drug overdose, initial encounter (H)   Depression with suicidal ideation       10/21/2023   [facility] EMERGENCY DEPT       Cardozo, Toy Fair MD  10/21/23 0637

## 2023-10-21 NOTE — ED NOTES
Rylee from Poison control called for update on pt, will continue to follow and call back at 1200 for another update.

## 2023-10-21 NOTE — ED NOTES
Discussed overdose of lithium 600 mg and trazodone 150 mg with Poison Control.     Trazodone: cause sleepiness, hypotension, prolonged Qtc. Peaks at 2 hrs (time taken 0115). Check EKG    Lithium: early GI sx of nausea/vomiting to tremors/confusion. Peak of immediate release 2-5 hours. Check lithium level now, 4 hours later x 2-3 times. Provide prophylactic hydration due to affects on kidneys.     Also order acetaminophen level and chemistry panel.     Updated Dr Cardozo and ok to order per Dr Cardozo.

## 2023-10-21 NOTE — ED NOTES
Spoke with Poison Control. Updated on down trending Lithium Level. They recommend one more Lithium @ 1400 to assure continued down trend of Lithium Level. No need to delay bed placement at this time.

## 2023-10-21 NOTE — PLAN OF CARE
Sveta MADRIGAL [place]  October 21, 2023  Plan of Care Hand-off Note     Patient Care Path: inpatient mental health    Plan for Care:   Pt attends the hospital after a suicide attempt with pills. Pt lives alone and continues to endorse suicidal ideation with intent. Pt denies HI, SIB, and psychosis symptoms. If pt returned home there would be a high likelihood of reoccurence. Pt is recommended for inpatient mental health for mediciation management and therapy service. Lower levels of care would not be adequate in attending to current crisis nor attend to safety concerns. Pt is not medically cleared at time of interview. Care providers will need to call intake once pt is cleared. She is voluntary through [facility] and [facility].    EDIT: Pt is medically cleared and intake was called, she is on the list    Identified Goals and Safety Issues:  SI with plan    Overview:       Legal Status: Legal Status at Admission: Voluntary/Patient has signed consent for treatment    Psychiatry Consult: not ordered    Updated Dr. Rich regarding plan of care.      Christopher Wakefield

## 2023-10-21 NOTE — ED PROVIDER NOTES
"     Emergency Department Patient Sign-out     ED Summary and Plan at Sign Out:  Patient is a 38-year-old female presenting with acute intentional drug overdose.  At about 1:00 AM she took 6 lithium 60 mg tablets and 6 trazodone 150 mg tablets.  Patient called 911 herself.  She did become nauseous and vomited after the ingestion.  She describes throwing up some pill fragments.  Blood pressure on arrival 148/74.  Pulse 83.  Temperature 97.9.  O2 saturation 96%.  Depressed with flat affect.  Blood work unremarkable.  Initial lithium level 1.84.  Lithium level increased this morning.  Mental health assessment performed and recommending admission for mental health cares.     ED MDM:  0807.  Mental health assessment performed and recommending admission for mental health cares.  Not currently medically cleared with increased lithium level.    1319.  Lithium level has decreased.  Medically cleared for placement at a mental health facility.  Poison control is requesting 1 more lithium test, \"because the lithium can be protein bound and we should make sure that its actually going down again.\"    IMPRESSION:    ICD-10-CM    1. Intentional drug overdose, initial encounter (H)  T50.902A       2. Depression with suicidal ideation  F32.A     R45.851                Leo Rich MD  10/23/23 2126    "

## 2023-10-22 LAB — LITHIUM SERPL-SCNC: 0.65 MMOL/L (ref 0.6–1.2)

## 2023-10-22 PROCEDURE — 80178 ASSAY OF LITHIUM: CPT | Performed by: FAMILY MEDICINE

## 2023-10-22 PROCEDURE — 250N000013 HC RX MED GY IP 250 OP 250 PS 637: Performed by: FAMILY MEDICINE

## 2023-10-22 PROCEDURE — 36415 COLL VENOUS BLD VENIPUNCTURE: CPT | Performed by: FAMILY MEDICINE

## 2023-10-22 PROCEDURE — 250N000013 HC RX MED GY IP 250 OP 250 PS 637: Performed by: EMERGENCY MEDICINE

## 2023-10-22 RX ORDER — POLYETHYLENE GLYCOL 3350 17 G/17G
17 POWDER, FOR SOLUTION ORAL DAILY
Status: DISCONTINUED | OUTPATIENT
Start: 2023-10-22 | End: 2023-10-23 | Stop reason: HOSPADM

## 2023-10-22 RX ADMIN — METFORMIN HYDROCHLORIDE 500 MG: 500 TABLET, EXTENDED RELEASE ORAL at 08:14

## 2023-10-22 RX ADMIN — ATORVASTATIN CALCIUM 20 MG: 20 TABLET, FILM COATED ORAL at 21:42

## 2023-10-22 RX ADMIN — POLYETHYLENE GLYCOL 3350 17 G: 17 POWDER, FOR SOLUTION ORAL at 08:38

## 2023-10-22 RX ADMIN — TRAZODONE HYDROCHLORIDE 150 MG: 50 TABLET ORAL at 21:42

## 2023-10-22 RX ADMIN — Medication 3 MG: at 22:04

## 2023-10-22 RX ADMIN — ARIPIPRAZOLE 2.5 MG: 5 TABLET ORAL at 08:14

## 2023-10-22 RX ADMIN — SERTRALINE HYDROCHLORIDE 100 MG: 100 TABLET ORAL at 08:14

## 2023-10-22 ASSESSMENT — ACTIVITIES OF DAILY LIVING (ADL)
ADLS_ACUITY_SCORE: 35

## 2023-10-22 NOTE — PROGRESS NOTES
"Triage & Transition Services, Extended Care     Therapy Progress Note & Reassessment    Patient: Sveta goes by \"Sveta,\" uses she/her pronouns  Date of Service: October 22, 2023  Site of Service: [facility]  Patient was seen virtually ([employer] cart or other teleconferencing device).     Presenting problem:   Sveta is followed related to  awaiting admission to mental health unit . Please see initial DEC/[facility] Crisis Assessment completed by Christopher Wakefield on 10/21/23 for complete assessment information. Notable concerns include suicide attempt, current suicidality, history of depression and anxiety.     Individuals Present: Kirill Bangura Reid Klompenhower    Session start: 11:55am  Session end: 12:19pm  Session duration in minutes: 24 minutes  Session number: 1  Anticipated number of sessions or this episode of care: 1  CPT utilized: 46456 - Psychotherapy (with patient) - 30 (16-37*) min    Current Presentation:   This writer met with patient for reassessment and check in. This writer and Sveta discussed circumstances that led to her admission with her reporting increasing SI over the last month that overwhelmed her leading to suicide attempt. Pt presents with low mood and was tearful throughout session. Pt reports history of depression and anxiety which causes distress and functional impairment. Sveta reports current SI and wanting admission to support her ongoing safety and mental health needs. This writer and Sveta discussed her current involvement in care. Pt reports participating in DBT 3x weekly and meeting with therapist 1-2x a week; however, this level of outpatient care is not sufficiently meeting her mental health needs. This writer and pt discussed coping skills with reports of music, poetry, and other positive distractions like TV. Moreover, she reports using visualization to compartmentalize her negative thoughts and anxieties.    This writer confirmed that pt is open to " admission across the state including [place], [place].      Mental Status Exam:   Appearance: awake, alert and dressed in hospital scrubs  Attitude: cooperative  Eye Contact: good  Mood: sad  and depressed  Affect: mood congruent  Speech: clear, coherent  Psychomotor Behavior: no evidence of tardive dyskinesia, dystonia, or tics  Thought Process:  linear  Associations: no loose associations  Thought Content: active suicidal ideation present and passive suicidal ideation present  Insight: fair  Judgement: fair  Oriented to: time, person, and place  Attention Span and Concentration: intact  Recent and Remote Memory: fair    Diagnosis:   Major depressive disorder, recurrent episode, moderate (H) F33.1     PTSD (post-traumatic stress disorder) F43.10       Therapeutic Intervention(s):   Provided active listening, unconditional positive regard, and validation. Engaged in safety planning.  Coached on coping techniques/relaxation skills to help improve distress tolerance and managing intense emotions. Provided positive reinforcement for progress towards goals, gains in knowledge, and application of skills previously taught.  Engaged in relaxation training (e.g. meditation, progressive muscle relaxation, etc.).    Treatment Objective(s) Addressed:   The focus of this session was on assessing safety and identifying treatment goals.     Progress Towards Goals:   Patient reports  the same  symptoms. Patient is making progress towards treatment goals as evidenced by openness to admission for hospitalization and endorsing wanting help.     General Recommendations:   Continue to monitor for harm. Consider: Consider 1:1 staffing    Plan:   Inpatient Mental Health: admission recommendation due to continued endorsement of suicidality. Pt is voluntary in agreement to in-patient admission and reports it is the level of care she needs at this time to maintain her safety.    Plan for Care reviewed with Assigned Medical Provider? Yes.  Provider, Dr Phillips, response: agreed     Kirill Peñaloza   Licensed Mental Health Professional (LMHP), [facility]  [phone]

## 2023-10-22 NOTE — TELEPHONE ENCOUNTER
Called ED @ 00:24 to confirm pt's placement preference as she is voluntary. RN spoke w/ pt who would [facility] the [facility] for now but may change her mind at a later time    No appropriate beds are currently available within the  system. Bed search update ([facility]) @ 12:10AM:          [facility]: @ cap per website. Per Bethany @ 00:12 they are full   [facility]: @ cap per website   [facility]: @ cap per website. Per Angela @ 00:12, they are full    [facility]: @ cap per website   [facility]: @ cap per website   [facility]: Posting 3 beds (Young Adult unit: No recent aggressions, violence or sexual assault. Neg covid required. Vol only). Pt does not meet age requirement    [facility]: @ cap per website   [facility]: @ cap per website   [facility]: @ cap per website    Pt remains on work list until appropriate placement is available

## 2023-10-22 NOTE — ED NOTES
Pt is calm and cooperative. Flat affect. Denied pain, SOB, N/V. Pt ambulated with 1:1 to the restroom. Gait is steady. Vitals obtained and assessment performed.

## 2023-10-22 NOTE — ED NOTES
Pt awake and watching TV.  Pt appears brighter and more talkative today, she states she is felling better.  Pt asking for a shower after breakfast.  Assessment compete and pt reports unable to have a BM since arrival.    Discussed with patient placement, and she has no memory of being asked to go to [place] last night.  Pt states she is agreeable to transfer up there for placement.  HUC and charge RN updated.  Pt medicated per orders.  Lithium remains on hold at this time.   PLAN:  WIll continue to monitor, await placement.  Sitter will take pt to shower after breakfast, Miralax ordered for constipation.'

## 2023-10-22 NOTE — ED NOTES
Writer spoke to provider Dr. ASHLEY Phillips regarding lab work and continuous monitoring. VORB per Dr. Phillips to discontinue continuous monitoring, fluids and order CBC and CMP.

## 2023-10-22 NOTE — ED PROVIDER NOTES
Emergency Department Psychiatric Patient Sign-out       Brief HPI:  This is a 38 year old female signed out to me by Dr. Phillips .  See initial ED Provider note for details of the presentation.     Patient is medically cleared for admission to a Behavioral Health unit.      Pending studies include none.      The patient is not on a hold.      The patient has not required medication for agitation.    Medications   lactated ringers infusion (0 mLs Intravenous Stopped 10/21/23 2008)   ARIPiprazole (ABILIFY) half-tab 2.5 mg (has no administration in time range)   atorvastatin (LIPITOR) tablet 20 mg (has no administration in time range)   hydrOXYzine (ATARAX) tablet 25-50 mg (has no administration in time range)   lithium (ESKALITH) capsule 600 mg (has no administration in time range)   metFORMIN (GLUCOPHAGE XR) 24 hr tablet 500 mg (has no administration in time range)   sertraline (ZOLOFT) tablet 100 mg (has no administration in time range)   traZODone (DESYREL) tablet 150 mg (has no administration in time range)   sodium chloride 0.9% BOLUS 1,000 mL (0 mLs Intravenous Stopped 10/21/23 0404)   ondansetron (ZOFRAN) injection 4 mg (4 mg Intravenous $Given 10/21/23 0643)       Exam:   Patient Vitals for the past 24 hrs:   BP Temp Temp src Pulse Resp SpO2   10/21/23 2002 (!) 145/82 98.1  F (36.7  C) Oral 86 18 97 %   10/21/23 1800 (!) 144/80 -- -- 86 -- 93 %   10/21/23 1700 -- -- -- 90 20 94 %   10/21/23 1600 134/80 -- -- 82 16 94 %   10/21/23 1500 138/74 -- -- 85 21 92 %   10/21/23 1445 -- -- -- 89 22 95 %   10/21/23 1430 -- -- -- 93 19 92 %   10/21/23 1415 -- -- -- 96 11 92 %   10/21/23 1400 (!) 142/76 -- -- 84 26 94 %   10/21/23 1345 -- -- -- -- 25 94 %   10/21/23 1330 -- -- -- 84 21 94 %   10/21/23 1315 -- -- -- -- 26 94 %   10/21/23 1300 (!) 143/72 -- -- 79 22 93 %   10/21/23 1245 -- -- -- 75 17 93 %   10/21/23 1230 -- -- -- -- 26 --   10/21/23 1215 -- -- -- 77 26 94 %   10/21/23 1200 (!) 156/97 -- -- 72 24 95 %    10/21/23 1145 -- -- -- -- 28 96 %   10/21/23 1130 -- -- -- 79 26 95 %   10/21/23 1115 -- -- -- 79 19 93 %   10/21/23 1100 (!) 139/91 -- -- 79 16 93 %   10/21/23 1030 -- -- -- 79 16 93 %   10/21/23 1015 -- -- -- 82 15 92 %   10/21/23 1000 139/77 -- -- 80 15 92 %   10/21/23 0945 -- -- -- 85 16 92 %   10/21/23 0930 -- -- -- 84 18 93 %   10/21/23 0900 131/73 -- -- -- -- --   10/21/23 0845 -- -- -- 73 17 94 %   10/21/23 0830 -- -- -- 81 12 --   10/21/23 0815 -- -- -- 70 11 95 %   10/21/23 0800 130/77 -- -- 75 12 --   10/21/23 0745 -- -- -- 89 21 --   10/21/23 0730 128/74 -- -- 75 13 --   10/21/23 0715 -- -- -- 75 16 --   10/21/23 0700 128/75 -- -- 75 16 --   10/21/23 0645 -- -- -- 77 16 --   10/21/23 0630 126/77 -- -- 77 14 95 %   10/21/23 0615 -- -- -- 74 15 94 %   10/21/23 0600 127/78 -- -- 76 17 94 %   10/21/23 0545 -- -- -- 78 18 94 %   10/21/23 0530 121/81 -- -- 74 15 94 %   10/21/23 0515 -- -- -- 76 16 94 %   10/21/23 0500 (!) 138/99 -- -- -- -- --   10/21/23 0445 -- -- -- 75 15 95 %   10/21/23 0430 131/73 -- -- 79 13 95 %   10/21/23 0400 120/67 -- -- 80 12 95 %   10/21/23 0330 122/62 -- -- 82 15 93 %   10/21/23 0300 131/68 -- -- 78 16 95 %   10/21/23 0243 -- 97.9  F (36.6  C) -- -- -- --   10/21/23 0231 135/75 -- -- 85 18 --   10/21/23 0200 -- -- -- -- -- 96 %   10/21/23 0154 (!) 148/74 -- -- 83 18 96 %         ED Course:  10:28 PM: I reconciled her home meds.  Her lithium level has been dropping.  Given the current rate of decrease, would anticipate her being therapeutic or possibly subtherapeutic by tomorrow morning.  We will resume her lithium tomorrow morning at her normal dosing.  Other meds resumed per normal scheduling.      There were no significant events while under my care.      Patient was signed out to the oncoming provider. Dr. Rich      Impression:    ICD-10-CM    1. Intentional drug overdose, initial encounter (H)  T50.902A       2. Depression with suicidal ideation  F32.A     R45.851            Plan:    1. Await Transfer to [facility]      RESULTS:   Results for orders placed or performed during the hospital encounter of 10/21/23 (from the past 24 hour(s))   CBC with platelets, differential     Status: Abnormal    Collection Time: 10/21/23  2:11 AM    Narrative    The following orders were created for panel order CBC with platelets, differential.  Procedure                               Abnormality         Status                     ---------                               -----------         ------                     CBC with platelets and d...[[id number]]  Abnormal            Final result                 Please view results for these tests on the individual orders.   Comprehensive metabolic panel     Status: Abnormal    Collection Time: 10/21/23  2:11 AM   Result Value Ref Range    Sodium 138 135 - 145 mmol/L    Potassium 3.8 3.4 - 5.3 mmol/L    Carbon Dioxide (CO2) 20 (L) 22 - 29 mmol/L    Anion Gap 14 7 - 15 mmol/L    Urea Nitrogen 7.9 6.0 - 20.0 mg/dL    Creatinine 0.61 0.51 - 0.95 mg/dL    GFR Estimate >90 >60 mL/min/1.73m2    Calcium 9.2 8.6 - 10.0 mg/dL    Chloride 104 98 - 107 mmol/L    Glucose 113 (H) 70 - 99 mg/dL    Alkaline Phosphatase 66 35 - 104 U/L    AST 21 0 - 45 U/L    ALT 26 0 - 50 U/L    Protein Total 6.5 6.4 - 8.3 g/dL    Albumin 4.1 3.5 - 5.2 g/dL    Bilirubin Total 0.2 <=1.2 mg/dL   Acetaminophen level     Status: Abnormal    Collection Time: 10/21/23  2:11 AM   Result Value Ref Range    Acetaminophen <5.0 (L) 10.0 - 30.0 ug/mL   Salicylate level     Status: Normal    Collection Time: 10/21/23  2:11 AM   Result Value Ref Range    Salicylate <0.3   mg/dL   TSH with free T4 reflex     Status: Normal    Collection Time: 10/21/23  2:11 AM   Result Value Ref Range    TSH 3.30 0.30 - 4.20 uIU/mL   CBC with platelets and differential     Status: Abnormal    Collection Time: 10/21/23  2:11 AM   Result Value Ref Range    WBC Count 8.0 4.0 - 11.0 10e3/uL    RBC Count 4.38  3.80 - 5.20 10e6/uL    Hemoglobin 11.3 (L) 11.7 - 15.7 g/dL    Hematocrit 36.3 35.0 - 47.0 %    MCV 83 78 - 100 fL    MCH 25.8 (L) 26.5 - 33.0 pg    MCHC 31.1 (L) 31.5 - 36.5 g/dL    RDW 14.8 10.0 - 15.0 %    Platelet Count 222 150 - 450 10e3/uL    % Neutrophils 72 %    % Lymphocytes 20 %    % Monocytes 5 %    Mids % (Monos, Eos, Basos)      % Eosinophils 3 %    % Basophils 0 %    % Immature Granulocytes 0 %    NRBCs per 100 WBC 0 <1 /100    Absolute Neutrophils 5.7 1.6 - 8.3 10e3/uL    Absolute Lymphocytes 1.6 0.8 - 5.3 10e3/uL    Absolute Monocytes 0.4 0.0 - 1.3 10e3/uL    Mids Abs (Monos, Eos, Basos)      Absolute Eosinophils 0.2 0.0 - 0.7 10e3/uL    Absolute Basophils 0.0 0.0 - 0.2 10e3/uL    Absolute Immature Granulocytes 0.0 <=0.4 10e3/uL    Absolute NRBCs 0.0 10e3/uL   Alcohol level blood     Status: Normal    Collection Time: 10/21/23  2:11 AM   Result Value Ref Range    Alcohol ethyl <0.01 <=0.01 g/dL   Lithium level     Status: Abnormal    Collection Time: 10/21/23  2:11 AM   Result Value Ref Range    Lithium 1.84 (H) 0.60 - 1.20 mmol/L   Asymptomatic COVID-19 Virus (Coronavirus) by PCR Nasopharyngeal     Status: Normal    Collection Time: 10/21/23  2:13 AM    Specimen: Nasopharyngeal; Swab   Result Value Ref Range    SARS CoV2 PCR Negative Negative    Narrative    Testing was performed using the Xpert Xpress SARS-CoV-2 Assay on the Cepheid Gene-Xpert Instrument Systems. Additional information about this Emergency Use Authorization (EUA) assay can be found via the Lab Guide. This test should be ordered for the detection of SARS-CoV-2 in individuals who meet SARS-CoV-2 clinical and/or epidemiological criteria as well as from individuals without symptoms or other reasons to suspect COVID-19. Test performance for asymptomatic patients has only been established in anterior nasal swab specimens. This test is for in vitro diagnostic use under the FDA EUA for laboratories certified under CLIA to perform high  complexity testing. This test has not been FDA cleared or approved. A negative result does not rule out the presence of PCR inhibitors in the specimen or target RNA concentration below the limit of detection for the assay. The possibility of a false negative should be considered if the patient's recent exposure or clinical presentation suggests COVID-19. This test was validated by the [facility]. This laboratory is certified under the Clinical Laboratory Improvement Amendments (CLIA) as qualified to perform high complexity laboratory testing.     Urine Drug Screen     Status: Normal    Collection Time: 10/21/23  5:02 AM    Narrative    The following orders were created for panel order Urine Drug Screen.  Procedure                               Abnormality         Status                     ---------                               -----------         ------                     Urine Drug Screen Panel[[id number]]      Normal              Final result                 Please view results for these tests on the individual orders.   Urine Drug Screen Panel     Status: Normal    Collection Time: 10/21/23  5:02 AM   Result Value Ref Range    Amphetamines Urine Screen Negative Screen Negative    Barbituates Urine Screen Negative Screen Negative    Benzodiazepine Urine Screen Negative Screen Negative    Cannabinoids Urine Screen Negative Screen Negative    Cocaine Urine Screen Negative Screen Negative    Fentanyl Qual Urine Screen Negative Screen Negative    Opiates Urine Screen Negative Screen Negative    PCP Urine Screen Negative Screen Negative   Lithium level     Status: Abnormal    Collection Time: 10/21/23  6:03 AM   Result Value Ref Range    Lithium 2.91 (HH) 0.60 - 1.20 mmol/L   [facility] ([facility] Assessment Consult Order:     Status: None ()    Collection Time: 10/21/23  6:07 AM    Christopher Wang     10/21/2023  9:07 AM  Diagnostic Evaluation  "Consultation  Crisis Assessment    Patient Name: Sveta Cuellar  Age:  38 year old  Legal Sex: female  Gender Identity: female  Pronouns:   Race: White  Ethnicity: Choose not to answer  Language: English      Patient was assessed: Virtual: [facility] Crisis Assessment Start   Time: 0742 Crisis Assessment Stop Time: 0748  Patient location: [facility] EMERGENCY DEPT                             ED05    Referral Data and Chief Complaint  Sveta Cuellar presents to the ED via EMS. Patient is   presenting to the ED for the following concerns: Suicide attempt,   Suicidal ideation, Depression.   Factors that make the mental   health crisis life threatening or complex are:  Hx of inpatient,   pt lives alone.      Informed Consent and Assessment Methods  Explained the crisis assessment process, including applicable   information disclosures and limits to confidentiality, assessed   understanding of the process, and obtained consent to proceed   with the assessment.  Assessment methods included conducting a   formal interview with patient, review of medical records,   collaboration with medical staff, and obtaining relevant   collateral information from family and community providers when   available.  : done     Patient response to interventions: acceptance expressed  Coping skills were attempted to reduce the crisis:  none     History of the Crisis   Earlier this morning, pt intentionally took 6 lithium tablets and   6 trazodone tablets in an attempt to overdose. She subsequently   called 911 and was delivered the hospital. At the time of   interview, pt was still not medically cleared from lithium level.   Pt wrote a suicide note in preparation of act. Pt reports that   her trigger was \"I just can't take it anymore.\" Pt has a hx of   inpatient treatment, suicide attempt, and PTSD and MDD. Pt   continues to endorse SI with a plan but denies HI, SIB, and   psychosis symptoms. Pt was tearful during clinical " interview.    Brief Psychosocial History  Family:  Single, Children no  Support System:     Employment Status:  unemployed  Source of Income:  social security  Financial Environmental Concerns:  unemployed  Current Hobbies:  television/movies/videos  Barriers in Personal Life:  emotional concerns    Significant Clinical History  Current Anxiety Symptoms:  anxious  Current Depression/Trauma:  withdrawl/isolation, negativistic,   impaired decision making, low self esteem, crying or feels like   crying, hopelessness, helplessness, sadness, thoughts of   death/suicide  Current Somatic Symptoms:  anxious  Current Psychosis/Thought Disturbance:   (none)  Current Eating Symptoms:   (none)  Chemical Use History:  Alcohol: None  Benzodiazepines: None  Opiates: None  Cocaine: None  Marijuana: Occasional  Other Use: None  Withdrawal Symptoms:  (none)  Addictions:  (none)   Past diagnosis:  Depression, PTSD  Family history:  No known history of mental health or chemical   health concerns  Past treatment:  Individual therapy, Case management, Psychiatric   Medication Management, Primary Care, Inpatient Hospitalization  Details of most recent treatment:  pt currently has a therapist,   psychiatrist, and   Other relevant history:  pt has a hx of inpatient mental health   hospitalization       Collateral Information  Is there collateral information: No (None provided by pt)     Collateral information name, relationship, phone number:       What happened today:       What is different about patient's functioning:       Concern about alcohol/drug use:      What do you think the patient needs:      Has patient made comments about wanting to kill   themselves/others:      If d/c is recommended, can they take part in safety/aftercare   planning:       Additional collateral information:        Risk Assessment  [place] Suicide Severity Rating Scale Full Clinical Version:  Suicidal Ideation  Q1 Wish to be Dead (Lifetime):  Yes  Q2 Non-Specific Active Suicidal Thoughts (Lifetime): Yes  3. Active Suicidal Ideation with any Methods (Not Plan) Without   Intent to Act (Lifetime): Yes  Q4 Active Suicidal Ideation with Some Intent to Act, Without   Specific Plan (Lifetime): Yes  Q5 Active Suicidal Ideation with Specific Plan and Intent   (Lifetime): Yes  Q6 Suicide Behavior (Lifetime): yes     Suicidal Behavior (Lifetime)  Actual Attempt (Lifetime): Yes  Total Number of Actual Attempts (Lifetime):  (pt endorses   numerous attempts)  Actual Attempt Description (Lifetime): pill overdose  Has subject engaged in non-suicidal self-injurious behavior?   (Lifetime): No  Interrupted Attempts (Lifetime): Yes  Total Number of Interrupted Attempts (Lifetime):  (pt endorses   numerous attempts)  Aborted or Self-Interrupted Attempt (Lifetime): Yes (pt endorses   numerous attempts)  Preparatory Acts or Behavior (Lifetime): Yes  Total Number of Preparatory Acts (Lifetime):  (pt endorses   numerous attempts)    [place] Suicide Severity Rating Scale Recent:   Suicidal Ideation (Recent)  Q1 Wished to be Dead (Past Month): yes  Q2 Suicidal Thoughts (Past Month): yes  Q3 Suicidal Thought Method: yes  Q4 Suicidal Intent without Specific Plan: yes  Q5 Suicide Intent with Specific Plan: yes  Within the Past 3 Months?: yes  Level of Risk per Screen: high risk  Intensity of Ideation (Recent)  Most Severe Ideation Rating (Past 1 Month): 4  Frequency (Past 1 Month): Daily or almost daily  Duration (Past 1 Month): Less than 1 hour/some of the time  Controllability (Past 1 Month): Unable to control thoughts  Deterrents (Past 1 Month): Deterrents definitely did not stop you  Reasons for Ideation (Past 1 Month): Completely to end or stop   the pain (You couldn't go on living with the pain or how you were   feeling)  Suicidal Behavior (Recent)  Actual Attempt (Past 3 Months): Yes  Total Number of Actual Attempts (Past 3 Months): 1  Actual Attempt Description (Past 3  Months): pill overdose  Has subject engaged in non-suicidal self-injurious behavior?   (Past 3 Months): No  Interrupted Attempts (Past 3 Months): No  Aborted or Self-Interrupted Attempt (Past 3 Months): Yes  Total Number of Aborted or Self-Interrupted Attempts (Past 3   Months): 1  Preparatory Acts or Behavior (Past 3 Months): Yes    Environmental or Psychosocial Events: helplessness/hopelessness,   impulsivity/recklessness, unemployment/underemployment, other   life stressors  Protective Factors: Protective Factors: lives in a responsibly   safe and stable environment    Does the patient have thoughts of harming others? Feels Like   Hurting Others: no  Previous Attempt to Hurt Others: no  Current presentation:  (n/a)  Is the patient engaging in sexually inappropriate behavior?: no    Is the patient engaging in sexually inappropriate behavior?  no          Mental Status Exam   Affect: Flat  Appearance: Disheveled  Attention Span/Concentration: Attentive  Eye Contact: Variable    Fund of Knowledge: Appropriate   Language /Speech Content: Fluent  Language /Speech Volume: Soft  Language /Speech Rate/Productions: Slow  Recent Memory: Intact  Remote Memory: Intact  Mood: Depressed, Sad  Orientation to Person: Yes   Orientation to Place: Yes  Orientation to Time of Day: Yes  Orientation to Date: Yes     Situation (Do they understand why they are here?): Yes  Psychomotor Behavior: Normal  Thought Content: Suicidal  Thought Form: Intact     Mini-Cog Assessment  Number of Words Recalled:    Clock-Drawing Test:     Three Item Recall:    Mini-Cog Total Score:       Medication  Psychotropic medications:   Medication Orders - Psychiatric (From admission, onward)      None             Current Care Team  Patient Care Team:  No Ref-Primary, Physician as PCP - General Heath Vasquez as Therapist  Nkechi Guzman as Psychiatrist  Miky (last name unkown) as     Diagnosis  Patient Active Problem List   Diagnosis Code    Prenatal  care in third trimester Z34.93    PTSD (post-traumatic stress disorder) F43.10    Depression F32.A    Housing lack Z59.00    Urinary tract infection in mother during third trimester of   pregnancy O23.43    Encounter for triage in pregnant patient Z36.89    Disassociation F48.8    Major depressive disorder, recurrent episode, moderate (H) F33.1         Primary Problem This Admission  Active Hospital Problems    Major depressive disorder, recurrent episode, moderate (H)      PTSD (post-traumatic stress disorder)        Clinical Summary and Substantiation of Recommendations   Pt attends the hospital after a suicide attempt with pills. Pt   lives alone and continues to endorse suicidal ideation with   intent. Pt denies HI, SIB, and psychosis symptoms. If pt returned   home there would be a high likelihood of recurrence. Pt is   recommended for inpatient mental health for medication management   and therapy service. Lower levels of care would not be adequate   in attending to current crisis nor attend to safety concerns. Pt   is not medically cleared at time of interview. Care providers   will need to call intake once pt is cleared. She is voluntary   through [facility] and requests [facility] placement.       Imminent risk of harm: Suicidal Behavior  Severe psychiatric, behavioral or other comorbid conditions are   appropriate for management at inpatient mental health as   indicated by at least one of the following: Psychiatric Symptoms  Severe dysfunction in daily living is present as indicated by at   least one of the following: Complete inability to maintain any   appropriate aspect of personal responsibility in any adult roles  Situation and expectations are appropriate for inpatient care:   Voluntary treatment at lower level of care is not feasible  Inpatient mental health services are necessary to meet patient   needs and at least one of the following: Specific condition   related to admission diagnosis is  present and judged likely to   further improve at proposed level of care, Specific condition   related to admission diagnosis is present and judged likely to   deteriorate in absence of treatment at proposed level of care      Patient coping skills attempted to reduce the crisis:  none    Disposition  Recommended disposition: Individual Therapy, Medication   Management, Inpatient Mental Health        Reviewed case and recommendations with attending provider.   Attending Name: Dr. Rich       Attending concurs with disposition: yes       Patient and/or validated legal guardian concurs with disposition:     yes       Final disposition:  inpatient mental health    Legal status on admission: Voluntary/Patient has signed consent   for treatment    Assessment Details   Total duration spent with the patient: 16 min     CPT code(s) utilized: Non-Billable    Christopher Wakefield Psychotherapist  DEC - Triage & Transition Services  Callback: [phone]          Lithium level     Status: Abnormal    Collection Time: 10/21/23 11:45 AM   Result Value Ref Range    Lithium 2.60 (HH) 0.60 - 1.20 mmol/L   Extra Tube     Status: None    Collection Time: 10/21/23 11:45 AM    Narrative    The following orders were created for panel order Extra Tube.  Procedure                               Abnormality         Status                     ---------                               -----------         ------                     Extra Green Top (Lithium...[[id number]]                      Final result                 Please view results for these tests on the individual orders.   Extra Green Top (Lithium Heparin) Tube     Status: None    Collection Time: 10/21/23 11:45 AM   Result Value Ref Range    Hold Specimen [facility]    Lithium level     Status: Abnormal    Collection Time: 10/21/23  1:59 PM   Result Value Ref Range    Lithium 2.01 (HH) 0.60 - 1.20 mmol/L   CBC with platelets, differential     Status: Abnormal    Collection Time: 10/21/23   8:10 PM    Narrative    The following orders were created for panel order CBC with platelets, differential.  Procedure                               Abnormality         Status                     ---------                               -----------         ------                     CBC with platelets and d...[[id number]]  Abnormal            Final result                 Please view results for these tests on the individual orders.   Comprehensive metabolic panel     Status: Abnormal    Collection Time: 10/21/23  8:10 PM   Result Value Ref Range    Sodium 141 135 - 145 mmol/L    Potassium 3.8 3.4 - 5.3 mmol/L    Carbon Dioxide (CO2) 23 22 - 29 mmol/L    Anion Gap 13 7 - 15 mmol/L    Urea Nitrogen 10.5 6.0 - 20.0 mg/dL    Creatinine 0.81 0.51 - 0.95 mg/dL    GFR Estimate >90 >60 mL/min/1.73m2    Calcium 9.8 8.6 - 10.0 mg/dL    Chloride 105 98 - 107 mmol/L    Glucose 112 (H) 70 - 99 mg/dL    Alkaline Phosphatase 63 35 - 104 U/L    AST 18 0 - 45 U/L    ALT 23 0 - 50 U/L    Protein Total 6.6 6.4 - 8.3 g/dL    Albumin 4.1 3.5 - 5.2 g/dL    Bilirubin Total <0.2 <=1.2 mg/dL   CBC with platelets and differential     Status: Abnormal    Collection Time: 10/21/23  8:10 PM   Result Value Ref Range    WBC Count 9.4 4.0 - 11.0 10e3/uL    RBC Count 4.29 3.80 - 5.20 10e6/uL    Hemoglobin 11.1 (L) 11.7 - 15.7 g/dL    Hematocrit 35.9 35.0 - 47.0 %    MCV 84 78 - 100 fL    MCH 25.9 (L) 26.5 - 33.0 pg    MCHC 30.9 (L) 31.5 - 36.5 g/dL    RDW 15.0 10.0 - 15.0 %    Platelet Count 228 150 - 450 10e3/uL    % Neutrophils 71 %    % Lymphocytes 20 %    % Monocytes 6 %    Mids % (Monos, Eos, Basos)      % Eosinophils 3 %    % Basophils 0 %    % Immature Granulocytes 0 %    NRBCs per 100 WBC 0 <1 /100    Absolute Neutrophils 6.6 1.6 - 8.3 10e3/uL    Absolute Lymphocytes 1.9 0.8 - 5.3 10e3/uL    Absolute Monocytes 0.5 0.0 - 1.3 10e3/uL    Mids Abs (Monos, Eos, Basos)      Absolute Eosinophils 0.3 0.0 - 0.7 10e3/uL    Absolute Basophils 0.0 0.0 -  0.2 10e3/uL    Absolute Immature Granulocytes 0.0 <=0.4 10e3/uL    Absolute NRBCs 0.0 10e3/uL         MD Cas Barth, [place] MD  10/22/23 0631

## 2023-10-22 NOTE — TELEPHONE ENCOUNTER
Pt too acute for beds available.    R: [place]  Access Inpatient Bed Call Log 10/21/23 @4:00 pm:    Intake has called facilities that have not updated the bed status within the last 12 hours.                                 [facility] is at capacity.               [facility] is posting 0 beds. [phone].  per call at 7:12 am to [place], they are at cap.    [facility] is posting 0 beds. Negative covid required.                  [facility] is posting 0 beds. Negative covid required. [phone] per call at 7:13 am to Theresa, she will ask her charge RN to call us back re: bed avail.    [facility] is posting 0 bed. [phone]    [facility] is posting 0 beds. [phone].     [facility] is posting 3 beds for Young Adults. Ages 18-26. Call for details. Negative covid.  [phone]; per call at 7:14 am to Maribell, they have some beds avail, number unk   [facility] is posting 0 beds            [facility] is posting 0 beds.         [facility] is posting 4 beds. LOW acuity ONLY. Mixed unit 12+. Negative covid- [phone].      [facility] has 0 beds posted. No aggression. Negative Covid. Low acuity.       [facility] is posting 0 beds. Negative covid. [phone]    [facility] is posting 1 bed. Low acuity only. Negative covid.  [phone].     [facility] is posting 0 beds. Low acuity. No current aggression.      [facility] ([place]) is posting 0 bed available. Negative covid.  [phone].        [facility] is posting 1 beds. Low acuity. 72 HH hold preferred. Negative covid required. [phone]. Per call at 7:19 am to [place], they are at cap for the day.    [facility] ([place]) is posting 3 beds. Low acuity only. Negative covid.  [phone].     [facility] [place] is posting 4 beds.  Negative covid required.   Vol only, No history of aggression,  violence, or assault. No sexual offenders. No 72  holds. [phone].           [facility] is posting 9 beds. Negative covid required.  (Must have the cognitive ability to do programming. No aggressive or violent behavior or recent HX in the last 2 yrs. MH must be primary.) Always low acuity. [phone] per call at 7:21 am to [facility], they have 9 beds avail.    [facility] has 0 beds posted. Negative covid required.  Low acuity only. Violence and aggression capped.  [phone]. Low acuity only.      [facility] is posting 3 beds. Low acuity, Negative covid required. [phone]. Per call at 7:24 am to [facility], they are at cap.    [facility] is posting 6 beds. Unit is a combined unit (14+). No aggressive patients. Voluntary only. Must be accompanied by a guardian.  Negative covid. [phone].  Can't accommodate suboxone or CD tx, low acuity. Per call at 7:26 am to [facility], they have 3-4 beds avail.    [facility] Negative covid required.  [phone]     [facility] is posting 0 bed. Negative covid. LOW acuity. (No lines, drains, or tubes, oxygen, CPAP, IV, etc.). [phone]. Per call @8:18am on 10/20/23, , no beds available until Monday.   [facility] is posting 18 beds. No covid test required.  [phone] per call at 7:28 am to [facility], they have 12 adult beds and 11 kid beds.    [facility] ([facility]) is posting 3 beds. Negative covid. (No. lines, drains, or tubes, oxygen, CPAP, IV, etc.). [phone]          Pt remains on the work list pending appropriate bed availability.

## 2023-10-23 ENCOUNTER — TELEPHONE (OUTPATIENT)
Dept: BEHAVIORAL HEALTH | Facility: CLINIC | Age: 38
End: 2023-10-23
Payer: COMMERCIAL

## 2023-10-23 VITALS
OXYGEN SATURATION: 97 % | SYSTOLIC BLOOD PRESSURE: 130 MMHG | RESPIRATION RATE: 16 BRPM | TEMPERATURE: 98.7 F | DIASTOLIC BLOOD PRESSURE: 75 MMHG | HEART RATE: 80 BPM

## 2023-10-23 PROCEDURE — 250N000013 HC RX MED GY IP 250 OP 250 PS 637: Performed by: FAMILY MEDICINE

## 2023-10-23 PROCEDURE — 250N000013 HC RX MED GY IP 250 OP 250 PS 637: Performed by: EMERGENCY MEDICINE

## 2023-10-23 RX ADMIN — SERTRALINE HYDROCHLORIDE 100 MG: 100 TABLET ORAL at 08:10

## 2023-10-23 RX ADMIN — LITHIUM CARBONATE 600 MG: 300 CAPSULE, GELATIN COATED ORAL at 08:10

## 2023-10-23 RX ADMIN — POLYETHYLENE GLYCOL 3350 17 G: 17 POWDER, FOR SOLUTION ORAL at 08:09

## 2023-10-23 RX ADMIN — METFORMIN HYDROCHLORIDE 500 MG: 500 TABLET, EXTENDED RELEASE ORAL at 08:10

## 2023-10-23 RX ADMIN — ARIPIPRAZOLE 2.5 MG: 5 TABLET ORAL at 08:10

## 2023-10-23 ASSESSMENT — ACTIVITIES OF DAILY LIVING (ADL)
ADLS_ACUITY_SCORE: 35

## 2023-10-23 NOTE — TELEPHONE ENCOUNTER
R: [place]  Access Inpatient Bed Call Log 10/22/23 @10:20 PM  [facility] +1 hr  Intake has called facilities that have not updated the bed status within the last 12 hours.                                 [facility] is at capacity.               [facility] is posting 0 beds. [phone].  per call at 7:15 am to [place], they are at cap.   [facility] is posting 0 beds. Negative covid required.   Per call with Ef[place], at capacity.  Try back at 10AM tomorrow               [facility] is posting 0 beds. Negative covid required. [phone]; per call at 10:20PM to [facility], facility is [facility] is posting 0 bed. [phone].  Per call with [place], at capacity.  Try back at 10AM tomorrow                 [facility] is posting 0 beds. [phone].  per call at 7:19 am to [facility], they are at cap.   [facility] is posting 3 beds for Young Adults. Ages 18-26. Call for details. Negative covid.  [phone]  [facility] is posting 0 beds.  Per call with [place], at capacity.  Try back at 10AM tomorrow                         [facility] is posting 0 beds.  Per call with [place], at capacity.  Try back at 10AM tomorrow                    [facility] is posting 4 bed. LOW acuity ONLY. Mixed unit 12+. Negative covid- [phone].    Per Deb, they are currently reviewing multiple pts but if they are able to review, they will call intake back.                         Pt remains on the work list pending appropriate bed availability.

## 2023-10-23 NOTE — ED PROVIDER NOTES
Emergency Department Psychiatric Patient Sign-out       Brief HPI:  This is a 38 year old female signed out to me by Dr. Phillips .  See initial ED Provider note for details of the presentation.     Patient is medically cleared for admission to a Behavioral Health unit.      Pending studies include none.      The patient is not on a hold.      The patient has not required medication for agitation.    Medications   lactated ringers infusion (0 mLs Intravenous Stopped 10/21/23 2008)   ARIPiprazole (ABILIFY) half-tab 2.5 mg (2.5 mg Oral $Given 10/22/23 0814)   atorvastatin (LIPITOR) tablet 20 mg (20 mg Oral $Given 10/22/23 2142)   hydrOXYzine (ATARAX) tablet 25-50 mg (has no administration in time range)   lithium capsule 600 mg (0 mg Oral Hold 10/22/23 2039)   metFORMIN (GLUCOPHAGE XR) 24 hr tablet 500 mg (500 mg Oral $Given 10/22/23 0814)   sertraline (ZOLOFT) tablet 100 mg (100 mg Oral $Given 10/22/23 0814)   traZODone (DESYREL) tablet 150 mg (150 mg Oral $Given 10/22/23 2142)   polyethylene glycol (MIRALAX) Packet 17 g (17 g Oral $Given 10/22/23 0838)   melatonin tablet 3 mg (3 mg Oral $Given 10/22/23 2204)   sodium chloride 0.9% BOLUS 1,000 mL (0 mLs Intravenous Stopped 10/21/23 0404)   ondansetron (ZOFRAN) injection 4 mg (4 mg Intravenous $Given 10/21/23 0643)   melatonin tablet 3 mg (3 mg Oral $Given 10/21/23 2302)       Exam:   Patient Vitals for the past 24 hrs:   BP Temp Temp src Pulse Resp SpO2   10/23/23 0336 132/82 98.7  F (37.1  C) Oral 80 18 96 %   10/22/23 0816 114/77 98.1  F (36.7  C) Oral 75 20 98 %         ED Course:    4:33 AM: Received a bed at [place].  Dr. Lutz accepting      Impression:    ICD-10-CM    1. Intentional drug overdose, initial encounter (H)  T50.902A       2. Depression with suicidal ideation  F32.A     R45.851           Plan:    1. Await Transfer to Mental Health Facility      RESULTS:   Results for orders placed or performed during the hospital encounter of 10/21/23 (from  the past 24 hour(s))   Lithium level     Status: Normal    Collection Time: 10/22/23  6:52 PM   Result Value Ref Range    Lithium 0.65 0.60 - 1.20 mmol/L         MD Cas Barth Christopher James, MD  10/23/23 3205

## 2023-10-23 NOTE — ED PROVIDER NOTES
History     Chief Complaint   Patient presents with    Drug Overdose     HPI  Sveta Cuellar is a 38 year old female who has been boarded in the ER after presenting with a deliberate medication overdose.      I have followed this patient after signout from Dr. Desai.    Patient explains that she has taken lithium as a mood stabilizer.  This was one of the meds she overdosed on.  Levels have been monitored.  Lithium level has been elevated in the last day although when checked today was 0.65 which is in the low therapeutic range.  We did not elect to restart this med at this time.    I did receive a call from [facility] and there is no bed available at this time for transfer for admission.    Allergies:  Allergies   Allergen Reactions    Wellbutrin [Bupropion]      Nausea and dizziness       Problem List:    Patient Active Problem List    Diagnosis Date Noted    Major depressive disorder, recurrent episode, moderate (H) 10/21/2023     Priority: Medium    Disassociation 12/05/2019     Priority: Medium     Associated with PTSD symptoms      Encounter for triage in pregnant patient 10/31/2019     Priority: Medium    Urinary tract infection in mother during third trimester of pregnancy 10/13/2019     Priority: Medium    Housing lack 09/24/2019     Priority: Medium    Prenatal care in third trimester 09/20/2019     Priority: Medium     [facility] at 29 weeks   All labs including GCT done at [facility]    9/24/19 patient living in crisis shelter, needs to be out this week.   Care coordination referral.   Growth U/S 10/18/19: Cephalic fetus.  EFW 5 pounds 4 ounces, 2369 g, 63rd growth percentile.  RAMESH normal, 12.3.      PTSD (post-traumatic stress disorder) 09/20/2019     Priority: Medium     Self reported.  Has services       Depression 09/20/2019     Priority: Medium     Self reported, has services   9/24/19 pt states on meds?  Possible bipolar diagnosis?          Past Medical History:    Past Medical  History:   Diagnosis Date    Depression        Past Surgical History:    Past Surgical History:   Procedure Laterality Date    GI SURGERY         Family History:    No family history on file.    Social History:  Marital Status:  Single [1]  Social History     Tobacco Use    Smoking status: Never    Smokeless tobacco: Never   Substance Use Topics    Alcohol use: Not Currently    Drug use: Never        Medications:    ARIPiprazole (ABILIFY) 5 MG tablet  atorvastatin (LIPITOR) 20 MG tablet  hydrOXYzine (ATARAX) 25 MG tablet  hydrOXYzine (ATARAX) 50 MG tablet  lithium (ESKALITH) 600 MG capsule  Melatonin 3 MG TBDP  metFORMIN (GLUCOPHAGE XR) 500 MG 24 hr tablet  sertraline (ZOLOFT) 100 MG tablet  traZODone (DESYREL) 150 MG tablet          Review of Systems    Physical Exam   BP: (!) 148/74  Pulse: 83  Temp: 97.9  F (36.6  C)  Resp: 18  SpO2: 96 %      Physical Exam    Patient is alert, conversant.  Sitting on her bed.  No distress.  No labored breathing.  No lethargy or obvious withdrawal.      ED Course     Mental Health Risk Assessment        PSS-3      Date and Time Over the past 2 weeks have you felt down, depressed, or hopeless? Over the past 2 weeks have you had thoughts of killing yourself? Have you ever attempted to kill yourself? When did this last happen? User   10/21/23 0156 yes yes yes within the last 24 hours (including today) DJC          C-SSRS ([place])      Date and Time Q1 Wished to be Dead (Past Month) Q2 Suicidal Thoughts (Past Month) Q3 Suicidal Thought Method Q4 Suicidal Intent without Specific Plan Q5 Suicide Intent with Specific Plan Q6 Suicide Behavior (Lifetime) Within the Past 3 Months? RETIRED: Level of Risk per Screen Screening Not Complete User   10/21/23 0902 yes yes yes yes yes -- -- -- -- WEC   10/21/23 0807 yes yes yes no yes yes -- -- -- KF   10/21/23 0156 yes yes yes yes yes -- -- -- -- DJC                       Procedures             Critical Care time:               Results for  orders placed or performed during the hospital encounter of 10/21/23 (from the past 24 hour(s))   Lithium level   Result Value Ref Range    Lithium 0.65 0.60 - 1.20 mmol/L       Medications   lactated ringers infusion (0 mLs Intravenous Stopped 10/21/23 2008)   ARIPiprazole (ABILIFY) half-tab 2.5 mg (2.5 mg Oral $Given 10/22/23 0814)   atorvastatin (LIPITOR) tablet 20 mg (20 mg Oral $Given 10/21/23 2253)   hydrOXYzine (ATARAX) tablet 25-50 mg (has no administration in time range)   lithium capsule 600 mg (0 mg Oral Hold 10/22/23 2039)   metFORMIN (GLUCOPHAGE XR) 24 hr tablet 500 mg (500 mg Oral $Given 10/22/23 0814)   sertraline (ZOLOFT) tablet 100 mg (100 mg Oral $Given 10/22/23 0814)   traZODone (DESYREL) tablet 150 mg (150 mg Oral $Given 10/21/23 2253)   polyethylene glycol (MIRALAX) Packet 17 g (17 g Oral $Given 10/22/23 0838)   sodium chloride 0.9% BOLUS 1,000 mL (0 mLs Intravenous Stopped 10/21/23 0404)   ondansetron (ZOFRAN) injection 4 mg (4 mg Intravenous $Given 10/21/23 0643)   melatonin tablet 3 mg (3 mg Oral $Given 10/21/23 2302)       Assessments & Plan (with Medical Decision Making)       Patient with history of depression and presentation to ER after intentional drug overdose.  Has continued depression with SI.  Lithium level improved.    At this time waiting on placement for admission.      I have reviewed the nursing notes.    I have reviewed the findings, diagnosis, plan and need for follow up with the patient.          Medical Decision Making  The patient's presentation was of .    The patient's evaluation involved:      The patient's management necessitated .        New Prescriptions    No medications on file       Final diagnoses:   Intentional drug overdose, initial encounter (H)   Depression with suicidal ideation       10/21/2023   [facility] EMERGENCY DEPT       Fernandez Phillips MD  10/22/23 8561

## 2023-10-23 NOTE — TELEPHONE ENCOUNTER
No appropriate beds are currently available within the  system. Bed search update (per EC note, pt willing to go statewide) @ 12:20AM        [facility]: @ cap per website. Per Bethany @ 00:20, they are full   [facility]: @ cap per website   [facility]: @ cap per website. Per Angela @ 00:21, they are full    [facility]: @ cap per website   [facility]: @ cap per website   [facility]: Posting 3 beds (Young Adult unit: No recent aggressions, violence or sexual assault. No psychosis. Neg covid required. Vol only). Pt not appropriate d/t facility restrictions    [facility]: @ cap per website   [facility]: @ cap per website   [facility]: @ cap per website   [facility]: Posting 4 beds. Mixed unit/Low acuity only. Per Deb @ 00:42 they are reviewing many referrals still but PPS can check back in a few hours  [facility]: @ cap per website   [facility]: @ cap per website   [facility]: @ cap per website   [facility]: Does not review after 10PM.     [facility]: @ cap for all 3 locations per website  [facility]: Posting 3 beds (No hx of aggression. No sexual offenders. Voluntary patients only). Per call @ 00:44 they are able to review. Faxed clinical @ 01:04  [facility]: Posting 8 beds (Always low acuity only. Must have the cognitive ability to do programming. No aggressive or violent behavior or recent HX in the last 2 yrs. MH must be primary).   [facility]: @ cap per website   [facility]: Posting 3 beds (Low acuity, Neg Covid). Per call @ 00:19 they are full [facility]: Posting 6 beds (Covid neg. Voluntary only. Mixed unit. No aggressive or violent behavior. No registered sex offenders).   [facility]: Posting 1 bed (No hx of aggression/assault. No lines, drains or tubes. Does not provide detox or CD treatment).   [facility]: Posting 4 beds. Pt requests statewide at this time - Pt is  voluntary [facility]: Posting 3 beds (Mixed unit/Low acuity).          Pt remains on work list until appropriate placement is available     Awaiting callback from Izzy Jamison from [facility] reports @ 04:19 that pt was accepted by Dr. Lutz. RN-RN report whenever but would also like another call once pt is en route from the ED to #: [phone]    Notified ED of placement @ 04:21    R: Izzy Godfrey in [facility] / Dr. Lutz

## 2023-11-14 ENCOUNTER — HOSPITAL ENCOUNTER (EMERGENCY)
Facility: CLINIC | Age: 38
Discharge: PSYCHIATRIC OR SUBSTANCE USE FACILITY | End: 2023-11-16
Attending: EMERGENCY MEDICINE | Admitting: EMERGENCY MEDICINE
Payer: COMMERCIAL

## 2023-11-14 DIAGNOSIS — R45.851 SUICIDAL IDEATION: ICD-10-CM

## 2023-11-14 PROCEDURE — 99285 EMERGENCY DEPT VISIT HI MDM: CPT | Performed by: EMERGENCY MEDICINE

## 2023-11-14 PROCEDURE — 99285 EMERGENCY DEPT VISIT HI MDM: CPT | Mod: 25 | Performed by: EMERGENCY MEDICINE

## 2023-11-14 RX ORDER — DOXEPIN HYDROCHLORIDE 10 MG/1
10 CAPSULE ORAL AT BEDTIME
Status: DISCONTINUED | OUTPATIENT
Start: 2023-11-14 | End: 2023-11-16 | Stop reason: HOSPADM

## 2023-11-14 RX ORDER — DULOXETIN HYDROCHLORIDE 30 MG/1
30 CAPSULE, DELAYED RELEASE ORAL DAILY
Status: DISCONTINUED | OUTPATIENT
Start: 2023-11-15 | End: 2023-11-16 | Stop reason: HOSPADM

## 2023-11-14 RX ORDER — RISPERIDONE 0.25 MG/1
0.5 TABLET ORAL AT BEDTIME
Status: DISCONTINUED | OUTPATIENT
Start: 2023-11-14 | End: 2023-11-16 | Stop reason: HOSPADM

## 2023-11-14 RX ORDER — HYDROXYZINE HYDROCHLORIDE 25 MG/1
25 TABLET, FILM COATED ORAL 3 TIMES DAILY PRN
Status: DISCONTINUED | OUTPATIENT
Start: 2023-11-14 | End: 2023-11-16 | Stop reason: HOSPADM

## 2023-11-14 ASSESSMENT — ACTIVITIES OF DAILY LIVING (ADL): ADLS_ACUITY_SCORE: 33

## 2023-11-15 ENCOUNTER — TELEPHONE (OUTPATIENT)
Dept: BEHAVIORAL HEALTH | Facility: CLINIC | Age: 38
End: 2023-11-15
Payer: COMMERCIAL

## 2023-11-15 ENCOUNTER — TELEPHONE (OUTPATIENT)
Dept: BEHAVIORAL HEALTH | Facility: CLINIC | Age: 38
End: 2023-11-15

## 2023-11-15 LAB
AMPHETAMINES UR QL SCN: NORMAL
BARBITURATES UR QL SCN: NORMAL
BENZODIAZ UR QL SCN: NORMAL
BZE UR QL SCN: NORMAL
CANNABINOIDS UR QL SCN: NORMAL
FENTANYL UR QL: NORMAL
FLUAV RNA SPEC QL NAA+PROBE: NEGATIVE
FLUBV RNA RESP QL NAA+PROBE: NEGATIVE
HCG UR QL: NEGATIVE
OPIATES UR QL SCN: NORMAL
PCP QUAL URINE (ROCHE): NORMAL
RSV RNA SPEC NAA+PROBE: NEGATIVE
SARS-COV-2 RNA RESP QL NAA+PROBE: NEGATIVE

## 2023-11-15 PROCEDURE — 250N000013 HC RX MED GY IP 250 OP 250 PS 637: Performed by: EMERGENCY MEDICINE

## 2023-11-15 PROCEDURE — 81025 URINE PREGNANCY TEST: CPT | Performed by: EMERGENCY MEDICINE

## 2023-11-15 PROCEDURE — 87637 SARSCOV2&INF A&B&RSV AMP PRB: CPT | Performed by: EMERGENCY MEDICINE

## 2023-11-15 PROCEDURE — 80307 DRUG TEST PRSMV CHEM ANLYZR: CPT | Performed by: EMERGENCY MEDICINE

## 2023-11-15 RX ORDER — DOXEPIN HYDROCHLORIDE 10 MG/1
1 CAPSULE ORAL AT BEDTIME
Status: ON HOLD | COMMUNITY
Start: 2023-10-29 | End: 2023-11-29

## 2023-11-15 RX ORDER — RISPERIDONE 0.5 MG/1
0.5 TABLET, ORALLY DISINTEGRATING ORAL DAILY
Status: ON HOLD | COMMUNITY
Start: 2023-11-09 | End: 2023-11-29

## 2023-11-15 RX ORDER — DULOXETIN HYDROCHLORIDE 30 MG/1
30 CAPSULE, DELAYED RELEASE ORAL DAILY
Status: ON HOLD | COMMUNITY
Start: 2023-10-30 | End: 2023-11-29

## 2023-11-15 RX ORDER — OLANZAPINE 5 MG/1
10 TABLET, ORALLY DISINTEGRATING ORAL 2 TIMES DAILY PRN
Status: DISCONTINUED | OUTPATIENT
Start: 2023-11-15 | End: 2023-11-16 | Stop reason: HOSPADM

## 2023-11-15 RX ORDER — ACETAMINOPHEN 325 MG/1
650 TABLET ORAL EVERY 4 HOURS PRN
Status: DISCONTINUED | OUTPATIENT
Start: 2023-11-15 | End: 2023-11-16 | Stop reason: HOSPADM

## 2023-11-15 RX ADMIN — DULOXETINE HYDROCHLORIDE 30 MG: 30 CAPSULE, DELAYED RELEASE ORAL at 11:11

## 2023-11-15 RX ADMIN — OLANZAPINE 10 MG: 5 TABLET, ORALLY DISINTEGRATING ORAL at 13:57

## 2023-11-15 RX ADMIN — DOXEPIN HYDROCHLORIDE 10 MG: 10 CAPSULE ORAL at 00:17

## 2023-11-15 RX ADMIN — HYDROXYZINE HYDROCHLORIDE 25 MG: 25 TABLET, FILM COATED ORAL at 01:38

## 2023-11-15 RX ADMIN — Medication 3 MG: at 01:38

## 2023-11-15 RX ADMIN — RISPERIDONE 0.5 MG: 0.25 TABLET, FILM COATED ORAL at 00:17

## 2023-11-15 RX ADMIN — IBUPROFEN 600 MG: 400 TABLET ORAL at 00:21

## 2023-11-15 ASSESSMENT — ACTIVITIES OF DAILY LIVING (ADL)
ADLS_ACUITY_SCORE: 35

## 2023-11-15 ASSESSMENT — COLUMBIA-SUICIDE SEVERITY RATING SCALE - C-SSRS
TOTAL  NUMBER OF INTERRUPTED ATTEMPTS SINCE LAST CONTACT: NO
TOTAL  NUMBER OF ABORTED OR SELF INTERRUPTED ATTEMPTS SINCE LAST CONTACT: NO
2. HAVE YOU ACTUALLY HAD ANY THOUGHTS OF KILLING YOURSELF?: YES
5. HAVE YOU STARTED TO WORK OUT OR WORKED OUT THE DETAILS OF HOW TO KILL YOURSELF? DO YOU INTEND TO CARRY OUT THIS PLAN?: NO
6. HAVE YOU EVER DONE ANYTHING, STARTED TO DO ANYTHING, OR PREPARED TO DO ANYTHING TO END YOUR LIFE?: NO
ATTEMPT SINCE LAST CONTACT: NO
REASONS FOR IDEATION SINCE LAST CONTACT: COMPLETELY TO END OR STOP THE PAIN (YOU COULDN'T GO ON LIVING WITH THE PAIN OR HOW YOU WERE FEELING)
1. SINCE LAST CONTACT, HAVE YOU WISHED YOU WERE DEAD OR WISHED YOU COULD GO TO SLEEP AND NOT WAKE UP?: YES

## 2023-11-15 NOTE — ED TRIAGE NOTES
Pt presents with suicidal thoughts. Pt states she has no specific plan at this time, but pt did attempt to end her life 2 weeks ago by way of overdose and was hospitalized for 1 week.       Triage Assessment (Adult)       Row Name 11/14/23 5075          Triage Assessment    Airway WDL WDL        Respiratory WDL    Respiratory WDL WDL        Skin Circulation/Temperature WDL    Skin Circulation/Temperature WDL WDL        Cardiac WDL    Cardiac WDL WDL        Peripheral/Neurovascular WDL    Peripheral Neurovascular WDL WDL     Capillary Refill, General less than/equal to 3 secs        Cognitive/Neuro/Behavioral WDL    Cognitive/Neuro/Behavioral WDL WDL        [place] Coma Scale    Best Eye Response 4-->(E4) spontaneous     Best Motor Response 6-->(M6) obeys commands     Best Verbal Response 5-->(V5) oriented     [place] Coma Scale Score 15

## 2023-11-15 NOTE — ED PROVIDER NOTES
History     Chief Complaint   Patient presents with    Suicidal     HPI  Sveta Cuellar is a 38 year old female who presents for suicidal ideation.  The patient says that she was upset this evening, drinking alcohol, felt alone, she felt like her significant other was not taking the relationship seriously.  She had increasing thoughts of suicide and so asked neighbor to bring her here.  No plan of suicide.  She says she would like help.  She says she does not feel safe going home.  She was admitted to the hospital after suicide attempt 2 weeks ago, was discharged from the hospital on new medications 1 week ago.  No fevers, chest pain, difficulty breathing, abdominal pain nausea, vomiting.    Allergies:  Allergies   Allergen Reactions    Wellbutrin [Bupropion]      Nausea and dizziness       Problem List:    Patient Active Problem List    Diagnosis Date Noted    Borderline personality disorder (H) 11/15/2023     Priority: Medium    Major depressive disorder, recurrent episode, moderate (H) 10/21/2023     Priority: Medium    Disassociation 12/05/2019     Priority: Medium     Associated with PTSD symptoms      Encounter for triage in pregnant patient 10/31/2019     Priority: Medium    Urinary tract infection in mother during third trimester of pregnancy 10/13/2019     Priority: Medium    Housing lack 09/24/2019     Priority: Medium    Prenatal care in third trimester 09/20/2019     Priority: Medium     CESAR from [facility] at 29 weeks   All labs including GCT done at [facility]    9/24/19 patient living in crisis shelter, needs to be out this week.   Care coordination referral.   Growth U/S 10/18/19: Cephalic fetus.  EFW 5 pounds 4 ounces, 2369 g, 63rd growth percentile.  RAMESH normal, 12.3.      PTSD (post-traumatic stress disorder) 09/20/2019     Priority: Medium     Self reported.  Has services       Depression 09/20/2019     Priority: Medium     Self reported, has services   9/24/19 pt states on meds?  Possible bipolar  diagnosis?          Past Medical History:    Past Medical History:   Diagnosis Date    Depression        Past Surgical History:    Past Surgical History:   Procedure Laterality Date    GI SURGERY         Family History:    No family history on file.    Social History:  Marital Status:  Single [1]  Social History     Tobacco Use    Smoking status: Never    Smokeless tobacco: Never   Substance Use Topics    Alcohol use: Not Currently    Drug use: Never        Medications:    ARIPiprazole (ABILIFY) 5 MG tablet  atorvastatin (LIPITOR) 20 MG tablet  hydrOXYzine (ATARAX) 25 MG tablet  hydrOXYzine (ATARAX) 50 MG tablet  lithium (ESKALITH) 600 MG capsule  Melatonin 3 MG TBDP  metFORMIN (GLUCOPHAGE XR) 500 MG 24 hr tablet  sertraline (ZOLOFT) 100 MG tablet  traZODone (DESYREL) 150 MG tablet          Review of Systems    Physical Exam   BP: (!) 145/99  Pulse: 101  Temp: 99.2  F (37.3  C)  Resp: 18  Height: 152.4 cm (5')  Weight: 104.3 kg (230 lb)  SpO2: 95 %      Physical Exam  Constitutional:       General: She is not in acute distress.     Appearance: Normal appearance. She is well-developed.   HENT:      Head: Normocephalic and atraumatic.   Eyes:      General: No scleral icterus.     Conjunctiva/sclera: Conjunctivae normal.   Cardiovascular:      Rate and Rhythm: Normal rate.   Pulmonary:      Effort: Pulmonary effort is normal. No respiratory distress.   Abdominal:      General: Abdomen is flat.   Musculoskeletal:      Cervical back: Normal range of motion and neck supple.   Skin:     General: Skin is warm and dry.      Findings: No rash.   Neurological:      Mental Status: She is alert and oriented to person, place, and time.   Psychiatric:         Thought Content: Thought content includes suicidal ideation. Thought content does not include suicidal plan.         ED Course     Mental Health Risk Assessment        PSS-3      Date and Time Over the past 2 weeks have you felt down, depressed, or hopeless? Over the past 2  weeks have you had thoughts of killing yourself? Have you ever attempted to kill yourself? When did this last happen? User   11/14/23 2140 yes yes yes within the last month (but not today) HANK          C-SSRS ([place])      Date and Time Q1 Wished to be Dead (Past Month) Q2 Suicidal Thoughts (Past Month) Q3 Suicidal Thought Method Q4 Suicidal Intent without Specific Plan Q5 Suicide Intent with Specific Plan Q6 Suicide Behavior (Lifetime) Within the Past 3 Months? RETIRED: Level of Risk per Screen Screening Not Complete User   11/15/23 0032 -- -- -- -- -- yes -- -- -- ANNIE   11/14/23 2140 -- -- -- -- no -- -- -- -- HANK   11/14/23 2140 -- -- no no -- -- -- -- --                 Suicide assessment completed by mental health (D.E.C., LCSW, etc.)       Procedures              Critical Care time:  none               No results found for this or any previous visit (from the past 24 hour(s)).    Medications   DULoxetine (CYMBALTA) DR capsule 30 mg (has no administration in time range)   doxepin (SINEquan) capsule 10 mg (10 mg Oral $Given 11/15/23 0017)   hydrOXYzine (ATARAX) tablet 25 mg (25 mg Oral $Given 11/15/23 0138)   risperiDONE (risperDAL) tablet 0.5 mg (0.5 mg Oral $Given 11/15/23 0017)   acetaminophen (TYLENOL) tablet 650 mg (has no administration in time range)   OLANZapine zydis (zyPREXA) ODT tab 10 mg (has no administration in time range)   melatonin tablet 3 mg (3 mg Oral $Given 11/15/23 0138)   ibuprofen (ADVIL/MOTRIN) tablet 600 mg (600 mg Oral $Given 11/15/23 0021)       Assessments & Plan (with Medical Decision Making)   38-year-old female with a history of prior suicide attempt who presents for evaluation of suicidal ideation this evening.  No plan of suicide.  She is calm and cooperative here.  The patient is evaluated by DEC, I spoke on the phone with the DEC , we discussed ongoing management the patient.  At this time I do not believe that the patient requires hospital admission for suicidal  ideation and the DEC  agrees with this assessment.  We will plan on letting the patient sleep here overnight, sober and then reassess in the morning.    The patient is signed out to Dr. Jennings at change of shift.    I have reviewed the nursing notes.    I have reviewed the findings, diagnosis, plan and need for follow up with the patient.           New Prescriptions    No medications on file       Final diagnoses:   Suicidal ideation       11/14/2023   [facility] EMERGENCY DEPT       Reuben Powell MD  11/15/23 0559

## 2023-11-15 NOTE — PROGRESS NOTES
"Triage and Transition Services Extended Care Reassessment     Patient: Sveta goes by \"Sveta,\" uses she/her pronouns  Date of Service: November 15, 2023  Site of Service: [facility] EMERGENCY DEPT                             ED05  Patient was seen yes    Reason for Reassessment: other (see comment) (Observation Status)    History of Patient's Original Emergency Room Encounter: Pt says she found out today that a significant other does not take relationship as seriously as she and some additional upsetting information.  She called a friend and decided she needed to come to ED as she had suicidal thoughts.Pt reports no plan and that she thinks she needs an IRTS as living alone doesn't seem to allow the appropriate level of care patient needs to stay emotionally regulated and safe.  Pt has PMH dx to include MDD BPD and PTSD.   Pt has hx of housing instabilty but reports she has had her apartment since January of this year.   Pt's therapist recommended pt think about IRTS level of care and pt agrees she needs a higher level of care going forward.   As noted above, pt states she does not feel safe to leave ED at this time.  Since she is forward thinking, has no plan and did consume alcohol, patient will be administered her meds, allowed to sleep in ED and reassessed for discharge option in the morning.    Current Patient Presentation: Patient presents and hopeless and tearful throughout the interaction. She endorses ongoing SI although has not formulated a plan at this time. She rates her SI at 9/10. Shakeel does not feel safe to return home, and does not feel safe to return home as she does not feel she has enough supports in place due to a recent loss of a relationship due to cheating. Patient is able to identify some DBT Skills however identifies difficulty using these when she is overwhelmed. Throughout the interaction shakeel does appear depressed and  somewhat sullen throughout the " interaction.    Changes Observed Since Initial Assessment: increase in presenting symptoms    Therapeutic Interventions Provided: Engaged in guided discovery, explored patient's perspectives and helped expand them through socratic dialogue., Provided positive reinforcement for progress towards goals, gains in knowledge, and application of skills previously taught., Discussed TIP (body temperature, intense exercise, PMR).    Current Symptoms: racing thoughts, excessive worry, anxious avoidance, withdrawl/isolation, crying or feels like crying, apathy           Mental Status Exam   Affect: Blunted  Appearance: Disheveled  Attention Span/Concentration: Attentive  Eye Contact: Variable    Fund of Knowledge: Appropriate   Language /Speech Content: Fluent  Language /Speech Volume: Soft  Language /Speech Rate/Productions: Slow, Minimally Responsive  Recent Memory: Intact  Remote Memory: Intact  Mood: Depressed, Sad  Orientation to Person: Yes   Orientation to Place: Yes  Orientation to Time of Day: Yes  Orientation to Date: Yes     Situation (Do they understand why they are here?): Yes  Psychomotor Behavior: Normal  Thought Content: Suicidal  Thought Form: Intact    Treatment Objective(s) Addressed: rapport building, orienting the patient to therapy, identifying and practicing coping strategies    Patient Response to Interventions: verbalizes understanding    Progress Towards Goals:  Patient Reports Symptoms Are: worsening  Patient Progress Toward Goals: is making progress  Next Step to Work Toward Discharge: symptom stabilization, patient ability to engage in safety planning, engaging in safety planning with collateral sources  System Stabilization Comment: Stabilization of suicidal thoughts  Ability to Engage Comment: Needing additional supports in place to help mitigate symptoms in the community  Ability to Engage in Safety Plan: Ability to engage in additional saferty plan, and means reduction planning.    Case  Management:    Case Allen Bolaños- [facility] Suicide Severity Rating Scale Since Last Contact: 11/15/23  Suicidal Ideation (Since Last Contact)  1. Wish to be Dead (Since Last Contact): Yes  Wish to be Dead Description (Since Last Contact): thoughts of SI  2. Non-Specific Active Suicidal Thoughts (Since Last Contact): Yes  3. Active Suicidal Ideation with any Methods (Not Plan) Without Intent to Act (Since Last Contact): No  4. Active Suicidal Ideation with Some Intent to Act, Without Specific Plan (Since Last Contact): Yes  Active Suicidal Ideation with Some Intent to Act, Without Specific Plan Description (Since Last Contact): wanted to end her life due to loss of a relationship  5. Active Suicidal Ideation with Specific Plan and Intent (Since Last Contact): No  Suicidal Behavior (Since Last Contact)  Actual Attempt (Since Last Contact): No  Interrupted Attempts (Since Last Contact): No  Aborted or Self-Interrupted Attempt (Since Last Contact): No  Preparatory Acts or Behavior (Since Last Contact): No     C-SSRS Risk (Since Last Contact)  Calculated C-SSRS Risk Score (Since Last Contact): High Risk    Plan: Final Disposition / Recommended Care Path: inpatient mental health  Plan for Care reviewed with assigned Medical Provider: yes  Plan for Care Team Review: provider  Patient and/or validated legal guardian concurs: yes  Clinical Substantiation: While patient does not currently have a plan for suicide, it does appear that without additional supports she would have increased thoughts of suicide that she is currently unable to manage on her own. Patient additionally has not been taking medications due to not having access; however recently had an overdose, and her medications will likely arrive soon giving her access to her most recent method of suicide. Patient does not feel safe, and is unable to engage in safety planning at this time.    Legal Status: Legal Status at Admission:  Voluntary/Patient has signed consent for treatment    Session Status: Time session started: 1058  Time session ended: 1116  Session Duration (minutes): 16 minutes  Session Number: 1  Anticipated number of sessions or this episode of care: 4  Mode: Virtual      CPT code(s) utilized: 32359 - Psychotherapy (with patient) - 30 (16-37*) min    Diagnosis:   Patient Active Problem List   Diagnosis Code    Prenatal care in third trimester Z34.93    PTSD (post-traumatic stress disorder) F43.10    Depression F32.A    Housing lack Z59.00    Urinary tract infection in mother during third trimester of pregnancy O23.43    Encounter for triage in pregnant patient Z36.89    Disassociation F48.8    Major depressive disorder, recurrent episode, moderate (H) F33.1    Borderline personality disorder (H) F60.3       Shashank Austin   Licensed Mental Health Professional (LMHP), [facility]  [phone]

## 2023-11-15 NOTE — ED NOTES
Patient would like her neighbor (Zach Miller) updated on her destination once she is being transferred. His phone number is [phone].

## 2023-11-15 NOTE — ED NOTES
Pt is very cooperative with cares, voluntary and has good insight into the admission process. Pt changed into behavioral scrubs, clothing and belongings labeled and stored in cabinet locker.

## 2023-11-15 NOTE — MEDICATION SCRIBE - ADMISSION MEDICATION HISTORY
Medication Scribe Admission Medication History    Admission medication history is complete. The information provided in this note is only as accurate as the sources available at the time of the update.    Information Source(s): Patient via in-person    Pertinent Information: Patient has discontinued some [employer] meds    Changes made to PTA medication list:  Added: None  Deleted: Abilify 5 mg, Lithium 600 mg, Sertraline 100 mg, and Trazodone 150 mg  Changed: None    Medication Affordability:  Not including over the counter (OTC) medications, was there a time in the past 3 months when you did not take your medications as prescribed because of cost?: No    Allergies reviewed with patient and updates made in EHR: yes    Medication History Completed By: Perla Ceja 11/15/2023 10:01 AM    PTA Med List   Medication Sig Last Dose    atorvastatin (LIPITOR) 20 MG tablet Take 1 tablet by mouth at bedtime Past Month    doxepin (SINEQUAN) 10 MG capsule Take 1 capsule by mouth at bedtime 11/14/2023 at hs    DULoxetine (CYMBALTA) 30 MG capsule Take 30 mg by mouth daily Past Week    hydrOXYzine (ATARAX) 25 MG tablet Take 1-2 tablets (25-50 mg) by mouth every 6 hours as needed for anxiety Unknown at prn    hydrOXYzine (ATARAX) 50 MG tablet Take 50 mg by mouth 3 times daily as needed Unknown at prn    Melatonin 3 MG TBDP Take 1 tablet by mouth at bedtime 11/14/2023 at hs    metFORMIN (GLUCOPHAGE XR) 500 MG 24 hr tablet Take 1 tablet by mouth daily 11/14/2023 at pm    risperiDONE (RISPERDAL M-TABS) 0.5 MG ODT Place 0.5 mg under the tongue daily 11/14/2023 at pm

## 2023-11-15 NOTE — TELEPHONE ENCOUNTER
S: [facility] ED , DEC  [place]  calling at 10:51 AM about a 38 year old/Female presenting with SI     B: Pt arrived via Friend. Presenting problem, stressors: Pt recently had a significant other cheat on her which lead to her SI. Pt reporting struggling with SI for weeks feeling lack of support and isolation.    Pt affect in ED: Blunted and Tearful  Pt Dx: Major Depressive Disorder, PTSD, and Borderline Personality Disorder  Previous [facility] hx? Yes: October 2023  Pt endorses SI, no plan   Hx of suicide attempt? Yes: Unknown  Pt denies SIB  Pt denies HI -thoughts of revenge on significant other.  Pt denies hallucinations .   Pt RARS Score: 2    Hx of aggression/violence, sexual offenses, legal concerns, Epic care plan? describe: None  Current concerns for aggression this visit? No  Does pt have a history of Civil Commitment? No  Is Pt their own guardian? Yes    Pt is prescribed medication. Is patient medication compliant? Yes  Pt endorses OP services: Psychiatrist, Therapist, and   CD concerns: None  Acute or chronic medical concerns: None noted.  Does Pt present with specific needs, assistive devices, or exclusionary criteria? None      Pt is ambulatory  Pt is able to perform ADLs independently      A: Pt to be reviewed for [facility] admission. Pt is Voluntary  Preferred placement: [facility]    COVID Symptoms: No  If yes, COVID test required   Utox: Not ordered, intake to request lab    CMP: N/A  CBC: N/A  HCG: Not ordered, intake to request lab     R: Patient cleared and ready for behavioral bed placement: Yes  Pt placed on IP worklist? Yes    Does Patient need a Transfer Center request created? Yes, writer completed Transfer Center request at:  11:01 AM

## 2023-11-15 NOTE — ED NOTES
Pt now awake. She is sad/tearful d/t continued suicidal ideation. She denies any plans at this time. Pt is calm and cooperative and in agreement with current plan for inpatient placement. Pt given 1 pen and notepad for distraction techniques (per her request) (cap removed from room).

## 2023-11-15 NOTE — ED PROVIDER NOTES
Emergency Department Psychiatric Patient Sign-out       Brief HPI:  This is a 38 year old female signed out to me by Dr. Jennings .  See initial ED Provider note for details of the presentation.     Patient is medically cleared for admission to a Behavioral Health unit.      Pending studies include none.      The patient is not on a hold.  holdable    The patient has required medication for agitation.    Recent psych admission, medication changes, suicidal ideations in context of alcohol intoxication.     Medications   DULoxetine (CYMBALTA) DR capsule 30 mg (30 mg Oral $Given 11/15/23 1111)   doxepin (SINEquan) capsule 10 mg (10 mg Oral $Given 11/15/23 0017)   hydrOXYzine (ATARAX) tablet 25 mg (25 mg Oral $Given 11/15/23 0138)   risperiDONE (risperDAL) tablet 0.5 mg (0.5 mg Oral $Given 11/15/23 0017)   acetaminophen (TYLENOL) tablet 650 mg (has no administration in time range)   OLANZapine zydis (zyPREXA) ODT tab 10 mg (10 mg Oral $Given 11/15/23 1357)   melatonin tablet 3 mg (3 mg Oral $Given 11/15/23 0138)   ibuprofen (ADVIL/MOTRIN) tablet 600 mg (600 mg Oral $Given 11/15/23 0021)       Exam:   Patient Vitals for the past 24 hrs:   BP Temp Temp src Pulse Resp SpO2 Height Weight   11/15/23 1117 135/88 97.8  F (36.6  C) Oral 88 18 95 % -- --   11/14/23 2136 (!) 145/99 99.2  F (37.3  C) Tympanic 101 18 95 % 1.524 m (5') 104.3 kg (230 lb)         ED Course:    There were no significant events while under my care.      Patient was signed out to the oncoming provider. Dr. Desai      Impression:    ICD-10-CM    1. Suicidal ideation  R45.851           Plan:    1. Await Transfer to Mental Health Facility      RESULTS:   Results for orders placed or performed during the hospital encounter of 11/14/23 (from the past 24 hour(s))   [facility] ([facility]) Assessment Consult Order:     Status: None ()    Collection Time: 11/14/23 11:08 PM    Coco Guy LICSW     11/15/2023 12:51  AM  Diagnostic Evaluation Consultation  Crisis Assessment    Patient Name: Sveta Cuellar  Age:  38 year old  Legal Sex: female  Gender Identity: female  Pronouns:   Race: White  Ethnicity: Choose not to answer  Language: English      Patient was assessed: Virtual: [place] Crisis Assessment Start   Time: 1117 Crisis Assessment Stop Time: 1151  Patient location: [facility] EMERGENCY DEPT                             ED05    Referral Data and Chief Complaint  Sveta Cuellar presents to the ED via EMS. Patient is   presenting to the ED for the following concerns: Suicidal   ideation, Intoxication.   Factors that make the mental health   crisis life threatening or complex are:  Pt out of IP  about a   week; new meds were started when pt went to IP  since she had   taken excess of Rx Trazadone; patient ingested alcohol tonight.     Pt said she did not have a plan to end life but does ask multiple   times if she's going back into IP .  She says she does not feel   safe to go home at time of note.      Informed Consent and Assessment Methods  Explained the crisis assessment process, including applicable   information disclosures and limits to confidentiality, assessed   understanding of the process, and obtained consent to proceed   with the assessment.  Assessment methods included conducting a   formal interview with patient, review of medical records,   collaboration with medical staff, and obtaining relevant   collateral information from family and community providers when   available.  : done     Patient response to interventions: acceptance expressed  Coping skills were attempted to reduce the crisis:  pt did call   someone tonight and did not self harm/attempt to harm self     History of the Crisis   Pt says she found out today that a significant other does not   take relationship as seriously as she and some additional   upsetting information.  She called a friend and decided she   needed  to come to ED as she had suicidal thoughts.Pt reports no   plan and that she thinks she needs an IRTS as living alone   doesn't seem to allow the appropriate level of care patient needs   to stay emotionally regulated and safe.  Pt has PMH dx to include   MDD BPD and PTSD.   Pt has hx of housing instabilty but reports   she has had her apartment since January of this year.   Pt's   therapist recommended pt think about IRTS level of care and pt   agrees she needs a higher level of care going forward.   As noted   above, pt states she does not feel safe to leave ED at this time.    Since she is forward thinking, has no plan and did consume   alcohol, patient will be administered her meds, allowed to sleep   in ED and reassessed for discharge option in the morning.    Brief Psychosocial History  Family:  Single, Children no  Support System:  Sibling(s), Other (specify)  Employment Status:  unemployed  Source of Income:  social security  Financial Environmental Concerns:  unemployed  Current Hobbies:  television/movies/videos  Barriers in Personal Life:  behavioral concerns, mental health   concerns    Significant Clinical History  Current Anxiety Symptoms:  obsessions/compulsions  Current Depression/Trauma:  crying or feels like crying,   hopelessness, helplessness, avoidance  Current Somatic Symptoms:     Current Psychosis/Thought Disturbance:     Current Eating Symptoms:     Chemical Use History:  Alcohol: Binge   Past diagnosis:  Depression, PTSD, Personality Disorder  Family history:  No known history of mental health or chemical   health concerns  Past treatment:  Individual therapy, Case management, Psychiatric   Medication Management, Primary Care, Inpatient Hospitalization,   Day Treatment  Details of most recent treatment:  Pt was in [facility] with discharge   about a week or so ago.   pt currently has a therapist,   psychiatrist, and   Other relevant history:  pt has a hx of inpatient mental health    hospitalizations       Collateral Information  Is there collateral information: No        Risk Assessment  [place] Suicide Severity Rating Scale Full Clinical Version:  Suicidal Ideation  Q1 Wish to be Dead (Lifetime): Yes  Q2 Non-Specific Active Suicidal Thoughts (Lifetime): Yes  3. Active Suicidal Ideation with any Methods (Not Plan) Without   Intent to Act (Lifetime): Yes  Q4 Active Suicidal Ideation with Some Intent to Act, Without   Specific Plan (Lifetime): Yes  Q5 Active Suicidal Ideation with Specific Plan and Intent   (Lifetime): Yes  Q6 Suicide Behavior (Lifetime): yes     Suicidal Behavior (Lifetime)  Actual Attempt (Lifetime): Yes  Has subject engaged in non-suicidal self-injurious behavior?   (Lifetime): Yes  Interrupted Attempts (Lifetime): Yes  Aborted or Self-Interrupted Attempt (Lifetime): Yes  Preparatory Acts or Behavior (Lifetime): Yes    [place] Suicide Severity Rating Scale Recent:   Suicidal Ideation (Recent)  Q3 Suicidal Thought Method: no  Q4 Suicidal Intent without Specific Plan: no  Q5 Suicide Intent with Specific Plan: no     Suicidal Behavior (Recent)  Actual Attempt (Past 3 Months): Yes  Has subject engaged in non-suicidal self-injurious behavior?   (Past 3 Months): No  Interrupted Attempts (Past 3 Months): No  Aborted or Self-Interrupted Attempt (Past 3 Months): Yes  Preparatory Acts or Behavior (Past 3 Months): Yes    Environmental or Psychosocial Events: helplessness/hopelessness,   impulsivity/recklessness, unemployment/underemployment, other   life stressors  Protective Factors: Protective Factors: lives in a responsibly   safe and stable environment, help seeking    Does the patient have thoughts of harming others? Feels Like   Hurting Others: no  Previous Attempt to Hurt Others: no  Is the patient engaging in sexually inappropriate behavior?: no    Is the patient engaging in sexually inappropriate behavior?  no          Mental Status Exam   Affect: Constricted  Appearance:  Appropriate  Attention Span/Concentration: Other (please comment)  Eye Contact: Variable    Fund of Knowledge: Appropriate   Language /Speech Content: Fluent  Language /Speech Volume: Soft  Language /Speech Rate/Productions: Slow, Normal, Minimally   Responsive, Other (please comment)  Recent Memory: Intact  Remote Memory: Intact  Mood: Apathetic  Orientation to Person: Yes   Orientation to Place: Yes  Orientation to Time of Day: Yes  Orientation to Date: Yes     Situation (Do they understand why they are here?): Yes  Psychomotor Behavior: Normal  Thought Content: Clear  Thought Form: Intact     Mini-Cog Assessment  Number of Words Recalled:    Clock-Drawing Test:     Three Item Recall:    Mini-Cog Total Score:       Medication  Psychotropic medications:   Medication Orders - Psychiatric (From admission, onward)      Start     Dose/Rate Route Frequency Ordered Stop    11/15/23 0800  DULoxetine (CYMBALTA) DR capsule 30 mg         30 mg Oral DAILY 11/14/23 2325      11/15/23 0005  OLANZapine zydis (zyPREXA) ODT tab 10 mg         10 mg Oral 2 TIMES DAILY PRN 11/15/23 0005      11/14/23 2330  doxepin (SINEquan) capsule 10 mg         10 mg Oral AT BEDTIME 11/14/23 2325 11/14/23 2330  risperiDONE (risperDAL) tablet 0.5 mg         0.5 mg Oral AT BEDTIME 11/14/23 2325 11/14/23 2324  hydrOXYzine (ATARAX) tablet 25 mg         25 mg Oral 3 TIMES DAILY PRN 11/14/23 2325               Current Care Team  Patient Care Team:  No Ref-Primary, Physician as PCP - General Heath Vasquez as Therapist  April Thomas as Psychiatrist  Miky (last name unkown) as     Diagnosis  Patient Active Problem List   Diagnosis Code    Prenatal care in third trimester Z34.93    PTSD (post-traumatic stress disorder) F43.10    Depression F32.A    Housing lack Z59.00    Urinary tract infection in mother during third trimester of   pregnancy O23.43    Encounter for triage in pregnant patient Z36.89    Disassociation F48.8    Major  depressive disorder, recurrent episode, moderate (H) F33.1      Borderline personality disorder (H) F60.3       Primary Problem This Admission  Active Hospital Problems    *Borderline personality disorder (H)      Major depressive disorder, recurrent episode, moderate (H)        Clinical Summary and Substantiation of Recommendations   Writer believes patient was hoping for admission to [facility].  She does not appear imminently suicidial however.  She   is forward thinking and has some concrete future plans.   She   states however that she does not feel safe to go home.     Patient's therapist has suggested to patient that an IRTS program   may be an appropriate level of care as patient is having   difficulty living alone with once week therapy and psychiatric   care.  As noted, there was also some level of ETOH consumption on   part of patient.    Thus far in ED this encounter, a lower level   of care has been unsuccessful in treating and stabilizing   patient s mental health symptoms  However, with brief   observation, monitored therapeutic treatment, and intervention of   mental health symptoms in the ED, symptoms may be mitigated with   potential for disposition to a less restrictive level of care   than an inpatient setting.  The ED will call DEC in the morning   for a reassessment of patient's ability to discharge safely from   ED.        Patient coping skills attempted to reduce the crisis:  pt did   call someone tonight and did not self harm/attempt to harm self    Disposition  Recommended disposition: Observation        Reviewed case and recommendations with attending provider.   Attending Name: Reuben Powell MD       Attending concurs with disposition: yes       Patient and/or validated legal guardian concurs with disposition:     yes       Final disposition:  observation    Legal status on admission:      Assessment Details   Total duration spent with the patient: 33 min     CPT code(s)  utilized: 63587 - Psychotherapy for Crisis - 60   (30-74*) min    Coco Barnhart [facility], Psychotherapist  DEC - Triage & Transition Services  Callback: [phone]           Asymptomatic Influenza A/B, RSV, & SARS-CoV2 PCR (COVID-19) Nose     Status: Normal    Collection Time: 11/15/23 11:11 AM    Specimen: Nose; Swab   Result Value Ref Range    Influenza A PCR Negative Negative    Influenza B PCR Negative Negative    RSV PCR Negative Negative    SARS CoV2 PCR Negative Negative    Narrative    Testing was performed using the Xpert Xpress CoV2/Flu/RSV Assay on the Cepheid GeneXpert Instrument. This test should be ordered for the detection of SARS-CoV-2, influenza, and RSV viruses in individuals who meet clinical and/or epidemiological criteria. Test performance is unknown in asymptomatic patients. This test is for in vitro diagnostic use under the FDA EUA for laboratories certified under CLIA to perform high or moderate complexity testing. This test has not been FDA cleared or approved. A negative result does not rule out the presence of PCR inhibitors in the specimen or target RNA in concentration below the limit of detection for the assay. If only one viral target is positive but coinfection with multiple targets is suspected, the sample should be re-tested with another FDA cleared, approved, or authorized test, if coinfection would change clinical management. This test was validated by the [facility]. These laboratories are certified under the Clinical Laboratory Improvement Amendments of 1988 (CLIA-88) as qualified to perform high complexity laboratory testing.   Urine Drug Screen     Status: Normal    Collection Time: 11/15/23 11:35 AM    Narrative    The following orders were created for panel order Urine Drug Screen.  Procedure                               Abnormality         Status                     ---------                               -----------         ------                      Urine Drug Screen Panel[[id number]]      Normal              Final result                 Please view results for these tests on the individual orders.   Urine Drug Screen Panel     Status: Normal    Collection Time: 11/15/23 11:35 AM   Result Value Ref Range    Amphetamines Urine Screen Negative Screen Negative    Barbituates Urine Screen Negative Screen Negative    Benzodiazepine Urine Screen Negative Screen Negative    Cannabinoids Urine Screen Negative Screen Negative    Cocaine Urine Screen Negative Screen Negative    Fentanyl Qual Urine Screen Negative Screen Negative    Opiates Urine Screen Negative Screen Negative    PCP Urine Screen Negative Screen Negative   HCG qualitative urine     Status: Normal    Collection Time: 11/15/23 11:37 AM   Result Value Ref Range    hCG Urine Qualitative Negative Negative         MD Ruth Castillo Richard J, MD  11/15/23 5597

## 2023-11-15 NOTE — CARE PLAN
Sveta MADRIGAL [place]  November 15, 2023  Plan of Care Hand-off Note     Patient Care Path: observation; DEC will reassess for ability to discharge in morning; ED will call when pt wakes    Plan for Care:   Writer believes patient was hoping for admission to [facility].  She does not appear imminent suicidial however.  She is forward thinking and has some concreted future plans.   She states however that she does not feel safe to go home.   patient's therapist has suggested to patient that an IRTS program may be an appropriate level of care as patient is having difficulty living alone with once week therapy and psychiatric care.  As noted, there was also some level of ETOH consumption on part of patient.    Thus far in ED this encounter, a lower level of care has been unsuccessful in treating and stabilizing patient s mental health symptoms  However, with brief observation, monitored therapeutic treatment, and intervention of mental health symptoms in the ED, symptoms may be mitigated with potential for disposition to a less restrictive level of care than an inpatient setting.  The ED will call DEC in the morning for a reassessment of patient's ability to discharge safely from ED.    Identified Goals and Safety Issues: stabilize, metabolize, sleep    Overview:  (P) Mother -- pt does not note as a support (P) Father-- pt does not note as a support    Legal Status:  voluntary in ED    Psychiatry Consult:  No, patient had recent psychiatric consultation       Updated   regarding plan of care.  Attending and ED Staff          PARADISE Kline

## 2023-11-15 NOTE — TELEPHONE ENCOUNTER
4:27 PM Per call to Sam at [facility]  willing to review patient at [facility] for admission to fax clinical [phone].  5:00 PM Fax Sent.    R: [place]  Access Inpatient Bed Call Log 11/15/23 9:05 AM   Intake has called facilities that have not updated the bed status within the last 12 hours.  (Statewide)                   [facility] is at capacity.              [facility] is posting 0 beds. [phone].    [facility] is posting 1 bed. Negative covid required.                 [facility] is posting 0 bed. Negative covid required. [phone]. Per call at 9:13am to [facility] low acuity only.   [facility] is posting 0 beds. [phone]   [facility] is posting 0 beds. [phone].    [facility] is posting 3 beds. Call for details. Negative covid.  [phone]. 11/15 Pt not appropriate d/t facility restrictions.  [facility] is posting 0 beds           [facility] is posting 0 bed.     [facility] is posting 0 beds. LOW acuity ONLY. Mixed unit 12+. Negative covid- [phone].     [facility] has 1 beds posted. No aggression. Negative Covid. Low acuity.      [facility] is posting 0 beds. Negative covid. [phone]   [facility]) is posting 5 beds. Low acuity only. Negative covid.  [phone].    [facility] is posting 1 beds. Low acuity. No current aggression. [phone]   [facility]) is posting 7 beds available. Negative covid.  [phone].      [facility] is posting 0 bed. Low acuity. 72 HH hold preferred. Negative covid required. [phone]. Per call at 9:15am to [facility] for cb from resource nurse.   [facility] is posting 3 beds. Low acuity only. Negative covid.  [phone]. Low acuity only.     [facility] [place] is posting 2 beds.  Negative covid required.   Vol only, No history of aggression, violence, or  assault. No sexual offenders. No 72  holds. [phone].     [facility] is posting 6 beds. Negative covid required.  (Must have the cognitive ability to do programming. No aggressive or violent behavior or recent HX in the last 2 yrs. MH must be primary.) Always low acuity. [phone]. Per call at 9:19am to [place] beds are available for screening.   [facility] has 4 beds posted. Negative covid required.  Low acuity only. Violence and aggression capped.  [phone]. Low acuity only.   [facility] is posting 4 beds. Low acuity, Negative covid required. [phone].    [facility] is posting 6 beds. Unit is a combined unit (14+). No aggressive patients. Voluntary only. Must be accompanied by a guardian.  Negative covid. [phone].     [facility] posting 1 bed. Negative covid required.  [phone].    [facility] [place] is posting 4 beds. Negative covid. LOW acuity. (No lines, drains, or tubes, oxygen, CPAP, IV, etc.). [phone]. Per call at 9:20am to [facility] beds are available.    [facility] is posting 16 beds. No covid test required.  [phone].    [facility] ([facility]) is posting 3 beds. Negative covid. (No. lines, drains, or tubes, oxygen, CPAP, IV, etc.). [phone]      Patient remains on the work list pending appropriate bed availability.

## 2023-11-15 NOTE — CONSULTS
Diagnostic Evaluation Consultation  Crisis Assessment    Patient Name: Sveta Cuellar  Age:  38 year old  Legal Sex: female  Gender Identity: female  Pronouns:   Race: White  Ethnicity: Choose not to answer  Language: English      Patient was assessed: Virtual: PassHat Crisis Assessment Start Time: 1117 Crisis Assessment Stop Time: 1151  Patient location: [facility] EMERGENCY DEPT                             ED05    Referral Data and Chief Complaint  Sveta Cuellar presents to the ED via EMS. Patient is presenting to the ED for the following concerns: Suicidal ideation, Intoxication.   Factors that make the mental health crisis life threatening or complex are:  Pt out of IP  about a week; new meds were started when pt went to IP  since she had taken excess of Rx Trazadone; patient ingested alcohol tonight.   Pt said she did not have a plan to end life but does ask multiple times if she's going back into IP .  She says she does not feel safe to go home at time of note.      Informed Consent and Assessment Methods  Explained the crisis assessment process, including applicable information disclosures and limits to confidentiality, assessed understanding of the process, and obtained consent to proceed with the assessment.  Assessment methods included conducting a formal interview with patient, review of medical records, collaboration with medical staff, and obtaining relevant collateral information from family and community providers when available.  : done     Patient response to interventions: acceptance expressed  Coping skills were attempted to reduce the crisis:  pt did call someone tonight and did not self harm/attempt to harm self     History of the Crisis   Pt says she found out today that a significant other does not take relationship as seriously as she and some additional upsetting information.  She called a friend and decided she needed to come to ED as she had suicidal  thoughts.Pt reports no plan and that she thinks she needs an IRTS as living alone doesn't seem to allow the appropriate level of care patient needs to stay emotionally regulated and safe.  Pt has PMH dx to include MDD BPD and PTSD.   Pt has hx of housing instabilty but reports she has had her apartment since January of this year.   Pt's therapist recommended pt think about IRTS level of care and pt agrees she needs a higher level of care going forward.   As noted above, pt states she does not feel safe to leave ED at this time.  Since she is forward thinking, has no plan and did consume alcohol, patient will be administered her meds, allowed to sleep in ED and reassessed for discharge option in the morning.    Brief Psychosocial History  Family:  Single, Children no  Support System:  Sibling(s), Other (specify)  Employment Status:  unemployed  Source of Income:  social security  Financial Environmental Concerns:  unemployed  Current Hobbies:  television/movies/videos  Barriers in Personal Life:  behavioral concerns, mental health concerns    Significant Clinical History  Current Anxiety Symptoms:  obsessions/compulsions  Current Depression/Trauma:  crying or feels like crying, hopelessness, helplessness, avoidance  Current Somatic Symptoms:     Current Psychosis/Thought Disturbance:     Current Eating Symptoms:     Chemical Use History:  Alcohol: Binge   Past diagnosis:  Depression, PTSD, Personality Disorder  Family history:  No known history of mental health or chemical health concerns  Past treatment:  Individual therapy, Case management, Psychiatric Medication Management, Primary Care, Inpatient Hospitalization, Day Treatment  Details of most recent treatment:  Pt was in [facility] with discharge about a week or so ago.   pt currently has a therapist, psychiatrist, and   Other relevant history:  pt has a hx of inpatient mental health hospitalizations       Collateral Information  Is there collateral  information: No        Risk Assessment  [place] Suicide Severity Rating Scale Full Clinical Version:  Suicidal Ideation  Q1 Wish to be Dead (Lifetime): Yes  Q2 Non-Specific Active Suicidal Thoughts (Lifetime): Yes  3. Active Suicidal Ideation with any Methods (Not Plan) Without Intent to Act (Lifetime): Yes  Q4 Active Suicidal Ideation with Some Intent to Act, Without Specific Plan (Lifetime): Yes  Q5 Active Suicidal Ideation with Specific Plan and Intent (Lifetime): Yes  Q6 Suicide Behavior (Lifetime): yes     Suicidal Behavior (Lifetime)  Actual Attempt (Lifetime): Yes  Has subject engaged in non-suicidal self-injurious behavior? (Lifetime): Yes  Interrupted Attempts (Lifetime): Yes  Aborted or Self-Interrupted Attempt (Lifetime): Yes  Preparatory Acts or Behavior (Lifetime): Yes    [place] Suicide Severity Rating Scale Recent:   Suicidal Ideation (Recent)  Q3 Suicidal Thought Method: no  Q4 Suicidal Intent without Specific Plan: no  Q5 Suicide Intent with Specific Plan: no     Suicidal Behavior (Recent)  Actual Attempt (Past 3 Months): Yes  Has subject engaged in non-suicidal self-injurious behavior? (Past 3 Months): No  Interrupted Attempts (Past 3 Months): No  Aborted or Self-Interrupted Attempt (Past 3 Months): Yes  Preparatory Acts or Behavior (Past 3 Months): Yes    Environmental or Psychosocial Events: helplessness/hopelessness, impulsivity/recklessness, unemployment/underemployment, other life stressors  Protective Factors: Protective Factors: lives in a responsibly safe and stable environment, help seeking    Does the patient have thoughts of harming others? Feels Like Hurting Others: no  Previous Attempt to Hurt Others: no  Is the patient engaging in sexually inappropriate behavior?: no    Is the patient engaging in sexually inappropriate behavior?  no        Mental Status Exam   Affect: Constricted  Appearance: Appropriate  Attention Span/Concentration: Other (please comment)  Eye Contact: Variable     Fund of Knowledge: Appropriate   Language /Speech Content: Fluent  Language /Speech Volume: Soft  Language /Speech Rate/Productions: Slow, Normal, Minimally Responsive, Other (please comment)  Recent Memory: Intact  Remote Memory: Intact  Mood: Apathetic  Orientation to Person: Yes   Orientation to Place: Yes  Orientation to Time of Day: Yes  Orientation to Date: Yes     Situation (Do they understand why they are here?): Yes  Psychomotor Behavior: Normal  Thought Content: Clear  Thought Form: Intact     Mini-Cog Assessment  Number of Words Recalled:    Clock-Drawing Test:     Three Item Recall:    Mini-Cog Total Score:       Medication  Psychotropic medications:   Medication Orders - Psychiatric (From admission, onward)      Start     Dose/Rate Route Frequency Ordered Stop    11/15/23 0800  DULoxetine (CYMBALTA) DR capsule 30 mg         30 mg Oral DAILY 11/14/23 2325      11/15/23 0005  OLANZapine zydis (zyPREXA) ODT tab 10 mg         10 mg Oral 2 TIMES DAILY PRN 11/15/23 0005      11/14/23 2330  doxepin (SINEquan) capsule 10 mg         10 mg Oral AT BEDTIME 11/14/23 2325 11/14/23 2330  risperiDONE (risperDAL) tablet 0.5 mg         0.5 mg Oral AT BEDTIME 11/14/23 2325 11/14/23 2324  hydrOXYzine (ATARAX) tablet 25 mg         25 mg Oral 3 TIMES DAILY PRN 11/14/23 2325               Current Care Team  Patient Care Team:  No Ref-Primary, Physician as PCP - General Heath Vasquez as Therapist  April Thomas as Psychiatrist  Miky (last name unkown) as     Diagnosis  Patient Active Problem List   Diagnosis Code    Prenatal care in third trimester Z34.93    PTSD (post-traumatic stress disorder) F43.10    Depression F32.A    Housing lack Z59.00    Urinary tract infection in mother during third trimester of pregnancy O23.43    Encounter for triage in pregnant patient Z36.89    Disassociation F48.8    Major depressive disorder, recurrent episode, moderate (H) F33.1    Borderline personality disorder (H)  F60.3       Primary Problem This Admission  Active Hospital Problems    *Borderline personality disorder (H)      Major depressive disorder, recurrent episode, moderate (H)        Clinical Summary and Substantiation of Recommendations   Writer believes patient was hoping for admission to [facility].  She does not appear imminently suicidial however.  She is forward thinking and has some concrete future plans.   She states however that she does not feel safe to go home.   Patient's therapist has suggested to patient that an IRTS program may be an appropriate level of care as patient is having difficulty living alone with once week therapy and psychiatric care.  As noted, there was also some level of ETOH consumption on part of patient.    Thus far in ED this encounter, a lower level of care has been unsuccessful in treating and stabilizing patient s mental health symptoms  However, with brief observation, monitored therapeutic treatment, and intervention of mental health symptoms in the ED, symptoms may be mitigated with potential for disposition to a less restrictive level of care than an inpatient setting.  The ED will call DEC in the morning for a reassessment of patient's ability to discharge safely from ED.        Patient coping skills attempted to reduce the crisis:  pt did call someone tonight and did not self harm/attempt to harm self    Disposition  Recommended disposition: Observation        Reviewed case and recommendations with attending provider. Attending Name: Reuben Powell MD       Attending concurs with disposition: yes       Patient and/or validated legal guardian concurs with disposition:   yes       Final disposition:  observation    Legal status on admission:      Assessment Details   Total duration spent with the patient: 33 min     CPT code(s) utilized: 19633 - Psychotherapy for Crisis - 60 (30-74*) min    Coco Barnhart [facility], Psychotherapist  DEC - Triage & Transition  Services  Callback: [phone]

## 2023-11-15 NOTE — ED PROVIDER NOTES
Emergency Department Psychiatric Patient Sign-out       Brief HPI:  This is a 38 year old female signed out to me by Dr. Powell .  See initial ED Provider note for details of the presentation.     Patient is medically cleared for admission to a Behavioral Health unit.      Pending studies includ      The patient is not on a hold.      The patient has not required medication for agitation.    Was reevaluated by Shashank from DEC, I spoke with him this morning, recommends mental health admission for suicidal ideation.  Patient is voluntary, UPT, urine tox and COVID are ordered.    Medications   DULoxetine (CYMBALTA) DR capsule 30 mg (30 mg Oral $Given 11/15/23 1111)   doxepin (SINEquan) capsule 10 mg (10 mg Oral $Given 11/15/23 0017)   hydrOXYzine (ATARAX) tablet 25 mg (25 mg Oral $Given 11/15/23 0138)   risperiDONE (risperDAL) tablet 0.5 mg (0.5 mg Oral $Given 11/15/23 0017)   acetaminophen (TYLENOL) tablet 650 mg (has no administration in time range)   OLANZapine zydis (zyPREXA) ODT tab 10 mg (has no administration in time range)   melatonin tablet 3 mg (3 mg Oral $Given 11/15/23 0138)   ibuprofen (ADVIL/MOTRIN) tablet 600 mg (600 mg Oral $Given 11/15/23 0021)       Exam:   Patient Vitals for the past 24 hrs:   BP Temp Temp src Pulse Resp SpO2 Height Weight   11/15/23 1117 135/88 97.8  F (36.6  C) Oral 88 18 95 % -- --   11/14/23 2136 (!) 145/99 99.2  F (37.3  C) Tympanic 101 18 95 % 1.524 m (5') 104.3 kg (230 lb)         ED Course:    There were no significant events while under my care.      Patient was signed out to the oncoming provider. Dr. Ortiz      Impression:    ICD-10-CM    1. Suicidal ideation  R45.851           Plan:    1. Await Transfer to Mental Health Facility      RESULTS:   Results for orders placed or performed during the hospital encounter of 11/14/23 (from the past 24 hour(s))   [facility] Assessment Consult Order:     Status: None ()    Collection Time: 11/14/23 11:08  PM    Narrative    Coco Barnhart, [facility]     11/15/2023 12:51 AM  Diagnostic Evaluation Consultation  Crisis Assessment    Patient Name: Sveta Cuellar  Age:  38 year old  Legal Sex: female  Gender Identity: female  Pronouns:   Race: White  Ethnicity: Choose not to answer  Language: English      Patient was assessed: Virtual: Adreal Crisis Assessment Start   Time: 1117 Crisis Assessment Stop Time: 1151  Patient location: [facility] EMERGENCY DEPT                             ED05    Referral Data and Chief Complaint  Sveta Cuellar presents to the ED via EMS. Patient is   presenting to the ED for the following concerns: Suicidal   ideation, Intoxication.   Factors that make the mental health   crisis life threatening or complex are:  Pt out of IP MH about a   week; new meds were started when pt went to IP  since she had   taken excess of Rx Trazadone; patient ingested alcohol tonight.     Pt said she did not have a plan to end life but does ask multiple   times if she's going back into IP MH.  She says she does not feel   safe to go home at time of note.      Informed Consent and Assessment Methods  Explained the crisis assessment process, including applicable   information disclosures and limits to confidentiality, assessed   understanding of the process, and obtained consent to proceed   with the assessment.  Assessment methods included conducting a   formal interview with patient, review of medical records,   collaboration with medical staff, and obtaining relevant   collateral information from family and community providers when   available.  : done     Patient response to interventions: acceptance expressed  Coping skills were attempted to reduce the crisis:  pt did call   someone tonight and did not self harm/attempt to harm self     History of the Crisis   Pt says she found out today that a significant other does not   take relationship as seriously as she and some additional    upsetting information.  She called a friend and decided she   needed to come to ED as she had suicidal thoughts.Pt reports no   plan and that she thinks she needs an IRTS as living alone   doesn't seem to allow the appropriate level of care patient needs   to stay emotionally regulated and safe.  Pt has PMH dx to include   MDD BPD and PTSD.   Pt has hx of housing instabilty but reports   she has had her apartment since January of this year.   Pt's   therapist recommended pt think about IRTS level of care and pt   agrees she needs a higher level of care going forward.   As noted   above, pt states she does not feel safe to leave ED at this time.    Since she is forward thinking, has no plan and did consume   alcohol, patient will be administered her meds, allowed to sleep   in ED and reassessed for discharge option in the morning.    Brief Psychosocial History  Family:  Single, Children no  Support System:  Sibling(s), Other (specify)  Employment Status:  unemployed  Source of Income:  social security  Financial Environmental Concerns:  unemployed  Current Hobbies:  television/movies/videos  Barriers in Personal Life:  behavioral concerns, mental health   concerns    Significant Clinical History  Current Anxiety Symptoms:  obsessions/compulsions  Current Depression/Trauma:  crying or feels like crying,   hopelessness, helplessness, avoidance  Current Somatic Symptoms:     Current Psychosis/Thought Disturbance:     Current Eating Symptoms:     Chemical Use History:  Alcohol: Binge   Past diagnosis:  Depression, PTSD, Personality Disorder  Family history:  No known history of mental health or chemical   health concerns  Past treatment:  Individual therapy, Case management, Psychiatric   Medication Management, Primary Care, Inpatient Hospitalization,   Day Treatment  Details of most recent treatment:  Pt was in [facility] with discharge   about a week or so ago.   pt currently has a therapist,   psychiatrist, and case  manager  Other relevant history:  pt has a hx of inpatient mental health   hospitalizations       Collateral Information  Is there collateral information: No        Risk Assessment  [place] Suicide Severity Rating Scale Full Clinical Version:  Suicidal Ideation  Q1 Wish to be Dead (Lifetime): Yes  Q2 Non-Specific Active Suicidal Thoughts (Lifetime): Yes  3. Active Suicidal Ideation with any Methods (Not Plan) Without   Intent to Act (Lifetime): Yes  Q4 Active Suicidal Ideation with Some Intent to Act, Without   Specific Plan (Lifetime): Yes  Q5 Active Suicidal Ideation with Specific Plan and Intent   (Lifetime): Yes  Q6 Suicide Behavior (Lifetime): yes     Suicidal Behavior (Lifetime)  Actual Attempt (Lifetime): Yes  Has subject engaged in non-suicidal self-injurious behavior?   (Lifetime): Yes  Interrupted Attempts (Lifetime): Yes  Aborted or Self-Interrupted Attempt (Lifetime): Yes  Preparatory Acts or Behavior (Lifetime): Yes    [place] Suicide Severity Rating Scale Recent:   Suicidal Ideation (Recent)  Q3 Suicidal Thought Method: no  Q4 Suicidal Intent without Specific Plan: no  Q5 Suicide Intent with Specific Plan: no     Suicidal Behavior (Recent)  Actual Attempt (Past 3 Months): Yes  Has subject engaged in non-suicidal self-injurious behavior?   (Past 3 Months): No  Interrupted Attempts (Past 3 Months): No  Aborted or Self-Interrupted Attempt (Past 3 Months): Yes  Preparatory Acts or Behavior (Past 3 Months): Yes    Environmental or Psychosocial Events: helplessness/hopelessness,   impulsivity/recklessness, unemployment/underemployment, other   life stressors  Protective Factors: Protective Factors: lives in a responsibly   safe and stable environment, help seeking    Does the patient have thoughts of harming others? Feels Like   Hurting Others: no  Previous Attempt to Hurt Others: no  Is the patient engaging in sexually inappropriate behavior?: no    Is the patient engaging in sexually inappropriate  behavior?  no          Mental Status Exam   Affect: Constricted  Appearance: Appropriate  Attention Span/Concentration: Other (please comment)  Eye Contact: Variable    Fund of Knowledge: Appropriate   Language /Speech Content: Fluent  Language /Speech Volume: Soft  Language /Speech Rate/Productions: Slow, Normal, Minimally   Responsive, Other (please comment)  Recent Memory: Intact  Remote Memory: Intact  Mood: Apathetic  Orientation to Person: Yes   Orientation to Place: Yes  Orientation to Time of Day: Yes  Orientation to Date: Yes     Situation (Do they understand why they are here?): Yes  Psychomotor Behavior: Normal  Thought Content: Clear  Thought Form: Intact     Mini-Cog Assessment  Number of Words Recalled:    Clock-Drawing Test:     Three Item Recall:    Mini-Cog Total Score:       Medication  Psychotropic medications:   Medication Orders - Psychiatric (From admission, onward)      Start     Dose/Rate Route Frequency Ordered Stop    11/15/23 0800  DULoxetine (CYMBALTA) DR capsule 30 mg         30 mg Oral DAILY 11/14/23 2325      11/15/23 0005  OLANZapine zydis (zyPREXA) ODT tab 10 mg         10 mg Oral 2 TIMES DAILY PRN 11/15/23 0005      11/14/23 2330  doxepin (SINEquan) capsule 10 mg         10 mg Oral AT BEDTIME 11/14/23 2325 11/14/23 2330  risperiDONE (risperDAL) tablet 0.5 mg         0.5 mg Oral AT BEDTIME 11/14/23 2325 11/14/23 2324  hydrOXYzine (ATARAX) tablet 25 mg         25 mg Oral 3 TIMES DAILY PRN 11/14/23 2325               Current Care Team  Patient Care Team:  No Ref-Primary, Physician as PCP - General Heath Vasquez as Therapist  Nkechi Guzman as Psychiatrist  Miky (last name unbaltazar) as     Diagnosis  Patient Active Problem List   Diagnosis Code    Prenatal care in third trimester Z34.93    PTSD (post-traumatic stress disorder) F43.10    Depression F32.A    Housing lack Z59.00    Urinary tract infection in mother during third trimester of   pregnancy O23.43    Encounter  for triage in pregnant patient Z36.89    Disassociation F48.8    Major depressive disorder, recurrent episode, moderate (H) F33.1      Borderline personality disorder (H) F60.3       Primary Problem This Admission  Active Hospital Problems    *Borderline personality disorder (H)      Major depressive disorder, recurrent episode, moderate (H)        Clinical Summary and Substantiation of Recommendations   Writer believes patient was hoping for admission to [facility].  She does not appear imminently suicidial however.  She   is forward thinking and has some concrete future plans.   She   states however that she does not feel safe to go home.     Patient's therapist has suggested to patient that an IRTS program   may be an appropriate level of care as patient is having   difficulty living alone with once week therapy and psychiatric   care.  As noted, there was also some level of ETOH consumption on   part of patient.    Thus far in ED this encounter, a lower level   of care has been unsuccessful in treating and stabilizing   patient s mental health symptoms  However, with brief   observation, monitored therapeutic treatment, and intervention of   mental health symptoms in the ED, symptoms may be mitigated with   potential for disposition to a less restrictive level of care   than an inpatient setting.  The ED will call [facility] in the morning   for a reassessment of patient's ability to discharge safely from   ED.        Patient coping skills attempted to reduce the crisis:  pt did   call someone tonight and did not self harm/attempt to harm self    Disposition  Recommended disposition: Observation        Reviewed case and recommendations with attending provider.   Attending Name: Reuben Powell MD       Attending concurs with disposition: yes       Patient and/or validated legal guardian concurs with disposition:     yes       Final disposition:  observation    Legal status on admission:      Assessment Details    Total duration spent with the patient: 33 min     CPT code(s) utilized: 98589 - Psychotherapy for Crisis - 60   (30-74*) min    Coco Barnhart [facility], Psychotherapist  [facility] - Triage & Transition Services  Callback: [phone]           Asymptomatic Influenza A/B, RSV, & SARS-CoV2 PCR (COVID-19) Nose     Status: Normal    Collection Time: 11/15/23 11:11 AM    Specimen: Nose; Swab   Result Value Ref Range    Influenza A PCR Negative Negative    Influenza B PCR Negative Negative    RSV PCR Negative Negative    SARS CoV2 PCR Negative Negative    Narrative    Testing was performed using the Xpert Xpress CoV2/Flu/RSV Assay on the [employer] GeneXpert Instrument. This test should be ordered for the detection of SARS-CoV-2, influenza, and RSV viruses in individuals who meet clinical and/or epidemiological criteria. Test performance is unknown in asymptomatic patients. This test is for in vitro diagnostic use under the FDA EUA for laboratories certified under CLIA to perform high or moderate complexity testing. This test has not been FDA cleared or approved. A negative result does not rule out the presence of PCR inhibitors in the specimen or target RNA in concentration below the limit of detection for the assay. If only one viral target is positive but coinfection with multiple targets is suspected, the sample should be re-tested with another FDA cleared, approved, or authorized test, if coinfection would change clinical management. This test was validated by the [facility]. These laboratories are certified under the Clinical Laboratory Improvement Amendments of 1988 (CLIA-88) as qualified to perform high complexity laboratory testing.   Urine Drug Screen     Status: Normal    Collection Time: 11/15/23 11:35 AM    Narrative    The following orders were created for panel order Urine Drug Screen.  Procedure                               Abnormality         Status                     ---------                                -----------         ------                     Urine Drug Screen Panel[[id number]]      Normal              Final result                 Please view results for these tests on the individual orders.   Urine Drug Screen Panel     Status: Normal    Collection Time: 11/15/23 11:35 AM   Result Value Ref Range    Amphetamines Urine Screen Negative Screen Negative    Barbituates Urine Screen Negative Screen Negative    Benzodiazepine Urine Screen Negative Screen Negative    Cannabinoids Urine Screen Negative Screen Negative    Cocaine Urine Screen Negative Screen Negative    Fentanyl Qual Urine Screen Negative Screen Negative    Opiates Urine Screen Negative Screen Negative    PCP Urine Screen Negative Screen Negative   HCG qualitative urine     Status: Normal    Collection Time: 11/15/23 11:37 AM   Result Value Ref Range    hCG Urine Qualitative Negative Negative         MD Dick Aguilar Scott L, MD  11/15/23 4177

## 2023-11-16 ENCOUNTER — HOSPITAL ENCOUNTER (INPATIENT)
Facility: CLINIC | Age: 38
LOS: 14 days | Discharge: PSYCHIATRIC OR SUBSTANCE USE FACILITY | DRG: 885 | End: 2023-11-30
Attending: PSYCHIATRY & NEUROLOGY | Admitting: PSYCHIATRY & NEUROLOGY
Payer: COMMERCIAL

## 2023-11-16 VITALS
WEIGHT: 230 LBS | HEIGHT: 60 IN | HEART RATE: 83 BPM | DIASTOLIC BLOOD PRESSURE: 86 MMHG | BODY MASS INDEX: 45.16 KG/M2 | TEMPERATURE: 98.6 F | RESPIRATION RATE: 16 BRPM | OXYGEN SATURATION: 98 % | SYSTOLIC BLOOD PRESSURE: 124 MMHG

## 2023-11-16 DIAGNOSIS — E11.43 TYPE 2 DIABETES MELLITUS WITH DIABETIC AUTONOMIC NEUROPATHY, WITHOUT LONG-TERM CURRENT USE OF INSULIN (H): ICD-10-CM

## 2023-11-16 DIAGNOSIS — F43.10 PTSD (POST-TRAUMATIC STRESS DISORDER): ICD-10-CM

## 2023-11-16 DIAGNOSIS — G47.00 INSOMNIA, UNSPECIFIED TYPE: ICD-10-CM

## 2023-11-16 DIAGNOSIS — E78.49 OTHER HYPERLIPIDEMIA: Primary | ICD-10-CM

## 2023-11-16 PROCEDURE — 250N000013 HC RX MED GY IP 250 OP 250 PS 637: Performed by: PSYCHIATRY & NEUROLOGY

## 2023-11-16 PROCEDURE — 124N000002 HC R&B MH UMMC

## 2023-11-16 PROCEDURE — 99222 1ST HOSP IP/OBS MODERATE 55: CPT | Mod: AI | Performed by: PSYCHIATRY & NEUROLOGY

## 2023-11-16 PROCEDURE — G0177 OPPS/PHP; TRAIN & EDUC SERV: HCPCS

## 2023-11-16 PROCEDURE — 250N000013 HC RX MED GY IP 250 OP 250 PS 637: Performed by: EMERGENCY MEDICINE

## 2023-11-16 RX ORDER — HYDROXYZINE HYDROCHLORIDE 25 MG/1
25-50 TABLET, FILM COATED ORAL EVERY 6 HOURS PRN
Status: DISCONTINUED | OUTPATIENT
Start: 2023-11-16 | End: 2023-11-30 | Stop reason: HOSPADM

## 2023-11-16 RX ORDER — ATORVASTATIN CALCIUM 20 MG/1
20 TABLET, FILM COATED ORAL AT BEDTIME
Status: DISCONTINUED | OUTPATIENT
Start: 2023-11-16 | End: 2023-11-30 | Stop reason: HOSPADM

## 2023-11-16 RX ORDER — OLANZAPINE 10 MG/1
10 TABLET ORAL 3 TIMES DAILY PRN
Status: DISCONTINUED | OUTPATIENT
Start: 2023-11-16 | End: 2023-11-30 | Stop reason: HOSPADM

## 2023-11-16 RX ORDER — RISPERIDONE 0.5 MG/1
0.5 TABLET, ORALLY DISINTEGRATING ORAL AT BEDTIME
Status: DISCONTINUED | OUTPATIENT
Start: 2023-11-16 | End: 2023-11-30 | Stop reason: HOSPADM

## 2023-11-16 RX ORDER — OLANZAPINE 10 MG/2ML
10 INJECTION, POWDER, FOR SOLUTION INTRAMUSCULAR 3 TIMES DAILY PRN
Status: DISCONTINUED | OUTPATIENT
Start: 2023-11-16 | End: 2023-11-30 | Stop reason: HOSPADM

## 2023-11-16 RX ORDER — ACETAMINOPHEN 325 MG/1
650 TABLET ORAL EVERY 4 HOURS PRN
Status: DISCONTINUED | OUTPATIENT
Start: 2023-11-16 | End: 2023-11-30 | Stop reason: HOSPADM

## 2023-11-16 RX ORDER — MAGNESIUM HYDROXIDE/ALUMINUM HYDROXICE/SIMETHICONE 120; 1200; 1200 MG/30ML; MG/30ML; MG/30ML
30 SUSPENSION ORAL EVERY 4 HOURS PRN
Status: DISCONTINUED | OUTPATIENT
Start: 2023-11-16 | End: 2023-11-30 | Stop reason: HOSPADM

## 2023-11-16 RX ORDER — DULOXETIN HYDROCHLORIDE 60 MG/1
60 CAPSULE, DELAYED RELEASE ORAL DAILY
Status: DISCONTINUED | OUTPATIENT
Start: 2023-11-17 | End: 2023-11-30 | Stop reason: HOSPADM

## 2023-11-16 RX ORDER — METFORMIN HCL 500 MG
500 TABLET, EXTENDED RELEASE 24 HR ORAL DAILY
Status: DISCONTINUED | OUTPATIENT
Start: 2023-11-16 | End: 2023-11-30 | Stop reason: HOSPADM

## 2023-11-16 RX ORDER — AMOXICILLIN 250 MG
1 CAPSULE ORAL 2 TIMES DAILY PRN
Status: DISCONTINUED | OUTPATIENT
Start: 2023-11-16 | End: 2023-11-30 | Stop reason: HOSPADM

## 2023-11-16 RX ORDER — LANOLIN ALCOHOL/MO/W.PET/CERES
3 CREAM (GRAM) TOPICAL
Status: DISCONTINUED | OUTPATIENT
Start: 2023-11-16 | End: 2023-11-30 | Stop reason: HOSPADM

## 2023-11-16 RX ADMIN — RISPERIDONE 0.5 MG: 0.25 TABLET, FILM COATED ORAL at 00:39

## 2023-11-16 RX ADMIN — METFORMIN HYDROCHLORIDE 500 MG: 500 TABLET, EXTENDED RELEASE ORAL at 20:15

## 2023-11-16 RX ADMIN — RISPERIDONE 0.5 MG: 0.5 TABLET, ORALLY DISINTEGRATING ORAL at 20:15

## 2023-11-16 RX ADMIN — HYDROXYZINE HYDROCHLORIDE 25 MG: 25 TABLET ORAL at 16:21

## 2023-11-16 RX ADMIN — Medication 3 MG: at 21:20

## 2023-11-16 RX ADMIN — DOXEPIN HYDROCHLORIDE 10 MG: 10 CAPSULE ORAL at 00:39

## 2023-11-16 RX ADMIN — ATORVASTATIN CALCIUM 20 MG: 20 TABLET, FILM COATED ORAL at 20:15

## 2023-11-16 RX ADMIN — Medication 3 MG: at 01:07

## 2023-11-16 RX ADMIN — DULOXETINE HYDROCHLORIDE 30 MG: 30 CAPSULE, DELAYED RELEASE ORAL at 08:21

## 2023-11-16 ASSESSMENT — ACTIVITIES OF DAILY LIVING (ADL)
ADLS_ACUITY_SCORE: 29
ADLS_ACUITY_SCORE: 29
ORAL_HYGIENE: INDEPENDENT
DRESS: INDEPENDENT
ORAL_HYGIENE: INDEPENDENT
ADLS_ACUITY_SCORE: 35
ADLS_ACUITY_SCORE: 35
ADLS_ACUITY_SCORE: 29
HYGIENE/GROOMING: INDEPENDENT
ADLS_ACUITY_SCORE: 29
ADLS_ACUITY_SCORE: 45
DRESS: INDEPENDENT
ADLS_ACUITY_SCORE: 29
ADLS_ACUITY_SCORE: 35
ADLS_ACUITY_SCORE: 29
HYGIENE/GROOMING: INDEPENDENT
LAUNDRY: UNABLE TO COMPLETE
LAUNDRY: UNABLE TO COMPLETE

## 2023-11-16 ASSESSMENT — LIFESTYLE VARIABLES: SKIP TO QUESTIONS 9-10: 1

## 2023-11-16 NOTE — ED PROVIDER NOTES
Emergency Department Psychiatric Patient Sign-out       Brief HPI:  This is a 38 year old female signed out to me by Dr. Madison .  See initial ED Provider note for details of the presentation.     Patient is medically cleared for admission to a Behavioral Health unit.      Pending studies include none.      The patient is not on a hold.  voluntary but holdable    The patient has not required medication for agitation.    Medications   DULoxetine (CYMBALTA) DR capsule 30 mg (30 mg Oral $Given 11/15/23 1111)   doxepin (SINEquan) capsule 10 mg (10 mg Oral $Given 11/16/23 0039)   hydrOXYzine (ATARAX) tablet 25 mg (25 mg Oral $Given 11/15/23 0138)   risperiDONE (risperDAL) tablet 0.5 mg (0.5 mg Oral $Given 11/16/23 0039)   acetaminophen (TYLENOL) tablet 650 mg (has no administration in time range)   OLANZapine zydis (zyPREXA) ODT tab 10 mg (10 mg Oral $Given 11/15/23 1357)   melatonin tablet 3 mg (3 mg Oral $Given 11/16/23 0107)   ibuprofen (ADVIL/MOTRIN) tablet 600 mg (600 mg Oral $Given 11/15/23 0021)       Exam:   Patient Vitals for the past 24 hrs:   BP Temp Temp src Pulse Resp SpO2   11/16/23 0050 (!) 155/114 97.9  F (36.6  C) Oral 103 20 96 %   11/15/23 1117 135/88 97.8  F (36.6  C) Oral 88 18 95 %         ED Course:    There were no significant events while under my care.      .       Impression:    ICD-10-CM    1. Suicidal ideation  R45.851           Plan:    1. Await Transfer to Mental Health Facility      RESULTS:   Results for orders placed or performed during the hospital encounter of 11/14/23 (from the past 24 hour(s))   Asymptomatic Influenza A/B, RSV, & SARS-CoV2 PCR (COVID-19) Nose     Status: Normal    Collection Time: 11/15/23 11:11 AM    Specimen: Nose; Swab   Result Value Ref Range    Influenza A PCR Negative Negative    Influenza B PCR Negative Negative    RSV PCR Negative Negative    SARS CoV2 PCR Negative Negative    Narrative    Testing was performed using the Xpert Xpress CoV2/Flu/RSV Assay  on the [employer] GeneXpert Instrument. This test should be ordered for the detection of SARS-CoV-2, influenza, and RSV viruses in individuals who meet clinical and/or epidemiological criteria. Test performance is unknown in asymptomatic patients. This test is for in vitro diagnostic use under the FDA EUA for laboratories certified under CLIA to perform high or moderate complexity testing. This test has not been FDA cleared or approved. A negative result does not rule out the presence of PCR inhibitors in the specimen or target RNA in concentration below the limit of detection for the assay. If only one viral target is positive but coinfection with multiple targets is suspected, the sample should be re-tested with another FDA cleared, approved, or authorized test, if coinfection would change clinical management. This test was validated by the [facility] [employer]. These laboratories are certified under the Clinical Laboratory Improvement Amendments of 1988 (CLIA-88) as qualified to perform high complexity laboratory testing.   Urine Drug Screen     Status: Normal    Collection Time: 11/15/23 11:35 AM    Narrative    The following orders were created for panel order Urine Drug Screen.  Procedure                               Abnormality         Status                     ---------                               -----------         ------                     Urine Drug Screen Panel[[id number]]      Normal              Final result                 Please view results for these tests on the individual orders.   Urine Drug Screen Panel     Status: Normal    Collection Time: 11/15/23 11:35 AM   Result Value Ref Range    Amphetamines Urine Screen Negative Screen Negative    Barbituates Urine Screen Negative Screen Negative    Benzodiazepine Urine Screen Negative Screen Negative    Cannabinoids Urine Screen Negative Screen Negative    Cocaine Urine Screen Negative Screen Negative    Fentanyl Qual Urine Screen Negative  Screen Negative    Opiates Urine Screen Negative Screen Negative    PCP Urine Screen Negative Screen Negative   HCG qualitative urine     Status: Normal    Collection Time: 11/15/23 11:37 AM   Result Value Ref Range    hCG Urine Qualitative Negative Negative     transferring to [place]    MD Tj Perez Scott J, MD  11/16/23 1082

## 2023-11-16 NOTE — PLAN OF CARE
BEH IP Unit Acuity Rating Score (UARS)  Patient is given one point for every criteria they meet.    CRITERIA SCORING   On a 72 hour hold, court hold, committed, stay of commitment, or revocation 0/1    Patient LOS on BEH unit exceeds 20 days 0/1  LOS: 0   Patient under guardianship, 55+, otherwise medically complex, or under age 11 0/1   Suicide ideation without relief of precipitating factors 1/1   Current plan for suicide 0/1   Current plan for homicide 0/1   Imminent risk or actual attempt to seriously harm another without relief of factors precipitating the attempt 0/1   Severe dysfunction in daily living (ex: complete neglect for self care, extreme disruption in vegetative function, extreme deterioration in social interactions) 0/1   Recent (last 7 days) or current physical aggression in the ED or on unit 0/1   Restraints or seclusion episode in past 72 hours 0/1   Recent (last 7 days) or current verbal aggression, agitation, yelling, etc., while in the ED or unit 0/1   Active psychosis 0/1   Need for constant or near constant redirection (from leaving, from others, etc).  0/1   Intrusive or disruptive behaviors 0/1   TOTAL 1
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
## 2023-11-16 NOTE — PLAN OF CARE
"Goal Outcome Evaluation:    Patient presents with a flat/blunted and tearful affect. Reports mood as \"Okay.\" Is pleasant, polite, calm, and cooperative. Thought content presents as logical and relevant. Thought process presents as logical and relevant. Speech is clear and coherent. Eye contact is good. Patient rates depression at a #8/10 and anxiety at a #5/10. Requested PRN Hydroxyzine x1 for anxiety. Denies SIB, homicidal ideation, and auditory/visual hallucinations. Patient endorses \"Some\" suicidal ideation. Ann for safety here in the hospital. No medical issues noted. Vital signs stable. Medication compliant. Patient requested and received PRN Melatonin for sleep. Attended group this shift. Ate evening meal. Patient out in the milieu occasionally this shift working on a puzzle in the patient dining room. No interaction with staff and peers noted.    /86 (BP Location: Right arm)   Pulse 94   Temp 98.1  F (36.7  C) (Temporal)   SpO2 94%     Patient became tearful this shift talking about her childhood, lifetime of depression, and multiple hospitalizations. States that she doesn't ever feel like she is going to get past the depression. States that every year she ends up in the hospital due to her depression, and is frustrated. This writer provided emotional support, empathetic listening, reassurance, and encouragement to patient. This writer also provided patient with a stress ball and sound machine for comfort. Patient was appreciative.                  "

## 2023-11-16 NOTE — DISCHARGE INSTRUCTIONS
Behavioral Discharge Planning and Instructions    Summary: You were admitted on 11/16/2023  due to Suicidal Ideations.  You were treated by Hayes Simpson MD and discharged on 11/30/2023 from [facility] to [facility] IR located at: [address], [place] [address].    Main Diagnosis:   Major depressive disorder, recurrent, severe without psychotic features  Generalized anxiety disorder  Borderline personality disorder   Posttraumatic stress disorder    Health Care Follow-up:   Psychiatry appointment: Wednesday, December 6, 2023 @ 2pm via Telehealth  Provider: Nkechi Hendrix NP  Location: [address], [facility] [address], [place] [address]  Phone: [phone]  Fax: [phone]  Instructions: A link to your appointment will be sent via email ([email or web address]) and is accessible through [employer]. as well.  [facility] Registration will also call you @ [phone] the week of your appointment for confirmation.  HUC TO FAX AVS to above appointment, please    :  Name/Organization: [facility]  Phone: [phone]    Information will be faxed to your outpatient providers to ensure a healthy continuity of care for you.     Attend all scheduled appointments with your outpatient providers. Call at least 24 hours in advance if you need to reschedule an appointment to ensure continued access to your outpatient providers.     Major Treatments, Procedures and Findings:  You were provided with: a psychiatric assessment, assessed for medical stability, medication evaluation and/or management, group therapy, individual therapy, milieu management, and medical interventions    Symptoms to Report: feeling more aggressive, increased confusion, losing more sleep, mood getting worse, or thoughts of suicide    Early warning signs can include: increased depression or anxiety sleep disturbances increased thoughts or behaviors of suicide or self-harm     Safety and  "Wellness: Take all medicines as directed. Make no changes unless your doctor suggests them. Follow treatment recommendations. Refrain from alcohol and non-prescribed drugs. Ask your support system to help you reduce your access to items that could harm yourself or others. Items could include:    Firearms  Medicines (both prescribed and over-the-counter)  Knives and other sharp objects  Ropes and like materials  Car keys  If there is a concern for safety, call 911.     Resources:   Crisis Intervention: 416.910.4680 or 760-908-9808 (TTY: 290.454.5610).  Call anytime for help.  National [place] on Mental Illness (www.[place].mariely.org): 601.417.4117 or 408-771-6514.  Suicide Awareness Voices of Education (SAVE) (www.save.org): 366-232-DXYU (0481)  National Suicide Prevention Line (www.mentalhealthmn.org): 140-867-CMIP (4220)  Self- Management and Recovery Training., SMART-- Toll free: 669.950.2207  LAST MINUTE NETWORK.Flats&Houses  Text 4 Life: txt \"LIFE\" to 86619 for immediate support and crisis intervention  Crisis text line: Text \"[place]\" to 762277. Free, confidential, 24/7.  [facility] Rapid Response: 277.520.7339    [facility] ([place] on Mental Illness) improves the lives of children and adults with mental illnesses and their families by providing free classes on mental illnesses and support groups for adults with mental illnesses, parents and family members. For more information: Phone: 188.840.7705 Toll free: 1-280-VYNF-HELPS Website: www.namihelps.orghttp://www.namihelps.org/    Follow-up Therapy Resources:  It is recommended that you attend a DBT Adherent program following your IRTS placement. Below is a list of programs in your area or that provide telehealth options:     [facility]   Address: [address] [place] [address]  Phone: [phone]    [employer] (Virtual options available)  Address: [address], [address], [place] [address]  Phone: " [phone]    [facility] (Virtual options available)  Address: [address], [address], [place] [address]   Phone: [phone]    [facility]  Address: [address], [address], [place] [address]  Phone: [phone]    [facility]  Address: [address], [address], [place] [address]  Phone: [phone]     General Medication Instructions:   See your medication sheet(s) for instructions.   Take all medicines as directed.  Make no changes unless your doctor suggests them.   Go to all your doctor visits.  Be sure to have all your required lab tests. This way, your medicines can be refilled on time.  Do not use any drugs not prescribed by your doctor.  Avoid alcohol.    Advance Directives:   Scanned document on file with [employer]?    Is document scanned?    Honoring Choices Your Rights Handout:    Was more information offered?      The Treatment team has appreciated the opportunity to work with you. If you have any questions or concerns about your recent admission, you can contact the unit which can receive your call 24 hours a day, 7 days a week. They will be able to get in touch with a Provider if needed. The unit number is [phone].

## 2023-11-16 NOTE — PROVIDER NOTIFICATION
11/16/23 1231   Individualization/Patient Specific Goals   Patient Personal Strengths expressive of emotions;expressive of needs;coping skills   Patient Vulnerabilities family/relationship conflict   Anxieties, Fears or Concerns Pt reports ongoing suicidal ideation without a plan. Pt reports she does not feel safe returning home where she lives alone.   Individualized Care Needs Pt would benefit from therapeutic groups to learn healthy coping skills and emotional regulation. Pt would benefit from medication management support.   Interprofessional Rounds   Summary Pt was inpatient in October 2023 and has chronic suicidal ideation. Pt recently overdosed on lithium and trazodone which led to previous hospitalization. Pt currently presents with suicidal ideation without a plan.   Participants CTC;psychiatrist;nursing;OT   Behavioral Team Discussion   Participants Hayes Simpson MD; Carmel Black RN; PARADISE Aguilar; Radha Morton OTR/L; PA student   Progress Pt was recently admitted and would benefit from medication management and therapeutic groups in the milieu.   Anticipated length of stay 2-3 days   Continued Stay Criteria/Rationale N/A   Medical/Physical No issues noted   Precautions Suicide precautions   Plan Multidisciplinary team evaluation, medication management, care coordination   Rationale for change in precautions or plan N/A   Safety Plan Per unit protocol   Anticipated Discharge Disposition home or self-care;IRTS     PRECAUTIONS AND SAFETY    Behavioral Orders   Procedures    Code 1 - Restrict to Unit    Routine Programming     As clinically indicated    Status 15     Every 15 minutes.    Suicide precautions     Patients on Suicide Precautions should have a Combination Diet ordered that includes a Diet selection(s) AND a Behavioral Tray selection for Safe Tray - with utensils, or Safe Tray - NO utensils

## 2023-11-16 NOTE — PROGRESS NOTES
Uneventful evening- pt slept and took meds w/o issue. Cooperative. Able to make needs known. Sitter pulled at 0400, pt remains voluntary w/o plan for self harm.   Carmencita Vega RN on 11/16/2023 at 6:40 AM

## 2023-11-16 NOTE — H&P
[facility], [place]   Psychiatric History and Physical.    Chief complaint and reason for admission:     Depression and Suicidal ideation.    History of present illness: per DEC 's note: Sveta Cuellar presents to the ED via EMS. Patient is presenting to the ED for the following concerns: Suicidal ideation, Intoxication.   Factors that make the mental health crisis life threatening or complex are:  Pt out of IP  about a week; new meds were started when pt went to [facility] since she had taken excess of Rx Trazadone; patient ingested alcohol tonight.   Pt said she did not have a plan to end life but does ask multiple times if she's going back into IP .  She says she does not feel safe to go home at time of note. Pt says she found out today that a significant other does not take relationship as seriously as she and some additional upsetting information.  She called a friend and decided she needed to come to ED as she had suicidal thoughts. Pt reports no plan and that she thinks she needs an IRTS as living alone doesn't seem to allow the appropriate level of care patient needs to stay emotionally regulated and safe.  Pt has PMH dx to include MDD BPD and PTSD.   Pt has hx of housing instabilty but reports she has had her apartment since January of this year.   Pt's therapist recommended pt think about IRTS level of care and pt agrees she needs a higher level of care going forward.   As noted above, pt states she does not feel safe to leave ED at this time.  Since she is forward thinking, has no plan and did consume alcohol, patient will be administered her meds, allowed to sleep in ED and reassessed for discharge option in the morning.     During visit with this provider patient presented as pleasant and cooperative. She was soft spoken with limited eye contact and looked depressed and anxious. She confirmed that she found out that her SO had a relationship with Sveta's friend,  felt betrayed and started thinking of suicide. Sveta reported disrupted sleep, but minimal changes in her appetite. Admitted having frequent thoughts of suicide that she revealed to her providers. Her therapist suggested that she gets hospitalized for stabilization and, then, goes to IR. Sveta agreed with her. Patient reports recent hospitalization on a mental floor in [facility] after a Suicide attempt with medication overdose. States that discharge was only 2-3 weeks ago. She does have providers: psychiatric prescriber, regular and DBT therapists (patient participates in virtual DBT groups 3 times per week). She reports being fully compliant with her meds. Admits to smoking weed 2-3 times per week and denies using heavy drugs or abusing alcohol (patient's UDS was negative for all screened substances).     Past psychiatric history: states that she was diagnosed with PTSD, depression and TIGIST. Per chart review she was also diagnosed with Borderline personality disorder. Chart review also states that patient is binge drinking? Reports at least 5 Suicide attempts and multiple psychiatric hospitalizations. History of Self injurious behavior by cutting self. Reports significant anxiety, denied history of having panic attacks recently, although, had them in the past.     Past medical history:   Patient Active Problem List     Diagnosis Date Noted    Borderline personality disorder (H) 11/15/2023       Priority: Medium    Major depressive disorder, recurrent episode, moderate (H) 10/21/2023       Priority: Medium    Disassociation 12/05/2019       Priority: Medium       Associated with PTSD symptoms       Encounter for triage in pregnant patient 10/31/2019       Priority: Medium    Urinary tract infection in mother during third trimester of pregnancy 10/13/2019       Priority: Medium    Housing lack 09/24/2019       Priority: Medium    Prenatal care in third trimester 09/20/2019       Priority: Medium        CESAR from [facility] at 29 weeks   All labs including GCT done at [facility]     9/24/19 patient living in crisis shelter, needs to be out this week.   Care coordination referral.   Growth U/S 10/18/19: Cephalic fetus.  EFW 5 pounds 4 ounces, 2369 g, 63rd growth percentile.  RAMESH normal, 12.3.       PTSD (post-traumatic stress disorder) 09/20/2019       Priority: Medium       Self reported.  Has services        Depression 09/20/2019       Priority: Medium       Self reported, has services   9/24/19 pt states on meds?  Possible bipolar diagnosis?     Past Surgical History:   Procedure Laterality Date    GI SURGERY         Family and social history: single, never . Originally from [place]. Mother still lives there, father in [place]. Patient moved here few years ago to be closer to her son. She has 2 sisters in [place], [place] who she is not close to and a sister in [place] with whom she maintains relationship. The patient has been living in her own apartment in [place] for the past 3 months. Prior to that, she was in an [employer] [facility]. She was living with her sister prior to that. She recently started having visits with her 3-year-old son who she gave up for adoption just after he was born. She has a GED and has worked for [employer], but is not currently working because she is lacking transportation. On sliding scale insulin. She says that her main support is her mother, but she is not always available to her. She recently made a friend in her apartment building and cites this person as a significant support.     The patient's brother has schizophrenia, her mother has had chemical dependence problems, her nephew committed suicide 3 years ago          Medications:      atorvastatin  20 mg Oral At Bedtime    [START ON 11/17/2023] DULoxetine  60 mg Oral Daily    metFORMIN  500 mg Oral Daily    risperiDONE  0.5 mg Oral At Bedtime          Allergies:     Allergies   Allergen Reactions    Wellbutrin [Bupropion]      Nausea and  dizziness          Labs:   No results found for this or any previous visit (from the past 24 hour(s)).       Psychiatric Examination:     /83   Pulse 114   Temp 97.5  F (36.4  C) (Oral)   SpO2 93%   Weight is 0 lbs 0 oz  There is no height or weight on file to calculate BMI.    Appearance: awake, alert, dressed in hospital scrubs, and mildly obese  Attitude:  cooperative  Eye Contact:  poor   Mood:  anxious and depressed  Affect:  mood congruent and constricted mobility  Speech:  decreased prosody and paucity of speech  Psychomotor Behavior:  no evidence of tardive dyskinesia, dystonia, or tics and physical retardation  Throught Process:  logical, linear, and goal oriented  Associations:  no loose associations  Thought Content:  no evidence of psychotic thought and passive suicidal ideation present  Insight:  partial  Judgement:  fair  Oriented to:  time, person, and place  Attention Span and Concentration:  fair  Recent and Remote Memory:  fair       Review of systems:     For physical examination and 12 point review of system please, see note of Dr. Powell from 11/14/23.  I reviewed that note and agree with it.         DIagnoses:     1. Major depressive disorder, recurrent, severe without psychotic features.  2. Generalized anxiety disorder.  3. Borderline personality disorder by history.   4. PTSD.         Plan:     Patient agrees to stay at this facility voluntarily with hopes to go to IRTS afterwards. She agreed with recommendation to increase her Cymbalta dose. Will continue the rest of her PTA meds. Will meet with CTC to discuss post discharge planning. Will continue to provide support and structure.      Total time spent was 57 minutes. Over 50% of times was spent counseling and coordination of care regarding coping skills, medication and discharge planning.

## 2023-11-16 NOTE — CARE PLAN
11/16/23 1203   Patient Belongings   Did you bring any home meds/supplements to the hospital?  No   Patient Belongings locker   Patient Belongings Put in Hospital Secure Location (Security or Locker, etc.) glasses;clothing;shoes;wallet   Belongings Search Yes   Clothing Search Yes   Second Staff Carmel       Items in pt locker: shoe, phone, jacket, bra, 3 pants, 4 shirts, sweatshirt, 3 socks, glove, 3 underwear, slipper, eyeglasses, deodorant, 5 keys, white purse, pj pants, hair brush, eye lubricate, , tooth paste, insurance card, DL,       Items send to security: EBT card, $ 10 dollars, disciver debit card [id number], SSI        ......A               Admission:  I am responsible for any personal items that are not sent to the safe or pharmacy.  [facility] is not responsible for loss, theft or damage of any property in my possession.    Signature:  _________________________________ Date: _______  Time: _____                                              Staff Signature:  ____________________________ Date: ________  Time: _____      2nd Staff person, if patient is unable/unwilling to sign:    Signature: ________________________________ Date: ________  Time: _____     Discharge:  [facility] has returned all of my personal belongings:    Signature: _________________________________ Date: ________  Time: _____                                          Staff Signature:  ____________________________ Date: ________  Time: _____

## 2023-11-16 NOTE — PLAN OF CARE
Goal Outcome Evaluation:    Initial meeting note:    Therapist introduced self to patient and discussed psychotherapy service available to patient.     Pt response: Pt not interested currently in meeting 1:1; therapist will continue remaining available for pt     Plan: Pt was encouraged to attend groups and therapist will remain available for 1:1 sessions    Duration: Met with patient a for a total of 2 minutes.

## 2023-11-16 NOTE — ED NOTES
Assumed care of pt. Pt updated on plan of care, planned transfer to [facility] this AM. Pt offered phone to notify friends/family, pt declined. Pt cooperative. Breakfast tray arrival pending.

## 2023-11-16 NOTE — CARE PLAN
INITIAL OCCUPATIONAL THERAPY NOTE     11/16/23 1154   Group Therapy Session   Group Attendance attended group session   Time Session Began 1015   Time Session Ended 1100   Total Time (minutes) 45   Total # Attendees 5   Group Type life skill;task skill;other (see comments)  (OT)   Group Topic Covered balanced lifestyle;coping skills/lifestyle management;leisure exploration/use of leisure time;structured socialization   Group Session Detail OT Clinic: Activity group for creative expression, promoting autonomy, building self worth, coping with stress and symptoms, and an opportunity to exercise cognitive skills (I.e. initiation, planning, organizing, sequencing, sustained attention, follow through, and overall self awareness).   Patient Response/Contribution able to recall/repeat info presented;cooperative with task;discussed personal experience with topic;expressed understanding of topic   Patient Participation Detail Pt was just admitted to unit yet did come to group when staff introduced self and encouraged her to do so. She presented with flat affect and quiet and brief responses, yet was pleasant. She did note interest in craft type activities and chose an unfamiliar type. Utilized written directions and able to complete steps. She did accept compliments from others, along with briefly interacting and sharing when asked direct question. Will continue to encourage attendance and assess.

## 2023-11-16 NOTE — TELEPHONE ENCOUNTER
01:45 - Left message for Array provider. Awaiting CB  02:48 - Dr. Hawley accepts for MH admission. Placed pt in queue for 30NB    R: 30 / [facility]    02:54 - Called unit; RN unavailable. PPS will call back later  03:36 - Notified unit RN. Unit short staffed overnight. RN-RN on day shift  03:37 - Notified ED of placement and that pt will have a roommate    Indicia completed

## 2023-11-16 NOTE — ED NOTES
Writer gave report to Yudy, Charge RN at station [facility] of [facility]. She stated that she will be assuming care for this pt, so she asked that pt does not arrive earlier than 0930, at which time she will have completed her charge meetings. SC[facility] notified. [facility] will call when they have ETA.

## 2023-11-16 NOTE — PHARMACY-ADMISSION MEDICATION HISTORY
Please see Admission Medication History completed on 11/15/2023 by medication scribe under previous encounter at [facility] Emergency Department for information regarding prior to admission medications.    Nkechi Dudley, PharmD
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
## 2023-11-17 LAB
CHOLEST SERPL-MCNC: 215 MG/DL
HBA1C MFR BLD: 6 %
HDLC SERPL-MCNC: 32 MG/DL
LDLC SERPL CALC-MCNC: 132 MG/DL
NONHDLC SERPL-MCNC: 183 MG/DL
TRIGL SERPL-MCNC: 253 MG/DL

## 2023-11-17 PROCEDURE — 124N000002 HC R&B MH UMMC

## 2023-11-17 PROCEDURE — 90853 GROUP PSYCHOTHERAPY: CPT

## 2023-11-17 PROCEDURE — 99232 SBSQ HOSP IP/OBS MODERATE 35: CPT | Performed by: PSYCHIATRY & NEUROLOGY

## 2023-11-17 PROCEDURE — 36415 COLL VENOUS BLD VENIPUNCTURE: CPT | Performed by: PSYCHIATRY & NEUROLOGY

## 2023-11-17 PROCEDURE — 250N000013 HC RX MED GY IP 250 OP 250 PS 637: Performed by: PSYCHIATRY & NEUROLOGY

## 2023-11-17 PROCEDURE — G0177 OPPS/PHP; TRAIN & EDUC SERV: HCPCS

## 2023-11-17 PROCEDURE — 80061 LIPID PANEL: CPT | Performed by: PSYCHIATRY & NEUROLOGY

## 2023-11-17 PROCEDURE — 83036 HEMOGLOBIN GLYCOSYLATED A1C: CPT | Performed by: PSYCHIATRY & NEUROLOGY

## 2023-11-17 PROCEDURE — 90837 PSYTX W PT 60 MINUTES: CPT

## 2023-11-17 RX ADMIN — DULOXETINE HYDROCHLORIDE 60 MG: 60 CAPSULE, DELAYED RELEASE ORAL at 08:08

## 2023-11-17 RX ADMIN — RISPERIDONE 0.5 MG: 0.5 TABLET, ORALLY DISINTEGRATING ORAL at 21:48

## 2023-11-17 RX ADMIN — ATORVASTATIN CALCIUM 20 MG: 20 TABLET, FILM COATED ORAL at 21:48

## 2023-11-17 RX ADMIN — HYDROXYZINE HYDROCHLORIDE 50 MG: 25 TABLET ORAL at 16:16

## 2023-11-17 RX ADMIN — ACETAMINOPHEN 650 MG: 325 TABLET, FILM COATED ORAL at 19:06

## 2023-11-17 RX ADMIN — HYDROXYZINE HYDROCHLORIDE 50 MG: 25 TABLET ORAL at 03:23

## 2023-11-17 RX ADMIN — METFORMIN HYDROCHLORIDE 500 MG: 500 TABLET, EXTENDED RELEASE ORAL at 18:03

## 2023-11-17 RX ADMIN — Medication 3 MG: at 21:48

## 2023-11-17 ASSESSMENT — ACTIVITIES OF DAILY LIVING (ADL)
ADLS_ACUITY_SCORE: 29
ADLS_ACUITY_SCORE: 29
ORAL_HYGIENE: INDEPENDENT
LAUNDRY: WITH SUPERVISION
ADLS_ACUITY_SCORE: 29
LAUNDRY: WITH SUPERVISION
HYGIENE/GROOMING: INDEPENDENT
ADLS_ACUITY_SCORE: 29
HYGIENE/GROOMING: INDEPENDENT
ADLS_ACUITY_SCORE: 29
DRESS: INDEPENDENT
ADLS_ACUITY_SCORE: 29
DRESS: INDEPENDENT
ORAL_HYGIENE: INDEPENDENT

## 2023-11-17 NOTE — PLAN OF CARE
End of shift report:    Patient slept for 5.75 hours of the shift with no s/s of acute distress. The patient was noted to be breathing with ease during safety rounds. Patient experienced no safety events or escalations during the shift. Will continue to monitor.          PRN's:    No prn medications given.          Medical:     Pt remained NPO except for water due to pending lab draw.

## 2023-11-17 NOTE — CARE PLAN
OCCUPATIONAL THERAPY GROUP NOTE:     11/16/23 1800   Group Therapy Session   Group Attendance attended group session   Time Session Began 1715   Time Session Ended 1745   Total Time (minutes) 30   Total # Attendees 6   Group Type recreation   Group Topic Covered structured socialization;leisure exploration/use of leisure time;problem-solving;coping skills/lifestyle management   Group Session Detail OT Leisure Group   Patient Response/Contribution cooperative with task;listened actively;organized;pleasant;engaged;congruent affect   Patient Participation Detail Pt actively participated in a structured occupational therapy group with a focus on visuospatial problem solving and social engagement via a group game. Pt demonstrated understanding of the novel 3-step task after an initial explanation. Pt remained focused and engaged throughout the full duration of group.

## 2023-11-17 NOTE — CARE PLAN
11/17/23 1534   Group Therapy Session   Group Attendance attended group session   Time Session Began 1015   Time Session Ended 1100   Total Time (minutes) 45   Total # Attendees 7   Group Type life skill;task skill;other (see comments)  (OT)   Group Topic Covered balanced lifestyle;coping skills/lifestyle management;leisure exploration/use of leisure time;structured socialization   Group Session Detail OT Clinic: Activity group for creative expression, promoting autonomy, building self worth, coping with stress and symptoms, and an opportunity to exercise cognitive skills (I.e. initiation, planning, organizing, sequencing, sustained attention, follow through, and overall self awareness).   Patient Response/Contribution able to recall/repeat info presented;cooperative with task;discussed personal experience with topic;expressed understanding of topic   Patient Participation Detail Pt presented with flat affect and tended to wait for staff to initiate her project. She was able to complete the step, focusing on fine detail. She asked to begin a new project and chose a more complex project. Worked independently and quietly. Notes she engages in craft activities at home. See also BEH Rehab flowsheet

## 2023-11-17 NOTE — PROGRESS NOTES
"[facility] [place]   Psychiatric Progress Note        Interim History:   The patient's care was discussed with the treatment team during the daily team meeting and/or staff's chart notes were reviewed. Staff report patient slept 6.75 hours last night. She requests PRN meds for anxiety and sleep. She remains with flat affect and depressed mood, but has been attending group and working on a puzzle, see RN's note below from last evening:    \"Patient presents with a flat/blunted and tearful affect. Reports mood as \"Okay.\" Is pleasant, polite, calm, and cooperative. Thought content presents as logical and relevant. Thought process presents as logical and relevant. Speech is clear and coherent. Eye contact is good. Patient rates depression at a #8/10 and anxiety at a #5/10. Requested PRN Hydroxyzine x1 for anxiety. Denies SIB, homicidal ideation, and auditory/visual hallucinations. Patient endorses \"Some\" suicidal ideation. Ann for safety here in the hospital. No medical issues noted. Vital signs stable. Medication compliant. Patient requested and received PRN Melatonin for sleep. Attended group this shift. Ate evening meal. Patient out in the milieu occasionally this shift working on a puzzle in the patient dining room. No interaction with staff and peers noted.      Patient became tearful this shift talking about her childhood, lifetime of depression, and multiple hospitalizations. States that she doesn't ever feel like she is going to get past the depression. States that every year she ends up in the hospital due to her depression, and is frustrated. This writer provided emotional support, empathetic listening, reassurance, and encouragement to patient. This writer also provided patient with a stress ball and sound machine for comfort. Patient was appreciative.\"     Met with patient: patient was seen in conference room in presence of PA student. She was soft spoken with limited eye " contact and looked depressed and anxious. She continues to be compliant with medications. When asked about suicidal ideations, she reports they are still present. She continues to be in agreement about going to IRTS and would not like to live alone as she was before as she would not feel safe there. We discussed recent med changes, she denied having any side effects. We agreed not to make any med changes today. She did not have any questions or concerns for us today.         Medications:      atorvastatin  20 mg Oral At Bedtime    DULoxetine  60 mg Oral Daily    metFORMIN  500 mg Oral Daily    risperiDONE  0.5 mg Oral At Bedtime          Allergies:     Allergies   Allergen Reactions    Wellbutrin [Bupropion]      Nausea and dizziness          Labs:     Recent Results (from the past 24 hour(s))   Hemoglobin A1c    Collection Time: 11/17/23  7:57 AM   Result Value Ref Range    Hemoglobin A1C 6.0 (H) <5.7 %   Lipid panel    Collection Time: 11/17/23  7:57 AM   Result Value Ref Range    Cholesterol 215 (H) <200 mg/dL    Triglycerides 253 (H) <150 mg/dL    Direct Measure HDL 32 (L) >=50 mg/dL    LDL Cholesterol Calculated 132 (H) <=100 mg/dL    Non HDL Cholesterol 183 (H) <130 mg/dL          Psychiatric Examination:     /79 (BP Location: Left arm)   Pulse 91   Temp 97.9  F (36.6  C) (Temporal)   SpO2 94%   Weight is 0 lbs 0 oz  There is no height or weight on file to calculate BMI.    Orthostatic Vitals         Most Recent      Standing Orthostatic /79 11/17 0842    Standing Orthostatic Pulse (bpm) 105 11/17 0842          Appearance: awake, alert, dressed in home clothes, and mildly obese  Attitude: cooperative  Eye Contact:  poor   Mood: anxious and depressed  Affect: mood congruent and constricted mobility  Speech: decreased prosody and paucity of speech  Psychomotor Behavior: no evidence of tardive dyskinesia, dystonia, or tics and physical retardation  Throught Process: logical, linear, and goal  oriented  Associations: no loose associations  Thought Content: no evidence of psychotic thought and passive suicidal ideation present  Insight: partial  Judgement: fair  Oriented to: time, person, and place  Attention Span and Concentration: fair  Recent and Remote Memory: fair    Clinical Global Impressions  First: 6/4 11/17/2023      Most recent:            Precautions:     Behavioral Orders   Procedures    Code 1 - Restrict to Unit    Routine Programming     As clinically indicated    Status 15     Every 15 minutes.    Suicide precautions     Patients on Suicide Precautions should have a Combination Diet ordered that includes a Diet selection(s) AND a Behavioral Tray selection for Safe Tray - with utensils, or Safe Tray - NO utensils            DIagnoses:     1. Major depressive disorder, recurrent, severe without psychotic features.  2. Generalized anxiety disorder.  3. Borderline personality disorder by history.   4. PTSD.         Plan:   Patient agrees to stay at this facility voluntarily with hopes to go to IRTS afterwards. Cymbalta was increased to 60 mg daily on 11/16/2023 which she was agreeable with. No medication changes today 11/17/2023. Will continue the rest of her PTA meds. Will meet with [facility] to discuss post discharge planning. Will continue to provide support and structure.     I was present with PA student who participated in the service and in the documentation of the note. I have verified the history and personally performed the physical exam and medical decision making. I agree with the assessment and plan of care as documented in the note.     Hayes Simpson MD  [facility] Psychiatry       Total time spent was 35 minutes. Over 50% of times was spent counseling and coordination of care regarding coping skills, medication and discharge planning.

## 2023-11-17 NOTE — CARE PLAN
"   11/17/23 1538   Group Therapy Session   Group Attendance attended group session   Time Session Began 1115   Time Session Ended 1200   Total Time (minutes) 45   Total # Attendees 3   Group Type life skill;other (see comments)  (OT)   Group Topic Covered balanced lifestyle;emotions/expression;coping skills/lifestyle management   Group Session Detail OT - Coping:  Focus of group is on identification of strategies to help manage mental health with categories of activities, emotions management, and cognitive techniques using the acronym ACCEPTS.    Patient Response/Contribution able to recall/repeat info presented;cooperative with task;discussed personal experience with topic;expressed understanding of topic   Patient Participation Detail Pt was engaged in group and did note familiarity with concept as she is currently in DBT. Did share having goals to try to volunteer, noting particular types of things due to \"having social anxiety\". Focused on skills she uses that has her focused on doing things for her son, who was adopted to another family, yet a part of her life.        "

## 2023-11-17 NOTE — PLAN OF CARE
"  Problem: Adult Behavioral Health Plan of Care  Goal: Develops/Participates in Therapeutic [place] to Support Successful Transition  Intervention: Foster Therapeutic [place]  Recent Flowsheet Documentation  Taken 11/17/2023 1000 by Carmel Black RN  Trust Relationship/Rapport: emotional support provided   Goal Outcome Evaluation:    Plan of Care Reviewed With: patient                 Goal for this shift: To attend all groups.             Presentation: Patient is observed in the milieu dressed in scrubs.  Patient has a flat and blunted affect.  Speech is quiet, clear, and coherent.  Thoughts are logical.  Mood appears calm and patient is cooperative.       Mental health symptoms: Patient denies SIB, HI, and hallucinations.  Endorses \"fleeting thoughts of SI.\" No plan. Not as bad as before admission.  Not at patient baseline.  Patient rates her depression 7/10 and anxiety 6/10.       Medication compliance: Patient is compliant with all medications.        Medical Concerns: Patient has no medical concerns.        PRN's: None      Precautions: suicide    Continue with plan of care        "

## 2023-11-17 NOTE — PLAN OF CARE
BEH IP Unit Acuity Rating Score (UARS)  Patient is given one point for every criteria they meet.    CRITERIA SCORING   On a 72 hour hold, court hold, committed, stay of commitment, or revocation 0/1    Patient LOS on BEH unit exceeds 20 days 0/1  LOS: 1   Patient under guardianship, 55+, otherwise medically complex, or under age 11 0/1   Suicide ideation without relief of precipitating factors 1/1   Current plan for suicide 0/1   Current plan for homicide 0/1   Imminent risk or actual attempt to seriously harm another without relief of factors precipitating the attempt 0/1   Severe dysfunction in daily living (ex: complete neglect for self care, extreme disruption in vegetative function, extreme deterioration in social interactions) 0/1   Recent (last 7 days) or current physical aggression in the ED or on unit 0/1   Restraints or seclusion episode in past 72 hours 0/1   Recent (last 7 days) or current verbal aggression, agitation, yelling, etc., while in the ED or unit 0/1   Active psychosis 0/1   Need for constant or near constant redirection (from leaving, from others, etc).  0/1   Intrusive or disruptive behaviors 0/1   TOTAL 1

## 2023-11-17 NOTE — PLAN OF CARE
"Team Note Due:  Thursday    Assessment/Intervention/Current Symtoms and Care Coordination:  Chart review and met with team, discussed pt progress, symptomology, and response to treatment.  Discussed the discharge plan and any potential impediments to discharge.    Tasks Completed:  - Met with pt to identify goals of hospitalization and discharge plans. Pt is reporting she would like to go to IRTS. Pt reports she lives alone and she is scared to go home because she doesn't have the support there. Pt reports having good days and bad days. CTC assisted pt in identifying what contributes to good days and bad days. Pt reports that when she is \"winging it\" and doesn't have plans that day, she has bad days because she ruminates on the past more. On good days, pt reports she doesn't spend as much time focused on the time, is more present, and has more things to do. Pt identifies loneliness as a trigger and reports feeling safer at an IRTS or in the hospital because she always has someone to check-in with when feeling depressed. CTC expressed concerns about IRTS as a short-term placement and that it would not take care of the root issue (loneliness and difficulty coping). Pt reports she currently has a therapist, attends DBT group three times per week (9-12 Monday, Tuesday, and Thursday), a psychiatrist, and a county . However, her appointments are all online and she meets with her  one time monthly. [facility] assessed pt's ability to focus during online meetings and pt admitted that it can be challenging to focus during the groups as she is tired in the morning and she often turns her camera off and takes breaks during group. [facility] asked about the possibility of in-person appointments to give pt something to do during the day and provide an opportunity for connection outside of the home. Pt reports she has social anxiety and is unable to do in-person, as a result. [facility] discussed options such as getting a " roommate, [facility] worker, DBT adherent programs (with individual, group, and on-call support) as long-term support for pt. During conversation with CTC, pt became tearful and struggled with future-focused thinking. CTC assisted pt with identifying a coping skill she could lean on to support her in the moment as she reported feeling overwhelmed. Pt identified writing poetry. CTC and pt will continue to meet and assess pt needs and to develop safe disposition plan.     Discharge Plan or Goal:  Current plan is to stabilize symptoms and develop safe disposition plan. Following hospitalization, plan is to discharge home with outpatient support or to an IRTS.     Barriers to Discharge:  Pt was recently admitted and continues to stabilize.     Referral Status:  Pt currently has outpatient providers that support her. Will schedule follow-up appointments closer to discharge.     Legal Status:  Pt is voluntary    Contacts:  Medication Management/Psychiatry:  Name/Clinic: Nkechi Hendrix NP  [facility] *Pt attends via telehealth*  Address: [address] [place] [address]   Phone: [phone]   Fax: [phone]      Therapist:   Name/Clinic: [facility]   Address: [address] [place] [address] , [place] [address] *Pt attends via telehealth*  Number: [phone]     DBT Groups:  [facility]: [facility] *Pt attends via telehealth*  Schedule: 9am - 12pm Monday, Tuesday, Thursday  Phone: [phone]    /ACT Team:  [facility]  or [facility] [phone]   Number: Unknown     Upcoming Meetings and Dates/Important Information and next steps:  None

## 2023-11-17 NOTE — PLAN OF CARE
"Goal Outcome Evaluation:    Patient presents with a flat to sad and somber affect. Reports mood as \"Okay.\" Is pleasant, polite, calm, and cooperative. Thought content presents as logical and relevant. Thought process presents as logical and relevant. Speech is clear and coherent. Eye contact is good. Patient rates depression at a #7/10 and anxiety at a #8/10. Requested and received PRN Hydroxyzine x1 for anxiety. Denies SIB, homicidal ideation, and auditory/visual hallucinations. Patient endorses \"Fleeting\" suicidal ideation. Ann for safety here in the hospital. No medical issues noted. Vital signs stable. Medication compliant. Patient requested and received PRN Tylenol x1 for c/o #8/10 headache pain, and PRN Melatonin for sleep. Attended group this shift. Ate evening meal. Patient out in the milieu this shift working on a puzzle in the patient dining room; and watching T.V. and movies. No interaction with staff and peers noted.     /81   Pulse 97   Temp 97.7  F (36.5  C) (Temporal)   SpO2 100%                    "

## 2023-11-18 PROCEDURE — G0177 OPPS/PHP; TRAIN & EDUC SERV: HCPCS

## 2023-11-18 PROCEDURE — 250N000013 HC RX MED GY IP 250 OP 250 PS 637: Performed by: PSYCHIATRY & NEUROLOGY

## 2023-11-18 PROCEDURE — 124N000002 HC R&B MH UMMC

## 2023-11-18 RX ADMIN — DULOXETINE HYDROCHLORIDE 60 MG: 60 CAPSULE, DELAYED RELEASE ORAL at 08:12

## 2023-11-18 RX ADMIN — HYDROXYZINE HYDROCHLORIDE 50 MG: 25 TABLET ORAL at 20:14

## 2023-11-18 RX ADMIN — RISPERIDONE 0.5 MG: 0.5 TABLET, ORALLY DISINTEGRATING ORAL at 20:53

## 2023-11-18 RX ADMIN — ATORVASTATIN CALCIUM 20 MG: 20 TABLET, FILM COATED ORAL at 20:53

## 2023-11-18 RX ADMIN — Medication 3 MG: at 20:53

## 2023-11-18 RX ADMIN — METFORMIN HYDROCHLORIDE 500 MG: 500 TABLET, EXTENDED RELEASE ORAL at 18:16

## 2023-11-18 ASSESSMENT — ACTIVITIES OF DAILY LIVING (ADL)
LAUNDRY: WITH SUPERVISION
HYGIENE/GROOMING: INDEPENDENT
ADLS_ACUITY_SCORE: 29
ORAL_HYGIENE: INDEPENDENT
DRESS: INDEPENDENT
ADLS_ACUITY_SCORE: 29
ADLS_ACUITY_SCORE: 29
HYGIENE/GROOMING: INDEPENDENT
LAUNDRY: WITH SUPERVISION
DRESS: INDEPENDENT
ADLS_ACUITY_SCORE: 29
ORAL_HYGIENE: INDEPENDENT

## 2023-11-18 NOTE — PLAN OF CARE
"Goal Outcome Evaluation:      Duration: Met with patient for a total of 45 minutes.    Time start: 12:15 pm  Time end 1:00 pm    Modality Used:DBT, Structural Family, Rapport Building, and Solution Focused    Goals: Meet and greet to understand barriers for success in the community/ multiple ER visits etc. DBT to address chronic SI  Self esteem issues, childhood trauma, break up    Pt progress: pt was open to meeting and has decent insight about childhood trauma and feelings about recent break up etc.  However she internalizes and self blames, self esteem issues. Narrative is \" I am broken\". Also feels bad about dishonesty in dating relationships and giving up her son for adoption, although it is an open adoption which she is pleased about.    Treatment Objective(s) Addressed:   The focus of this session was on rapport building, orienting the patient to therapy, identifying and practicing coping strategies, processing feelings related to recent break up leading to this hospitalization, building distress tolerance, assessing safety, building self-esteem, exploring obstacles to safety in the community, and building skills in DBT distress tolerance and emotional regulation.      Progress Towards Goals and Assessment of Patient:   Patient reports improving symptoms. Patient is making progress towards treatment goals as evidenced by insight, willingness to talk to writer honestly.     Therapeutic Intervention(s):   Provided active listening, unconditional positive regard, and validation. Engaged in cognitive restructuring/ reframing, looked at common cognitive distortions and challenged negative thoughts. Engaged in guided discovery, explored patient's perspectives and helped expand them through socratic dialogue. Taught the link between thoughts, feelings, and behaviors. Identified and practiced coping skills. Introduced and practiced urge surfing. Explored motivation for behavioral change. Explored barriers to success " in community.    Plan/next step: meet on Monday, she is going to work on puzzles over the weekend, socializing and writing poetry to express feelings and to share with writer next week.

## 2023-11-18 NOTE — PLAN OF CARE
"Goal Outcome Evaluation:    Patient presents with a flat affect. Reports mood as \"Okay.\" Is pleasant, polite, calm, and cooperative. Thought content presents as relevant and perseverating. Patient was perseverating regarding her past childhood trauma, her depression, her boyfriend \"Cheating on me when he said we were exclusive\", and what's going to happen when she discharges. States the she does not want to live by herself. Thought process presents as logical and relevant. Speech is clear and coherent. Eye contact is good. Patient rates depression at a #8/10 and anxiety as \"A little.\" Patient requested and received PRN Hydroxyzine x1 for anxiety and racing thoughts. PRN Hydroxyzine was effective; AEB, patient verbalized a reduction in her anxiety and racing thoughts. Denies suicidal ideation, SIB, homicidal ideation, and auditory/visual hallucinations. Ann for safety here in the hospital. No medical issues noted. Vital signs stable. Medication compliant. Patient requested and received PRN Melatonin for sleep. Attended group this shift. Ate evening meal. Patient out in the milieu this shift working on a puzzle in the patient dining room; and watching [employer] and movies. No interaction with staff and peers noted.     BP (!) 154/89   Pulse 105   Temp 98.2  F (36.8  C) (Temporal)   SpO2 95%                     "

## 2023-11-18 NOTE — CARE PLAN
11/17/23 2130   Group Therapy Session   Group Attendance attended group session   Time Session Began 0710   Time Session Ended 1800   Total Time (minutes) 45   Total # Attendees 5   Group Type psychotherapeutic   Group Topic Covered cognitive therapy techniques;structured socialization;emotions/expression   Group Session Detail DBT Mindfulness/ [employer]   Patient Response/Contribution cooperative with task;listened actively   Patient Participation Detail mood calm, was happy to have gotten her belongings later this afternoon reported. She is attentive and open to learning in group.

## 2023-11-18 NOTE — PROGRESS NOTES
11/18/23 1455   Group Therapy Session   Group Attendance attended group session   Time Session Began 1115   Time Session Ended 1200   Total Time (minutes) 45   Total # Attendees 2   Group Type recreation   Group Session Detail Education provided on the importance of healthy leisure with hands on leisure exploration playing Skip Marv for concentration, new learning, following directions, sequencing, logic and strategy skill practice, simple problem solving, coping, reality-based activity, mood stabilization, an opportunity to experience success and expansion of social skills.   Patient Participation Detail Pt was pleasant and engaged in an activity she stated she was familiar with.  She demonstrated good frustration tolerance with a peer who was struggling and a bit argumentative as a result.  She offered kind words and excuses for their behavior.  Pt was excited to learn that Skip Marv could also be played on a phone tarah.  She stated she would like to check it out once she discharges as she pays money for some of the other games she plays and she needs more healthy leisure activities.to fill some of her time.  Pt demonstrated good sportsmanship throughout.

## 2023-11-18 NOTE — PLAN OF CARE
Problem: Adult Behavioral Health Plan of Care  Goal: Plan of Care Review  Recent Flowsheet Documentation  Taken 11/18/2023 1200 by Carmel Black RN  Patient Agreement with Plan of Care: agrees   Goal Outcome Evaluation:    Plan of Care Reviewed With: patient               Presentation: Patient is observed in the lounge at the beginning of the shift.  Patient is sitting in a rocking watching tv and observing the unit.  Patient is dressed in scrubs and is approachable.  Patient has a flat and blunted affect.  Speech is clear and coherent.  Mood is calm and cooperative.  Thoughts are relevant. Patient attended group this morning. Writer and patient discussed self care and self acceptance.     Mental health symptoms: Patient continues to report fleeting thoughts of SI without a plan.  Denies thoughts of SIB, HI, and hallucinations.  Patient reports her depression and anxiety is better.  Patient stated she got adequate sleep last night.         Medication compliance: Patient is compliant with all meds.  Denies questions or concerns.       Medical Concerns: Denies medical concerns.  Denies pain Blood pressure (!) 144/82, pulse 99, temperature 97.7  F (36.5  C), temperature source Oral, SpO2 96%, unknown if currently breastfeeding.      PRN's: None      Precautions:  suicide    Continue with plan of care

## 2023-11-18 NOTE — PLAN OF CARE
Problem: Adult Behavioral Health Plan of Care  Goal: Plan of Care Review  Outcome: Progressing     Problem: Sleep Disturbance  Goal: Adequate Sleep/Rest  Outcome: Progressing   Goal Outcome Evaluation:       Pt appears to be sleeping comfortably with even and non labored respirations during all safety checks. No safety concerns noted. Pt slept for a total of 6.5 hours. Will continue with same plan of care.

## 2023-11-19 PROCEDURE — 250N000013 HC RX MED GY IP 250 OP 250 PS 637: Performed by: PSYCHIATRY & NEUROLOGY

## 2023-11-19 PROCEDURE — 124N000002 HC R&B MH UMMC

## 2023-11-19 RX ADMIN — METFORMIN HYDROCHLORIDE 500 MG: 500 TABLET, EXTENDED RELEASE ORAL at 17:55

## 2023-11-19 RX ADMIN — DULOXETINE HYDROCHLORIDE 60 MG: 60 CAPSULE, DELAYED RELEASE ORAL at 08:08

## 2023-11-19 RX ADMIN — HYDROXYZINE HYDROCHLORIDE 50 MG: 25 TABLET ORAL at 21:27

## 2023-11-19 RX ADMIN — ATORVASTATIN CALCIUM 20 MG: 20 TABLET, FILM COATED ORAL at 21:27

## 2023-11-19 RX ADMIN — RISPERIDONE 0.5 MG: 0.5 TABLET, ORALLY DISINTEGRATING ORAL at 21:27

## 2023-11-19 RX ADMIN — DOCUSATE SODIUM AND SENNOSIDES 1 TABLET: 8.6; 5 TABLET, FILM COATED ORAL at 16:50

## 2023-11-19 RX ADMIN — Medication 3 MG: at 21:27

## 2023-11-19 ASSESSMENT — ACTIVITIES OF DAILY LIVING (ADL)
ADLS_ACUITY_SCORE: 29
LAUNDRY: WITH SUPERVISION
ADLS_ACUITY_SCORE: 29
ORAL_HYGIENE: INDEPENDENT
ADLS_ACUITY_SCORE: 29
ADLS_ACUITY_SCORE: 29
HYGIENE/GROOMING: INDEPENDENT
ADLS_ACUITY_SCORE: 29
DRESS: INDEPENDENT

## 2023-11-19 NOTE — PLAN OF CARE
Problem: Adult Behavioral Health Plan of Care  Goal: Adheres to Safety Considerations for Self and Others  Outcome: Progressing     Problem: Suicidal Behavior  Goal: Suicidal Behavior is Absent or Managed  Outcome: Progressing   Goal Outcome Evaluation:       Patient alert and oriented, calm , and cooperative. Presented a flat affect, denied pain, anxiety, SI/SH, and other psych symptoms. Patient had 5.5 hours of sleep this night. Patient is seated at the lounge with other patients watching television calmly. No other issues noted at this time. Will continue to monitor.

## 2023-11-19 NOTE — PROGRESS NOTES
11/18/23 2133   Group Therapy Session   Group Attendance attended group session   Time Session Began 1700   Time Session Ended 1745   Total Time (minutes) 45   Total # Attendees 6   Group Type task skill;life skill   Group Session Detail Education and group discussion provided on relaxation with hands on practice via [employer] drawing meditation for relaxation, creative expression, concentration, attention to detail, follow through, coping, mood stabilization, reality-based activity, building self esteem, mindfulness, grounding, and quiet socialization.   Patient Participation Detail Pt was pleasant and invested in her project.  She worked independently in a detailed manner.  Pt was quietly social with peers.  She shared that she found the group to be relaxing and asked the therapist where she found the relaxing music.  Pt was given the information as group was ending.

## 2023-11-19 NOTE — PLAN OF CARE
Goal Outcome Evaluation: Patient putting a puzzle with animals as the subject. Stated that she can't take of an animal right now due to her frequent hospitalizations. States that she has an apartment that she likes but feels very isolated which has led to more depression and sadness. Thinks that an IRTS would be good for her and needs that more than her apartment. Has begun to feel more connected again being in the hospital as there are people around.

## 2023-11-20 PROCEDURE — 250N000013 HC RX MED GY IP 250 OP 250 PS 637: Performed by: PSYCHIATRY & NEUROLOGY

## 2023-11-20 PROCEDURE — 99232 SBSQ HOSP IP/OBS MODERATE 35: CPT | Performed by: PSYCHIATRY & NEUROLOGY

## 2023-11-20 PROCEDURE — 124N000002 HC R&B MH UMMC

## 2023-11-20 PROCEDURE — G0177 OPPS/PHP; TRAIN & EDUC SERV: HCPCS

## 2023-11-20 PROCEDURE — 90832 PSYTX W PT 30 MINUTES: CPT

## 2023-11-20 PROCEDURE — 90853 GROUP PSYCHOTHERAPY: CPT

## 2023-11-20 RX ORDER — GABAPENTIN 300 MG/1
300 CAPSULE ORAL AT BEDTIME
Status: DISCONTINUED | OUTPATIENT
Start: 2023-11-20 | End: 2023-11-30 | Stop reason: HOSPADM

## 2023-11-20 RX ADMIN — ATORVASTATIN CALCIUM 20 MG: 20 TABLET, FILM COATED ORAL at 20:53

## 2023-11-20 RX ADMIN — Medication 3 MG: at 20:53

## 2023-11-20 RX ADMIN — DULOXETINE HYDROCHLORIDE 60 MG: 60 CAPSULE, DELAYED RELEASE ORAL at 10:20

## 2023-11-20 RX ADMIN — RISPERIDONE 0.5 MG: 0.5 TABLET, ORALLY DISINTEGRATING ORAL at 20:53

## 2023-11-20 RX ADMIN — HYDROXYZINE HYDROCHLORIDE 50 MG: 25 TABLET ORAL at 03:53

## 2023-11-20 RX ADMIN — GABAPENTIN 300 MG: 300 CAPSULE ORAL at 20:53

## 2023-11-20 RX ADMIN — HYDROXYZINE HYDROCHLORIDE 50 MG: 25 TABLET ORAL at 12:19

## 2023-11-20 RX ADMIN — METFORMIN HYDROCHLORIDE 500 MG: 500 TABLET, EXTENDED RELEASE ORAL at 18:01

## 2023-11-20 ASSESSMENT — ACTIVITIES OF DAILY LIVING (ADL)
ADLS_ACUITY_SCORE: 29
ADLS_ACUITY_SCORE: 29
HYGIENE/GROOMING: INDEPENDENT
DRESS: INDEPENDENT
ADLS_ACUITY_SCORE: 29
ORAL_HYGIENE: INDEPENDENT
ADLS_ACUITY_SCORE: 29
ORAL_HYGIENE: INDEPENDENT
DRESS: SCRUBS (BEHAVIORAL HEALTH)
HYGIENE/GROOMING: INDEPENDENT
LAUNDRY: WITH SUPERVISION
ADLS_ACUITY_SCORE: 29

## 2023-11-20 NOTE — PLAN OF CARE
Problem: Adult Behavioral Health Plan of Care  Goal: Adheres to Safety Considerations for Self and Others  Outcome: Progressing  Goal: Absence of New-Onset Illness or Injury  Outcome: Progressing     Problem: Suicidal Behavior  Goal: Suicidal Behavior is Absent or Managed  Outcome: Progressing   Goal Outcome Evaluation:       Patient alert, oriented, calm, and cooperative with staff, and peers. Patient presented with flat affect, denied pain but acknowledged depression, and anxiety which she rated at 5/10 for both. Slept at the beginning of shift, and woke up at 0353, and stated that she is not able to stay asleep. Patient was seating at the lounge, PRN hydroxyzine 50 mg was administered, and patient went to sleep afterwards. Patient had 5.75 hours of sleep this shift. No other issue noted this shift, Nursing will continue to monitor for needs.

## 2023-11-20 NOTE — PLAN OF CARE
BEH IP Unit Acuity Rating Score (UARS)  Patient is given one point for every criteria they meet.    CRITERIA SCORING   On a 72 hour hold, court hold, committed, stay of commitment, or revocation 0/1    Patient LOS on BEH unit exceeds 20 days 0/1  LOS: 4   Patient under guardianship, 55+, otherwise medically complex, or under age 11 0/1   Suicide ideation without relief of precipitating factors 1/1   Current plan for suicide 0/1   Current plan for homicide 0/1   Imminent risk or actual attempt to seriously harm another without relief of factors precipitating the attempt 0/1   Severe dysfunction in daily living (ex: complete neglect for self care, extreme disruption in vegetative function, extreme deterioration in social interactions) 0/1   Recent (last 7 days) or current physical aggression in the ED or on unit 0/1   Restraints or seclusion episode in past 72 hours 0/1   Recent (last 7 days) or current verbal aggression, agitation, yelling, etc., while in the ED or unit 0/1   Active psychosis 0/1   Need for constant or near constant redirection (from leaving, from others, etc).  0/1   Intrusive or disruptive behaviors 0/1   TOTAL 1

## 2023-11-20 NOTE — PLAN OF CARE
Goal Outcome Evaluation:      Duration: Met with patient for a total of 20 minutes.    Time start: 2:50 pm  Time end  3:10 pm    Modality Used:DBT, Person Centered, and Brief Therapy    Goals: work on confidence for discharging  to home with services in place.   She wants IRTS or group home she reports  Pt progress: progressing slowly, stuck with low  self esteem thoughts    Treatment Objective(s) Addressed:   The focus of this session was on rapport building, identifying and practicing coping strategies, processing feelings related to discharge to home, loneliness, identifying an appropriate aftercare plan, building distress tolerance, and building skills in expression of self through her creative writing. Discussed meeting again to share with writer       Progress Towards Goals and Assessment of Patient:   Patient reports improving symptoms. Patient is making progress towards treatment goals as evidenced by she is insightful to a point, but stuck on that she feels she is unable to care for herself..       Therapeutic Intervention(s):   Provided active listening, unconditional positive regard, and validation. Engaged in guided discovery, explored patient's perspectives and helped expand them through socratic dialogue. Coached on coping techniques/relaxation skills to help improve distress tolerance and managing intense emotions. Taught the link between thoughts, feelings, and behaviors. Provided positive reinforcement for progress towards goals, gains in knowledge, and application of skills previously taught.  Explored strategies for self-soothing.  Explored motivation for improving self esteem and living independently. Explored barriers to safe discharge to home.    Plan/next step: meet again tomorrow to discuss poetry prompts about home, self esteem, loneliness, big feelings.

## 2023-11-20 NOTE — PLAN OF CARE
Goal Outcome Evaluation:    Plan of Care Reviewed With: patient      Problem: Depressive Signs/Symptoms  Goal: Increased Participation and Engagement (Depressive Signs/Symptoms)  Outcome: Progressing  Intervention: Facilitate Participation and Engagement  Recent Flowsheet Documentation  Taken 11/20/2023 1700 by Zoe Kelly RN  Diversional Activity: television     Patient presents with flat blunted affect. Patient sat in the lounge and watched television with others. Minimal social interactions noted. On checking in with patient, she had fears in her eyes  due to concern that she will be discharged back to her apartment to live alone. Patient reported that she gets more depressed living alone. Patient said that she is not able to take care of herself when  she is depressed. Patient stated that she would like to be discharged to a group home. Patient was reassured and encouraged to talk to her  about that.     Patient endorsed anxiety and depression of 6 respectively. She denied SI/HI and hallucinations. Patient was compliant with her her medications. PRN melatonin given with her night medications at patient's request.   Before going to bed, patient requested for weighted blanket. Unfortunately, all the weighted blanket on the unit are in use. Patient showed RN a locker in the OT room labeled Weighted [place]. RN does not have OT locker key. Patient will talk to OT tomorrow to get one from them.     Patient remained out in the unit the whole shift. She was quiet and cooperative with care.

## 2023-11-20 NOTE — PLAN OF CARE
Problem: Depressive Signs/Symptoms  Goal: Increased Participation and Engagement (Depressive Signs/Symptoms)  Outcome: Progressing     Problem: Suicidal Behavior  Goal: Suicidal Behavior is Absent or Managed  Outcome: Progressing    Patient has been observed out in the common area most of the shift coloring and working on puzzles. Patient appears to keep to herself much of the time, but will engage occasionally with select peers. Patient is calm, pleasant and cooperative on approach. Affect is somewhat flat and blunted. Patient reports that she is feeling a little less depressed and feels that she is thinking a little more clearly now. Patient states she would like to talk to a  more about going to an IRTs when discharged so she can stay out of the hospital. Patient states that being alone in her apartment the last time she was discharged from hospital only increased her depression which lead to this hospitalization. Patient denies SI/SIB/HI/AH/VH and contracts for safety. She denies anxiety at this time and rated depression 5/10.   Patient is out at meal times and has been cooperative with taking all medications.   PRN senna given once for reports of constipation. Increased activity and fluids encouraged. No other reports of pain or discomfort.   Patient given PRN melatonin and hydroxyzine at bedtime for sleep.   Patient reports that she has taken other medication for sleep in the past. Patient thought it might have been doxepin, which is on her prior to admission list, but states she will to talk to doctor more about it tomorrow.     /87 (BP Location: Right arm, Patient Position: Sitting, Cuff Size: Adult Regular)   Pulse 107   Temp 98.1  F (36.7  C) (Temporal)   SpO2 95%

## 2023-11-20 NOTE — CARE PLAN
11/20/23 1557   Group Therapy Session   Group Attendance attended group session   Time Session Began 1300   Time Session Ended 1345   Total Time (minutes) 45   Total # Attendees 5   Group Type life skill;recreation;other (see comments)  (OT)   Group Topic Covered balanced lifestyle;cognitive activities;leisure exploration/use of leisure time;structured socialization   Group Session Detail OT - Topic:  Focus of group was leisure exploration and participation in a group setting that offered intrinsic motivation to engage in social, non-obligatory occupations for improved mental health management. Also provided opportunities to practice sharing thoughts and feelings, expanding social skills, improving feeling of self worth, and engagement in reality based activity and conversation.   Patient Response/Contribution able to recall/repeat info presented;cooperative with task;discussed personal experience with topic;expressed understanding of topic;listened actively   Patient Participation Detail Pt easily engaged in the game and did note at beginning of the group, noted she preferred this for the session vs a topic/discussion. Easily followed directions and was supportive to others. Utilized humor with others. Affect was bright.

## 2023-11-20 NOTE — CARE PLAN
11/20/23 1536   Group Therapy Session   Group Attendance attended group session   Time Session Began 1410   Time Session Ended 1450   Total Time (minutes) 40   Total # Attendees 6   Group Type psychotherapeutic   Group Topic Covered cognitive therapy techniques;cognitive activities   Group Session Detail DBT resources/ tool kit process discussion   Patient Response/Contribution cooperative with task;discussed personal experience with topic;expressed reluctance to alter behaviors;listened actively   Patient Participation Detail mood, pretty good, working on discharge plan. She was most focused on the assignment of anyone in [facility]. She has fears about being alone at home. This is based in childhood trauma that is unresolved. She stayed after [facility] for a short 1:1 appt , see note.

## 2023-11-20 NOTE — PLAN OF CARE
Problem: Adult Behavioral Health Plan of Care  Goal: Adheres to Safety Considerations for Self and Others  Outcome: Progressing     Problem: Adult Behavioral Health Plan of Care  Goal: Adheres to Safety Considerations for Self and Others  Intervention: Develop and Maintain Individualized Safety Plan  Recent Flowsheet Documentation  Taken 11/20/2023 1222 by Susan Cano RN  Safety Measures:   environmental rounds completed   safety rounds completed   self-directed behavior promoted   suicide assessment completed   suicide check-in completed   Goal Outcome Evaluation:    Plan of Care Reviewed With: patient      Pt was calm, and cooperative with cares. She rated her anxiety level at 9/10, depression at 7/10. She endorsed suicidal thoughts without any plan. Pt denied other psych symptoms. She took hydroxyzine that was offered. Pt said that she is frustrated, and concerned about where she will go from here due to loneliness in her apartment and don't want to hurt herself. She said that she is wondering if she should go to a group home or earth facility. Pt was weepy, and staff spent some time with her to encourage her. Pt was reminded that she has a whole care team to assist her with a place that will be right for her. Pt verbalized understanding, and expressed appreciation. Oral intake was good, and pt spent some of her time in the lounge watching TV, and she attended group. Staff will continue to follow plan of care.

## 2023-11-20 NOTE — PROGRESS NOTES
"[facility], [place]   Psychiatric Progress Note        Interim History:   The patient's care was discussed with the treatment team during the daily team meeting and/or staff's chart notes were reviewed. Staff report patient slept 5.75 hours last night. She requests PRN meds for anxiety and sleep. She remains with flat affect and depressed mood, but has been attending group and continued working on puzzles, see RN's note below from this weekend:    \"Patient has been observed out in the common area most of the shift coloring and working on puzzles. Patient appears to keep to herself much of the time, but will engage occasionally with select peers. Patient is calm, pleasant and cooperative on approach. Affect is somewhat flat and blunted. Patient reports that she is feeling a little less depressed and feels that she is thinking a little more clearly now. Patient states she would like to talk to a  more about going to an IRTs when discharged so she can stay out of the hospital. Patient states that being alone in her apartment the last time she was discharged from hospital only increased her depression which lead to this hospitalization. Patient denies SI/SIB/HI/AH/VH and contracts for safety. She denies anxiety at this time and rated depression 5/10. Patient is out at meal times and has been cooperative with taking all medications. PRN senna given once for reports of constipation. Increased activity and fluids encouraged. No other reports of pain or discomfort.\"     Met with patient: patient was seen in conference room. She appeared depressed but eye contact was improved. She continues to be compliant with medications but reports her PRN meds for sleep and anxiety, melatonin and hydroxyzine, have not been as helpful as she has awaken at 3 or 4 in the morning. She denies having trialed Gabapentin in the past. She notes having some passive suicidal ideation, explaining she felt " "this way over the weekend when she became \"overwhelmed\". Times when she felt overwhelmed included when she was thinking about her prior relationship. She notes continuing to puzzle to distract herself from these thoughts and also writes poetry as a coping mechanism. She was reassured these are good skills to utilize. Sveta continues to express interest in IRTS, not wanting to be alone at home when discharged. She did not have any other questions or concerns for us today.         Medications:      atorvastatin  20 mg Oral At Bedtime    DULoxetine  60 mg Oral Daily    metFORMIN  500 mg Oral Daily    risperiDONE  0.5 mg Oral At Bedtime          Allergies:     Allergies   Allergen Reactions    Wellbutrin [Bupropion]      Nausea and dizziness          Labs:     No results found for this or any previous visit (from the past 24 hour(s)).         Psychiatric Examination:     /84 (BP Location: Left arm)   Pulse 92   Temp 98.1  F (36.7  C) (Temporal)   SpO2 96%   Weight is 0 lbs 0 oz  There is no height or weight on file to calculate BMI.    Orthostatic Vitals       None          Appearance: awake, alert, dressed in hospital scrubs, and morbidly obese  Attitude: cooperative  Eye Contact: fair, improved  Mood: anxious and depressed  Affect: mood congruent and constricted mobility  Speech: decreased prosody and paucity of speech  Psychomotor Behavior: no evidence of tardive dyskinesia, dystonia, or tics and physical retardation  Throught Process: logical, linear, and goal oriented  Associations: no loose associations  Thought Content: no evidence of psychotic thought and passive suicidal ideation present  Insight: partial  Judgement: fair  Oriented to: time, person, and place  Attention Span and Concentration: fair  Recent and Remote Memory: fair    Clinical Global Impressions  First: 6/4 11/17/2023      Most recent:            Precautions:     Behavioral Orders   Procedures    Code 1 - Restrict to Unit    Routine " Programming     As clinically indicated    Status 15     Every 15 minutes.    Suicide precautions     Patients on Suicide Precautions should have a Combination Diet ordered that includes a Diet selection(s) AND a Behavioral Tray selection for Safe Tray - with utensils, or Safe Tray - NO utensils            DIagnoses:     1. Major depressive disorder, recurrent, severe without psychotic features.  2. Generalized anxiety disorder.  3. Borderline personality disorder by history.   4. PTSD.         Plan:   Patient agrees to stay at this facility voluntarily with hopes to go to IRTS afterwards. Cymbalta was increased to 60 mg daily on 11/16/2023 which she was agreeable with. Will start Gabapentin 300 mg qhs today 11/20/2023 for sleep. Will continue the rest of her PTA meds. Will meet with [facility] to discuss post discharge planning. Will continue to provide support and structure.     I was present with PA student who participated in the service and in the documentation of the note. I have verified the history and personally performed the physical exam and medical decision making. I agree with the assessment and plan of care as documented in the note.     Hayes Simpson MD  [facility] Psychiatry       Total time spent was 35 minutes. Over 50% of times was spent counseling and coordination of care regarding coping skills, medication and discharge planning.

## 2023-11-20 NOTE — CARE PLAN
11/20/23 1546   Group Therapy Session   Group Attendance attended group session   Time Session Began 1015   Time Session Ended 1200   Total Time (minutes) 90 1x45, 1x45   Total # Attendees 5   Group Type life skill;task skill;other (see comments)  (OT)   Group Topic Covered balanced lifestyle;coping skills/lifestyle management;leisure exploration/use of leisure time;structured socialization   Group Session Detail OT Clinics: 2 sessions Activity group for creative expression, promoting autonomy, building self worth, coping with stress and symptoms, and an opportunity to exercise cognitive skills (I.e. initiation, planning, organizing, sequencing, sustained attention, follow through, and overall self awareness).   Patient Response/Contribution able to recall/repeat info presented;cooperative with task;discussed personal experience with topic;expressed understanding of topic   Patient Participation Detail First session: Pt noted to staff that she really did not want to complete her second project. She was open to discuss and share reasons and work with staff in how to manage and continue to complete this activity. She also noted forgetting that she had a first project she had worked on and it was ready for the final step. Able to work independently on next step and make color choices. Social with others.  Second session: Started session with more flat affect and noted that she had met with provider and discussion held about looking at other discharge options vs going to ITRS. Focused for several minutes on not being sure she could live alone. Asked another patient about where they were going. Patient noted it was a group home and he did not like IRTS. She then began to wonder out loud if she could maybe do that. Other patients began to share ideas about roommates, etc. She did listen to this. She then was able to refocus on her project for remainder of session.

## 2023-11-20 NOTE — PLAN OF CARE
Team Note Due:  Thursday    Assessment/Intervention/Current Symtoms and Care Coordination:  Chart review and met with team, discussed pt progress, symptomology, and response to treatment.  Discussed the discharge plan and any potential impediments to discharge.    Team discussed the targeted symptom of loneliness and whether iRTS was appropriate or are tehre other interventions that would be more effective.  Pt asked to see the CTC but understood that the CTC will be back on Wednesday.      Discharge Plan or Goal:  Current plan is to stabilize symptoms and develop safe disposition plan. Following hospitalization, plan is to discharge home with outpatient support or to an IRTS.     Barriers to Discharge:  Pt was recently admitted and continues to stabilize.     Referral Status:  Pt currently has outpatient providers that support her. Will schedule follow-up appointments closer to discharge.     Legal Status:  Pt is voluntary    Contacts:  Medication Management/Psychiatry:  Name/Clinic: Nkechi Hendrix NP  [facility] *Pt attends via telehealth*  Address: [address] [place] [id number]   Phone: [phone]   Fax: [phone]      Therapist:   Name/Clinic: ERNESTO Pandya  [facility]   Address: [address] [place] [address] [place] [address] *Pt attends via telehealth*  Number: [phone]     DBT Groups:  Clinic: [facility] *Pt attends via telehealth*  Schedule: 9am - 12pm Monday, Tuesday, Thursday  Phone: [phone]    /[facility] Team:  [facility]  [facility] [phone]   Number: Unknown     Upcoming Meetings and Dates/Important Information and next steps:  None

## 2023-11-21 PROCEDURE — 99232 SBSQ HOSP IP/OBS MODERATE 35: CPT | Performed by: PSYCHIATRY & NEUROLOGY

## 2023-11-21 PROCEDURE — H2032 ACTIVITY THERAPY, PER 15 MIN: HCPCS

## 2023-11-21 PROCEDURE — 124N000002 HC R&B MH UMMC

## 2023-11-21 PROCEDURE — G0177 OPPS/PHP; TRAIN & EDUC SERV: HCPCS

## 2023-11-21 PROCEDURE — 250N000013 HC RX MED GY IP 250 OP 250 PS 637: Performed by: PSYCHIATRY & NEUROLOGY

## 2023-11-21 RX ADMIN — GABAPENTIN 300 MG: 300 CAPSULE ORAL at 21:05

## 2023-11-21 RX ADMIN — Medication 3 MG: at 21:06

## 2023-11-21 RX ADMIN — DULOXETINE HYDROCHLORIDE 60 MG: 60 CAPSULE, DELAYED RELEASE ORAL at 08:30

## 2023-11-21 RX ADMIN — HYDROXYZINE HYDROCHLORIDE 50 MG: 25 TABLET ORAL at 16:45

## 2023-11-21 RX ADMIN — ATORVASTATIN CALCIUM 20 MG: 20 TABLET, FILM COATED ORAL at 21:05

## 2023-11-21 RX ADMIN — RISPERIDONE 0.5 MG: 0.5 TABLET, ORALLY DISINTEGRATING ORAL at 21:05

## 2023-11-21 RX ADMIN — METFORMIN HYDROCHLORIDE 500 MG: 500 TABLET, EXTENDED RELEASE ORAL at 18:01

## 2023-11-21 ASSESSMENT — ACTIVITIES OF DAILY LIVING (ADL)
ADLS_ACUITY_SCORE: 29
HYGIENE/GROOMING: INDEPENDENT
DRESS: INDEPENDENT
ADLS_ACUITY_SCORE: 29
ORAL_HYGIENE: INDEPENDENT
ADLS_ACUITY_SCORE: 29
ADLS_ACUITY_SCORE: 29
LAUNDRY: WITH SUPERVISION

## 2023-11-21 NOTE — PLAN OF CARE
"RN Shift Summary     Patient presented with a calm affect. She was present in the milieu throughout the day. She socialized with peers, watched TV, and worked on puzzles. She denies SI/SIB/HI at this time. She said she feels better when she is around people and keeping busy. She reported her anxiety is manageable so far today and no intervention is needed. She took all scheduled medications; no PRNs requested or indicated. She took a shower today. She obtained numbers off her cell phone today with supervision. She reported that she slept well with Gabapentin last evening. She said she \"woke up a few times\" and that Gabapentin wasn't as effective as trazodone, but she said it was helpful and a noticeable improvement.    Vitals: B/P: 133/87, T: 98.6, P: 103  "

## 2023-11-21 NOTE — PLAN OF CARE
Problem: Sleep Disturbance  Goal: Adequate Sleep/Rest  Outcome: Progressing  Goal Outcome Evaluation:  Patient observed in assigned room throughout the night and appeared to be sleeping with no signs of distress and minimal interruptions. No PRN medications requested or administered. No behavioral concerns.   Sleep hours - 7.    Nursing will continue to monitor.

## 2023-11-21 NOTE — CARE PLAN
Occupational Therapy Group Note:     11/21/23 1655   Group Therapy Session   Group Attendance attended group session   Time Session Began 1400   Time Session Ended 1445   Total Time (minutes) 45   Total # Attendees 4   Group Type recreation   Group Topic Covered leisure exploration/use of leisure time;structured socialization   Group Session Detail OT Leisure Group: Scattergories   Patient Response/Contribution confronted peers appropriately;cooperative with task;listened actively;offered helpful suggestions to peers;organized   Patient Participation Detail Focus of today's group was on healthy leisure exploration and engagement. Patient actively participated in a group game in order to: improve social skills and decrease isolative behaviors, exercise cognitive skills, improve concentration, and encourage new learning and retention. Patient was familiar with activity and assisted with explaining rules of the game to others. Patient remained engaged and attentive throughout entirety of task. Demonstrated good frustration tolerance when unable to think of responses or when peers challenged thinking. Pleasant and engaged participant.

## 2023-11-21 NOTE — CARE PLAN
Occupational Therapy Group Note:     11/21/23 5832   Group Therapy Session   Group Attendance attended group session   Time Session Began 1300   Time Session Ended 1345   Total Time (minutes) 45   Total # Attendees 3   Group Type task skill   Group Topic Covered coping skills/lifestyle management;emotions/expression;leisure exploration/use of leisure time;structured socialization;relaxation techniques   Group Session Detail OT Task Group: Sand Art   Patient Response/Contribution confronted peers appropriately;cooperative with task;listened actively;organized   Patient Participation Detail Patient engaged in a meaningful and creative hands-on sand art project. This activity allows for creative expression, positive distraction and coping with symptoms, and mood stabilization. In addition, this activity promotes cognitive functioning by addressing: focus and attention, attention to detail, new learning, direction following, frustration tolerance, follow-through, and simple problem solving. Patient was able to listen to instructions, initiate engagement, and follow-through until task completion. Patient worked diligently on task for duration. Demonstrated good planning and engagement in task. Patient confirmed happiness with end result. Intermittently social with peers. Pleasant and engaged participant.

## 2023-11-21 NOTE — PLAN OF CARE
"Mood and Affect: Patient described mood as \"Sad.\" Patient worried about discharging to a group home and not doing \"stuff\" independently.   Affect is full range.     LOC and Orientation: Alert. Oriented to person, place, time and situation.    Behavior and Interaction: Patient is visible in the milieu. Engaged with select peers.     Pleasant, calm and cooperative with nursing assessment.     Mental Health Symptoms: Reports moderate anxiety, denies suicidal ideation or any other mental health symptoms.    Medical Concerns: No new concerns.    Other Concerns: Patient would prefer to discharge to a place where patient will not be alone due to increase in suicidal ideation when alone, but would also not want to discharge to a place that will restrict patient's independence.    Medication Compliant: Patient is compliant with all scheduled medication.    PRN:Hydroxyzine. Melatonin.    Medication Side Effects: Not reported. None observed.    Food Intake: Good appetite. 100% dinner.    Elimination: Denies problems, last bowel movement.    Self Care: Independent. Well groomed.    Vital Signs: Denies pain.  /85   Pulse 99   Temp 97.6  F (36.4  C) (Temporal)   Wt 107.3 kg (236 lb 8 oz)   SpO2 96%   BMI 46.19 kg/m        Problem: Depressive Signs/Symptoms  Goal: Increased Participation and Engagement (Depressive Signs/Symptoms)  Outcome: Progressing  Intervention: Facilitate Participation and Engagement  Recent Flowsheet Documentation  Taken 11/21/2023 1700 by Stephanie Voss RN  Diversional Activity: television     Problem: Suicidal Behavior  Goal: Suicidal Behavior is Absent or Managed  Outcome: Progressing   Goal Outcome Evaluation:    Plan of Care Reviewed With: patient                   "

## 2023-11-21 NOTE — PLAN OF CARE
Team Note Due:  Thursday     Assessment/Intervention/Current Symtoms and Care Coordination:  Chart review and met with team, discussed pt progress, symptomology, and response to treatment.  Discussed the discharge plan and any potential impediments to discharge.     Team discussed the targeted symptom of loneliness and whether iRTS was appropriate or are there other interventions that would be more effective.  Pt asked to see the CTC but understood that the CTC will be back on Wednesday.     Patient improving and looking forward to further discussion regarding discharge planning.       Discharge Plan or Goal:  Current plan is to stabilize symptoms and develop safe disposition plan. Following hospitalization, plan is to discharge home with outpatient support or to an IRTS.     Barriers to Discharge:  Pt was recently admitted and continues to stabilize.     Referral Status:  Pt currently has outpatient providers that support her. Will schedule follow-up appointments closer to discharge.     Legal Status:  Pt is voluntary     Contacts:  Medication Management/Psychiatry:  Name/Clinic: Nkechi Hendrix NP  [facility] *Pt attends via telehealth*  Address: [address] [place] [address]   Phone: [phone]   Fax: [phone]      Therapist:   Name/Clinic: ERNESTO Pandya  [facility]   Address: [address] [facility] [place] [address] *Pt attends via telehealth*  Number: [phone]      DBT Groups:  Clinic: [facility] *Pt attends via telehealth*  Schedule: 9am - 12pm Monday, Tuesday, Thursday  Phone: [phone]     /ACT Team:  Name/Clinic: Maribell  [facility]  or [facility] [phone]   Number: Unknown     Upcoming Meetings and Dates/Important Information and next steps:  None

## 2023-11-21 NOTE — CARE PLAN
"   11/20/23 4667   Group Therapy Session   Group Attendance attended group session   Time Session Began 1700   Time Session Ended 1750   Total Time (minutes) 50   Total # Attendees 4   Group Type life skill   Patient Participation Detail General health and coping with a focus on group discussion & hands-on activity related to each dimension of health/wellness. Pt was offered activities to engage both physical and intellectual wellness via theraband and cognitive challenge questions. Pt Response: Pt independently identified ways they engage in self-care which was broken into categories: physical, spiritual, emotional/mental, intellectual, and social. Pt identified \"putting on my makeup as an art form\" as a personal example of self-care that they currently utilize to maintain a healthy daily routine. Pt demonstrated active listening and appropriately engaged in the offered activity without incident. Pts affect brightened and mood appeared to improve over the course of group. Pleasant and engaged participant. Reported enjoying the content of the group.        "

## 2023-11-21 NOTE — CARE PLAN
Occupational Therapy Group Note:     11/21/23 1641   Group Therapy Session   Group Attendance attended group session   Time Session Began 1015   Time Session Ended 1105   Total Time (minutes) 40 (no charge)   Total # Attendees 5   Group Type expressive therapy;task skill   Group Topic Covered cognitive activities;coping skills/lifestyle management;emotions/expression;leisure exploration/use of leisure time;relaxation techniques;structured socialization   Group Session Detail OT Clinc Group   Patient Response/Contribution confronted peers appropriately;cooperative with task   Patient Participation Detail Pt actively participated in occupational therapy clinic to facilitate coping skill exploration, creative expression within personally meaningful activities, and clinical observation of social, cognitive, and kinesthetic performance skills. Pt response: supervision to initiate, gather materials, sequence, and adjust to workspace demands as needed. Demonstrated good focus, planning, and problem solving for selected wood project task. Able to ask for assistance as needed, and appropriate with peers and staff. Patient worked slowly, yet appropriately on task for majority of group. Intermittently social with peers. Pleasant and cooperative in group.

## 2023-11-21 NOTE — PROGRESS NOTES
[facility], [place]   Psychiatric Progress Note        Interim History:   The patient's care was discussed with the treatment team during the daily team meeting and/or staff's chart notes were reviewed. Staff report patient slept 7 hours last night. She remains with flat affect and depressed mood, but has been attending groups and continues working on puzzles.    Met with patient: patient was seen in conference room on two occasions, once with PA student and the second time with PA student and provider. She appeared depressed but eye contact was improved. She continues to be compliant with medications and reports benefit with use of Gabapentin last night and that she slept well. She also took PRN melatonin with night medications per RN's note. She notes having some passive suicidal ideation when she feels overwhelmed, stating this happened last yesterday. She continues to puzzle to distract herself from these thoughts. She notes going to groups and was working on a car for her 3 year old son. Sveta expressed interest both to staff and us today that she would like to go to group home or IRTS, not wanting to be alone at home when discharged. She continues to reiterate these thoughts that she is not safe at home as she is alone in this setting. She is hoping to talk further with CTC once they return tomorrow. She did not have any other questions or concerns for us today.         Medications:      atorvastatin  20 mg Oral At Bedtime    DULoxetine  60 mg Oral Daily    gabapentin  300 mg Oral At Bedtime    metFORMIN  500 mg Oral Daily    risperiDONE  0.5 mg Oral At Bedtime          Allergies:     Allergies   Allergen Reactions    Wellbutrin [Bupropion]      Nausea and dizziness          Labs:     No results found for this or any previous visit (from the past 24 hour(s)).         Psychiatric Examination:     /87   Pulse 103   Temp 98.6  F (37  C) (Temporal)   Wt 107.3 kg (236 lb 8  oz)   SpO2 94%   BMI 46.19 kg/m    Weight is 236 lbs 8 oz  Body mass index is 46.19 kg/m .    Orthostatic Vitals         Most Recent      Sitting Orthostatic /81 11/21 0858    Sitting Orthostatic Pulse (bpm) 89 11/21 0858    Standing Orthostatic /86 11/21 0858    Standing Orthostatic Pulse (bpm) 105 11/21 0858          Appearance: awake, alert, dressed in home clothes, and morbidly obese  Attitude: cooperative  Eye Contact: fair, improved  Mood: less anxious and depressed  Affect: mood congruent and constricted mobility  Speech: paucity of speech improved  Psychomotor Behavior: no evidence of tardive dyskinesia, dystonia, or tics and physical retardation  Throught Process: logical, linear, and goal oriented  Associations: no loose associations  Thought Content: no evidence of psychotic thought and passive suicidal ideation present off and on, not today;  Insight: partial  Judgement: fair  Oriented to: time, person, and place  Attention Span and Concentration: fair  Recent and Remote Memory: fair    Clinical Global Impressions  First: 6/4 11/17/2023      Most recent:            Precautions:     Behavioral Orders   Procedures    Code 1 - Restrict to Unit    Routine Programming     As clinically indicated    Status 15     Every 15 minutes.    Suicide precautions     Patients on Suicide Precautions should have a Combination Diet ordered that includes a Diet selection(s) AND a Behavioral Tray selection for Safe Tray - with utensils, or Safe Tray - NO utensils            DIagnoses:     1. Major depressive disorder, recurrent, severe without psychotic features.  2. Generalized anxiety disorder.  3. Borderline personality disorder by history.   4. PTSD.         Plan:   Patient agrees to stay at this facility voluntarily with hopes to go to IRTS afterwards. Cymbalta was increased to 60 mg daily on 11/16/2023 which she was agreeable with. Started on Gabapentin 300 mg at bedtime for sleep on 11/20/2023 and she  reports benefit last night. No further medication changes today 11/21/2023. Will continue the rest of her PTA meds. Will meet with [facility] to discuss post discharge planning. Will continue to provide support and structure.     I was present with PA student who participated in the service and in the documentation of the note. I have verified the history and personally performed the physical exam and medical decision making. I agree with the assessment and plan of care as documented in the note.     Hayes Simpson MD  [facility] Psychiatry       Total time spent was 35 minutes. Over 50% of times was spent counseling and coordination of care regarding coping skills, medication and discharge planning.

## 2023-11-22 LAB — GLUCOSE BLDC GLUCOMTR-MCNC: 114 MG/DL (ref 70–99)

## 2023-11-22 PROCEDURE — G0177 OPPS/PHP; TRAIN & EDUC SERV: HCPCS

## 2023-11-22 PROCEDURE — 250N000013 HC RX MED GY IP 250 OP 250 PS 637: Performed by: PSYCHIATRY & NEUROLOGY

## 2023-11-22 PROCEDURE — 90853 GROUP PSYCHOTHERAPY: CPT

## 2023-11-22 PROCEDURE — 124N000002 HC R&B MH UMMC

## 2023-11-22 PROCEDURE — 99231 SBSQ HOSP IP/OBS SF/LOW 25: CPT | Performed by: PSYCHIATRY & NEUROLOGY

## 2023-11-22 RX ADMIN — Medication 3 MG: at 21:35

## 2023-11-22 RX ADMIN — RISPERIDONE 0.5 MG: 0.5 TABLET, ORALLY DISINTEGRATING ORAL at 21:35

## 2023-11-22 RX ADMIN — ACETAMINOPHEN 650 MG: 325 TABLET, FILM COATED ORAL at 16:10

## 2023-11-22 RX ADMIN — METFORMIN HYDROCHLORIDE 500 MG: 500 TABLET, EXTENDED RELEASE ORAL at 17:21

## 2023-11-22 RX ADMIN — GABAPENTIN 300 MG: 300 CAPSULE ORAL at 21:35

## 2023-11-22 RX ADMIN — ATORVASTATIN CALCIUM 20 MG: 20 TABLET, FILM COATED ORAL at 21:35

## 2023-11-22 RX ADMIN — DULOXETINE HYDROCHLORIDE 60 MG: 60 CAPSULE, DELAYED RELEASE ORAL at 08:10

## 2023-11-22 ASSESSMENT — ACTIVITIES OF DAILY LIVING (ADL)
ADLS_ACUITY_SCORE: 29
ADLS_ACUITY_SCORE: 29
DRESS: SCRUBS (BEHAVIORAL HEALTH)
ADLS_ACUITY_SCORE: 29
ADLS_ACUITY_SCORE: 29
HYGIENE/GROOMING: INDEPENDENT
ORAL_HYGIENE: INDEPENDENT
ADLS_ACUITY_SCORE: 29
HYGIENE/GROOMING: INDEPENDENT
ADLS_ACUITY_SCORE: 29
DRESS: INDEPENDENT
ADLS_ACUITY_SCORE: 29
ORAL_HYGIENE: INDEPENDENT
ADLS_ACUITY_SCORE: 29
LAUNDRY: WITH SUPERVISION
ADLS_ACUITY_SCORE: 29

## 2023-11-22 NOTE — PROGRESS NOTES
"[facility], [place]   Psychiatric Progress Note        Interim History:   The patient's care was discussed with the treatment team during the daily team meeting and/or staff's chart notes were reviewed. Staff report patient slept 6.75 hours last night. She continues to attend groups and remains in the milieu for much of the day time.    Met with patient: patient was seen in conference room. She appeared less depressed and eye contact was improved. She reports \"finally starting to feel happy again.\" She continues to be compliant with medications. She notes off and on passive suicidal ideation, most often present when she feels overwhelmed. She has not felt this way since Monday, 11/20. She continues to puzzle to distract herself from these thoughts. She continues attending groups and spending time with peers in the milieu. Sveta has expressed interest in going to a group home or IRTS, not wanting to be alone at home when discharged. She would prefer not to lose her apartment, but knows she would not be safe going back here. She finds her overwhelming thoughts often returning when she is alone and has nothing to do. Sveta did say by the end of our visit with her that she understands that she might need to go home and wait there while bed at group home becomes available. She explains for her to have safe return to society she may want to look into an ARMS worker and think it best if her days are filled with things for her to do. She is in agreement with discussing placement with her CTC today. Lastly, she is agreeable to start BS checks before breakfast and dinner to monitor her diabetes. She continues on Metformin and last A1C was 6.0% on 11/17. She did not have any other questions or concerns for us today.         Medications:      atorvastatin  20 mg Oral At Bedtime    DULoxetine  60 mg Oral Daily    gabapentin  300 mg Oral At Bedtime    metFORMIN  500 mg Oral Daily    risperiDONE  " "0.5 mg Oral At Bedtime          Allergies:     Allergies   Allergen Reactions    Wellbutrin [Bupropion]      Nausea and dizziness          Labs:     No results found for this or any previous visit (from the past 24 hour(s)).         Psychiatric Examination:     /85   Pulse 99   Temp 97  F (36.1  C) (Temporal)   Wt 107.3 kg (236 lb 8 oz)   SpO2 96%   BMI 46.19 kg/m    Weight is 236 lbs 8 oz  Body mass index is 46.19 kg/m .    Orthostatic Vitals         Most Recent      Sitting Orthostatic /74 11/22 0806    Sitting Orthostatic Pulse (bpm) 83 11/22 0806    Standing Orthostatic /84 11/22 0806    Standing Orthostatic Pulse (bpm) 97 11/22 0806          Appearance: awake, alert, dressed in home clothes, and morbidly obese  Attitude: cooperative  Eye Contact: fair, improved  Mood: less anxious and depressed, self described- \"finally starting to feel happy again\"  Affect: mood congruent  Speech: more talkative  Psychomotor Behavior: no evidence of tardive dyskinesia, dystonia, or tics and physical retardation  Throught Process: logical, linear, and goal oriented  Associations: no loose associations  Thought Content: no evidence of psychotic thought and passive suicidal ideation present off and on, not today  Insight: partial  Judgement: fair  Oriented to: time, person, and place  Attention Span and Concentration: fair  Recent and Remote Memory: fair    Clinical Global Impressions  First: 6/4 11/17/2023      Most recent:            Precautions:     Behavioral Orders   Procedures    Code 1 - Restrict to Unit    Routine Programming     As clinically indicated    Status 15     Every 15 minutes.    Suicide precautions     Patients on Suicide Precautions should have a Combination Diet ordered that includes a Diet selection(s) AND a Behavioral Tray selection for Safe Tray - with utensils, or Safe Tray - NO utensils            DIagnoses:     1. Major depressive disorder, recurrent, severe without psychotic " features.  2. Generalized anxiety disorder.  3. Borderline personality disorder by history.   4. PTSD.         Plan:   Patient agrees to stay at this facility voluntarily with hopes to go to IRTS or Group Home afterwards. Will meet with [facility] to discuss post discharge planning. Cymbalta was increased to 60 mg daily on 11/16/2023 which she was agreeable with. Started on Gabapentin 300 mg at bedtime for sleep on 11/20/2023 and she reports benefit. She will begin BS checks before breakfast and dinner for her diabetes. No further medication changes today 11/22/2023. Will continue the rest of her PTA meds. Will continue to provide support and structure.     I was present with PA student who participated in the service and in the documentation of the note. I have verified the history and personally performed the physical exam and medical decision making. I agree with the assessment and plan of care as documented in the note.     Hayes Simpson MD  [facility] Psychiatry       Total time spent was 26 minutes. Over 50% of times was spent counseling and coordination of care regarding coping skills, medication and discharge planning.

## 2023-11-22 NOTE — PLAN OF CARE
BEH IP Unit Acuity Rating Score (UARS)  Patient is given one point for every criteria they meet.    CRITERIA SCORING   On a 72 hour hold, court hold, committed, stay of commitment, or revocation 0/1    Patient LOS on BEH unit exceeds 20 days 0/1  LOS: 6   Patient under guardianship, 55+, otherwise medically complex, or under age 11 0/1   Suicide ideation without relief of precipitating factors 1/1   Current plan for suicide 0/1   Current plan for homicide 0/1   Imminent risk or actual attempt to seriously harm another without relief of factors precipitating the attempt 0/1   Severe dysfunction in daily living (ex: complete neglect for self care, extreme disruption in vegetative function, extreme deterioration in social interactions) 0/1   Recent (last 7 days) or current physical aggression in the ED or on unit 0/1   Restraints or seclusion episode in past 72 hours 0/1   Recent (last 7 days) or current verbal aggression, agitation, yelling, etc., while in the ED or unit 0/1   Active psychosis 0/1   Need for constant or near constant redirection (from leaving, from others, etc).  0/1   Intrusive or disruptive behaviors 0/1   TOTAL 1

## 2023-11-22 NOTE — PROGRESS NOTES
Behavioral Health  Note   Behavioral Health  Spirituality Group Note     [address]    Name: Sveta MADRIGAL [place]    YOB: 1985   MRN: [id number]    Age: 38 year old     Patient attended -led group, which included discussion of spirituality, coping with illness and building resilience.   Patient attended group for [facility] - spirituality groups are not billed.   The patient actively participated in group discussion     [facility]  Staff    Page [phone]

## 2023-11-22 NOTE — CARE PLAN
11/22/23 1554   Group Therapy Session   Group Attendance attended group session   Time Session Began 1110   Time Session Ended 1200   Total Time (minutes) 45   Total # Attendees 3   Group Type psychotherapeutic   Group Topic Covered coping skills/lifestyle management;structured socialization   Group Session Detail Process- gratitude/ creative expression   Patient Response/Contribution cooperative with task;expressed readiness to alter behaviors;listened actively   Patient Participation Detail pt had a breakthough with her writing and how she could express through visual art, writing and feelings as a positive coping skill for insight. She and writer agreed to meet again and work more with this tool for healing.

## 2023-11-22 NOTE — PLAN OF CARE
Team Note Due:  Thursday    Assessment/Intervention/Current Symtoms and Care Coordination:  Chart review and met with team, discussed pt progress, symptomology, and response to treatment.  Discussed the discharge plan and any potential impediments to discharge.    Tasks Completed:  - Met with pt to discuss discharge plans. CTC again expressed concern regarding IRTS as a temporary placement that wouldn't address ongoing concerns and triggers. Pt shared that she is interested in a group home and has stayed in one previously when she lived in [place]. CTC asked pt what happened when she was at the group home previously and pt reported she had wanted more independence. CTC asked whether this would come up for pt again if placed in a group home and pt reported that she is older now and recognizes she needs more support. Pt also spoke about shame she had previously about needing more support through the group home setting and that she no longer has those feelings of shame. CTC discussed CADI waiver for payment of group homes with pt. Pt reported she will contact her [facility]  to ask if she can start this process with pt. CTC discussed discharge options with pt and highly recommended a DBT-adherent program and pt agreed this would be helpful. Pt continues to report she does not feel safe returning home and would like to go to IRTS and then discharge either home with DBT or to a group home with DBT. Got DIAN from pt to start IRTS referrals.  - IRTS referrals started.    Discharge Plan or Goal:  Current plan is to stabilize symptoms and develop safe disposition plan. Following hospitalization, plan is to discharge home with outpatient support or to an IRTS.     Barriers to Discharge:  Pt was recently admitted and continues to stabilize.     Referral Status:  Pt currently has outpatient providers that support her. Will schedule follow-up appointments closer to discharge.    IRTS Referrals:  [facility] - Submitted  11/22/2023  Address: [address] [place] [address]  Phone: [phone]    [facility] - Submitted 11/22/2023   Phone: [phone]    Fax: [phone]   Email: [email or web address]   11/22/2023: Staff from [facility] called to confirm receipt of referral. Noted they currently have a waitlist and to call back Monday to speak with Kaelyn    [employer] [facility] - Submitted 11/22/2023  ([facility] [address] [facility])  Direct: [phone]     Referral Fax: [phone]     Legal Status:  Pt is voluntary    Contacts:  Medication Management/Psychiatry:  Name/Clinic: Nkechi Hendrix NP  [facility] *Pt attends via telehealth*  Address: [address] [place] [address] [address] [address]   Phone: [phone]   Fax: [phone]      Therapist:   Name/Clinic: ERNESTO Pandya  [facility]   Address: [address] [place], [place] [address], [place] [address] *Pt attends via telehealth*  Number: [phone]     DBT Groups:  [facility]: [facility] *Pt attends via telehealth*  Schedule: 9am - 12pm Monday, Tuesday, Thursday  Phone: [phone]    /ACT Team:  Name/Clinic: [facility]  or [facility] [phone]   Number: Unknown     Upcoming Meetings and Dates/Important Information and next steps:  None

## 2023-11-22 NOTE — PLAN OF CARE
Goal Outcome Evaluation:     Pt appeared to be a sleep @ all safety checks.  Pt slept 6.75 hours.

## 2023-11-22 NOTE — PROGRESS NOTES
"Adult Inpatient Safety Plan:     [facility] Adult Inpatient Mental Health Units           [address]                                Sveta MADRIGAL [place]     SAFETY PLAN:  Step 1: Warning signs / cues (Thoughts, images, mood, situation, behavior) that a crisis may be developing:  Thoughts: \"I don't matter\"  Thinking Processes: self critical  Mood: worsening depression  Behaviors: isolating/withdrawing  and can't stop crying  Situations: pain, relationship problems, and trauma  emotional pain, intensity of emotions described    Step 2: Coping strategies - Things I can do to take my mind off of my problems without contacting another person (relaxation technique, physical activity):  Distress Tolerance Strategies:  relaxation activities: puzzle and arts and crafts: art and poetry  Physical Activities:  fresh air  Focus on helpful thoughts:  \"Ride the wave\" and self-compassion statements: kindness to self    Step 3: Remind myself of people and things that are important to me and worth living for:    Son Kt Calhoun    Step 4: When I am in crisis, I can ask these people to help me use my safety plan:   Name: Kika Phone: [phone]   Name: Lj Phone: [phone]    Step 5: Making the environment safe:   I will remove alcohol and drugs, secure my medications, dispose of old medications, remove access to firearms, remove things I could use to hurt myself, and identify supportive people  Other ways to make my environment safe: above agreed,     Step 6: Professionals or agencies I can contact during a crisis:  [facility] Rapid Response: [phone]  text [place] to 325515  988 or 911  [place] [phone] [facility]   Nkechi Hendrix -Psychiatry  Heath Mentele - Therapist      If unable to maintain safety despite working your plan, call 911 or go to my nearest emergency department.         Patient helped develop this safety plan and agreed to use it when needed.  Pt has been given a copy of " this plan.          Today s date:  November 22, 2023  Completed by Clinician Name/ Credentials:  Hector White, LMFT- ATR-BC  Licensed Marriage and Family Therapist and   Registered and Board Certified Art Therapist          Adapted from Safety Plan Template 2008 Randa Wetzel and Guru Bey is reprinted with the express permission of the authors.  No portion of the Safety Plan Template may be reproduced without the express, written permission.  You can contact the authors at [email or web address] or [email or web address].

## 2023-11-22 NOTE — PROGRESS NOTES
11/21/23 1900   Group Therapy Session   Group Attendance attended group session   Time Session Began 1700   Time Session Ended 1755   Total Time (minutes) 50   Total # Attendees 5   Group Type recreation   Group Topic Covered leisure exploration/use of leisure time   Group Session Detail TR leisure group   Patient Response/Contribution cooperative with task   Patient Participation Detail Pt attended the structured Therapeutic Recreation group, participating in a group activity. Pt participated in group discussion, leisure participation, and social engagement to gain self-esteem, manage behaviors, improve social skills, decrease isolation, and reduce anxiety/depression.   Pt remained focused and engaged throughout group activity.  Pt was sociable and was appropriate with interactions with the others in group. Pt was an active participant, contributing to the clues and descriptions throughout the activity.

## 2023-11-22 NOTE — PLAN OF CARE
Problem: Depressive Signs/Symptoms  Goal: Increased Participation and Engagement (Depressive Signs/Symptoms)  Outcome: Progressing  Intervention: Facilitate Participation and Engagement  Recent Flowsheet Documentation  Taken 11/22/2023 1039 by Stephanie Kirk RN  Diversional Activity:   music   television   puzzles     Pt was visible in milieu, social with select peers. Affect was blunted, flat but pleasant upon approach. Mood was anxious, somber. Pt is taking metformin once a day at dinnertime but did not have any glucose monitoring orders. She states that she was diagnosed with DM 2 about two years ago but she doesn't check her BG at all and she doesn't take any insulin or antidiabetic medications. Provider put in order for BID BG checks. Pt ate meals and was compliant with medication. Pt was able to use her phone to print off a picture of her son which she was very happy about. No SI/SIB noted or observed. Will continue to monitor.

## 2023-11-22 NOTE — CARE PLAN
11/22/23 1511   Group Therapy Session   Group Attendance attended group session   Time Session Began 1300   Time Session Ended 1345   Total Time (minutes) 45   Total # Attendees 6   Group Type life skill;task skill;other (see comments)  (OT)   Group Topic Covered balanced lifestyle;coping skills/lifestyle management;leisure exploration/use of leisure time;structured socialization   Group Session Detail OT Clinic: Activity group for creative expression, promoting autonomy, building self worth, coping with stress and symptoms, and an opportunity to exercise cognitive skills (I.e. initiation, planning, organizing, sequencing, sustained attention, follow through, and overall self awareness).   Patient Response/Contribution able to recall/repeat info presented;discussed personal experience with topic;expressed understanding of topic;listened actively   Patient Participation Detail Pt presented with bright affect and was social and laughing during social discussions and topics. She engaged in planning with the next step of her project and she did ask for help with the directions and assembly aspect of her project.

## 2023-11-23 LAB
GLUCOSE BLDC GLUCOMTR-MCNC: 141 MG/DL (ref 70–99)
GLUCOSE BLDC GLUCOMTR-MCNC: 152 MG/DL (ref 70–99)

## 2023-11-23 PROCEDURE — 250N000013 HC RX MED GY IP 250 OP 250 PS 637: Performed by: PSYCHIATRY & NEUROLOGY

## 2023-11-23 PROCEDURE — 90853 GROUP PSYCHOTHERAPY: CPT

## 2023-11-23 PROCEDURE — 99231 SBSQ HOSP IP/OBS SF/LOW 25: CPT | Performed by: PSYCHIATRY & NEUROLOGY

## 2023-11-23 PROCEDURE — H2032 ACTIVITY THERAPY, PER 15 MIN: HCPCS

## 2023-11-23 PROCEDURE — 124N000002 HC R&B MH UMMC

## 2023-11-23 RX ADMIN — DULOXETINE HYDROCHLORIDE 60 MG: 60 CAPSULE, DELAYED RELEASE ORAL at 08:52

## 2023-11-23 RX ADMIN — Medication 3 MG: at 20:04

## 2023-11-23 RX ADMIN — ATORVASTATIN CALCIUM 20 MG: 20 TABLET, FILM COATED ORAL at 20:00

## 2023-11-23 RX ADMIN — OLANZAPINE 10 MG: 10 TABLET, FILM COATED ORAL at 16:23

## 2023-11-23 RX ADMIN — HYDROXYZINE HYDROCHLORIDE 50 MG: 25 TABLET ORAL at 15:04

## 2023-11-23 RX ADMIN — METFORMIN HYDROCHLORIDE 500 MG: 500 TABLET, EXTENDED RELEASE ORAL at 18:41

## 2023-11-23 RX ADMIN — RISPERIDONE 0.5 MG: 0.5 TABLET, ORALLY DISINTEGRATING ORAL at 20:00

## 2023-11-23 RX ADMIN — GABAPENTIN 300 MG: 300 CAPSULE ORAL at 20:00

## 2023-11-23 ASSESSMENT — ACTIVITIES OF DAILY LIVING (ADL)
ADLS_ACUITY_SCORE: 29
DRESS: INDEPENDENT
ADLS_ACUITY_SCORE: 29
LAUNDRY: WITH SUPERVISION
ADLS_ACUITY_SCORE: 29
ADLS_ACUITY_SCORE: 29
ORAL_HYGIENE: INDEPENDENT
ADLS_ACUITY_SCORE: 29
HYGIENE/GROOMING: INDEPENDENT

## 2023-11-23 NOTE — CARE PLAN
"   11/23/23 1329   Group Therapy Session   Group Attendance attended group session   Time Session Began 1115   Time Session Ended 1205   Total Time (minutes) 50   Total # Attendees 5   Group Type psychotherapeutic   Group Topic Covered structured socialization;coping skills/lifestyle management   Group Session Detail Process art, holiday/ gratitude/ feelings / coping and connection   Patient Response/Contribution cooperative with task;discussed personal experience with topic;expressed readiness to alter behaviors;listened actively;offered helpful suggestions to peers   Patient Participation Detail mood \" trying to use readical acceptance about that I may need a CADI waiver to get into a group home.     Pt is accepting of IRTS placement and will \" try \" then to go home and \" see how I do.\" Writer reminded her that she had learned how to use new creative skills to cope with emotions which have been helpful in this stay. Also,she is getting back to poetry and art, and the social friendships and new medications, she is looking brighter. She also shared a poem she had written which was quite vulnerable and writer inquired how she decied to do that and she said she is hoping people can relate to emotions in the poetry, ie self esteem, loneliness, deriving worth from external sources. She seems to be having some new insights about this and working on making a shift in how she kelsy.  "

## 2023-11-23 NOTE — PLAN OF CARE
Goal Outcome Evaluation:    Plan of Care Reviewed With: patient      Pt denied all psych symptoms. Pt spent the most part of her time in the lounge watching TV with peers. She was med compliant, and oral intake was good. Pt now has order for blood glucose checks. BG before dinner was 114. Tylenol was given once this evening for headache with good effects reported. There was no S.I behavior observed or reported during this shift. Pt is in bed at this time.

## 2023-11-23 NOTE — PLAN OF CARE
"Mood and Affect: At the start of the shift, patient presented as sad, teary, and agitated with \"self\" for not being strong enough, expressing feelings of hopelessness, powerlessness, loneliness and sadness. Affect is blunted and flat.    LOC and Orientation: Alert. Oriented to person, place, time and situation.    Behavior and Interaction:Patient remained withdrawn and isolative for the most part of the shift, with minimal engagement with select peers.    Patient is pleasant and cooperative with nursing assessment.    Patient went to bed early, shortly after dinner, around 19:30.    Mental Health Symptoms: Patient endorses intermittent suicidal thoughts with no specific plan. Patient reports severe anxiety and moderate depression.     Medical Concerns:No new concerns.    Other Concerns: Patient raises concerns about discharge plan. Patient prefers to discharge to an [facility] place, and hopefully transition to a group home instead of going home, where patient believes, \"I'll be alone with no one to talk to...I don't have anyone.\" Patient encouraged to share concerns with provider and CTC team.     Medication Compliant: Patient is compliant with all scheduled medication.    PRN: Zyprexa first dose; patient education handout given to patient.     Medication Side Effects: None observed or reported.    Food Intake:Fair appetite. 75% of dinner.    Elimination: Denies problems, last bowel movement, today.    Self Care: Independent. Fairly groomed.    Vital Signs: Denies pain.  BP (!) 143/83 (BP Location: Left arm)   Pulse 103   Temp 98  F (36.7  C) (Temporal)   Resp 18   Wt 106.8 kg (235 lb 8 oz)   SpO2 95%   BMI 45.99 kg/m        Problem: Depressive Signs/Symptoms  Goal: Increased Participation and Engagement (Depressive Signs/Symptoms)  Outcome: Progressing  Intervention: Facilitate Participation and Engagement  Recent Flowsheet Documentation  Taken 11/23/2023 8304 by Stephanie Voss, RN  Diversional Activity:   music   " television   puzzles     Problem: Suicidal Behavior  Goal: Suicidal Behavior is Absent or Managed  Outcome: Progressing   Goal Outcome Evaluation:    Plan of Care Reviewed With: patient

## 2023-11-23 NOTE — CARE PLAN
11/22/23 1945   Group Therapy Session   Group Attendance attended group session   Time Session Began 1720   Time Session Ended 1800   Total Time (minutes) 40   Total # Attendees 7   Group Type psychotherapeutic   Group Topic Covered coping skills/lifestyle management;structured socialization   Group Session Detail Process/ gratitude / DBT radical acceptance   Patient Response/Contribution cooperative with task;discussed personal experience with topic;listened actively   Patient Participation Detail mood ok, gratitude and art was about her son, Kt. Her affect is brightening, she  has been more social. She said she is a bit worried she needs a CADI waiver for group home. Writer and she will meet tomorrow 1:1 to explore Art Therapy / poetry which seems very helpful for her to express her feelings.

## 2023-11-23 NOTE — PLAN OF CARE
Shift Summary ([id number])  Legal Status: Voluntary   Date of admission: 11/16/23  Mental Health Status   Pt is alert and oriented x 4 (self, place,date and situation). Able to communicate needs with clear speech. Eye contact is appropriate. Mood is calm with flat affect. [place] upon approach. Cooperative.  Denies SI, SIB, AH, VH, anxiety. Still depressed ,but not suicidal. Contracts for safety. Feels safe in hospital. Pt is in touch with reality. No delusion or hallucination noted. Insight is fair.  Visible in lounge majority of day shift, watching TV and socializing with other peers. Pt participated in group activity.   Took scheduled medication with no difficulties. Tolerating medications ok. No side effect reported by the pt or observed by this writer.   Prn given: none  Physical Health Status   Moves around independently with no difficulties.   Hygiene is appropriate. Took a shower this morning.   On regular diet. Appetite adequate. Ate both breakfast and lunch. Bg 152 before breakfast.  Pt reported no problem with bowel and bladder.   Slept 6.5 hours last night.   Denies pain or any kind of discomfort.   Today's Lab orders: none  Sitting /81 & pulse 93  Standing /86 & pulse 87  Prn given: none  Precautions  Suicide   Discharge plan/Goal   Possibly to IRTS  Continue to monitor pt's status Q 15 minutes and stabilize the patient's symptoms with the use of medications and therapeutic programming.

## 2023-11-23 NOTE — PLAN OF CARE
Problem: Sleep Disturbance  Goal: Adequate Sleep/Rest  Outcome: Progressing     No medication was requested or given in this shift. Pt appeared to slept 6.5 hours based on Q 15 min rounds. Regular and unlabored respirations noted.

## 2023-11-23 NOTE — PLAN OF CARE
Goal Outcome Evaluation:    Pt was invited to afternoon group as she really enjoys poetry and the group was a poetry group. She declined as she was resting.

## 2023-11-23 NOTE — PROGRESS NOTES
"[facility], [place]   Psychiatric Progress Note        Interim History:   The patient's care was discussed with the treatment team during the daily team meeting and/or staff's chart notes were reviewed. Staff report patient slept 6.5 hours last night. She continues to attend groups and remains in the milieu for much of the day time. Reports decreased anxiety and depression, denies active Suicidal ideation.     Met with patient: she was seen in the conference room. Sveta went to the room with no problems. Told me that she met with [facility] and discussed her request to go to group home. Said that she was unaware that she would have to get funding via [employer] waiver: \"I was in group home in [place], there was no need in CADI waiver there\". Said that she and [facility] agreed to continue working on making referrals to IRTS. Sveta confirmed that Gabapentin at night was helpful for getting longer and more sound sleep. She had no more questions or concerns today.         Medications:      atorvastatin  20 mg Oral At Bedtime    DULoxetine  60 mg Oral Daily    gabapentin  300 mg Oral At Bedtime    metFORMIN  500 mg Oral Daily    risperiDONE  0.5 mg Oral At Bedtime          Allergies:     Allergies   Allergen Reactions    Wellbutrin [Bupropion]      Nausea and dizziness          Labs:     Recent Results (from the past 24 hour(s))   Glucose by meter    Collection Time: 11/22/23  5:08 PM   Result Value Ref Range    GLUCOSE BY METER POCT 114 (H) 70 - 99 mg/dL   Glucose by meter    Collection Time: 11/23/23  8:00 AM   Result Value Ref Range    GLUCOSE BY METER POCT 152 (H) 70 - 99 mg/dL            Psychiatric Examination:     /84   Pulse 86   Temp 97  F (36.1  C) (Temporal)   Resp 18   Wt 106.8 kg (235 lb 8 oz)   SpO2 97%   BMI 45.99 kg/m    Weight is 235 lbs 8 oz  Body mass index is 45.99 kg/m .    Orthostatic Vitals         Most Recent      Sitting Orthostatic /81 11/23 0850    Sitting Orthostatic " "Pulse (bpm) 93 11/23 0850    Standing Orthostatic /86 11/23 0850    Standing Orthostatic Pulse (bpm) 87 11/23 0850           Appearance: awake, alert, dressed in home clothes, and morbidly obese  Attitude: cooperative  Eye Contact: fair, improved  Mood: less anxious and depressed, self described- \"finally starting to feel happy again\"  Affect: mood congruent  Speech: more talkative  Psychomotor Behavior: no evidence of tardive dyskinesia, dystonia, or tics and physical retardation  Throught Process: logical, linear, and goal oriented  Associations: no loose associations  Thought Content: no evidence of psychotic thought and passive suicidal ideation present off and on, not today  Insight: partial  Judgement: fair  Oriented to: time, person, and place  Attention Span and Concentration: fair  Recent and Remote Memory: fair    Clinical Global Impressions  First: 6/4 11/17/2023      Most recent:            Precautions:     Behavioral Orders   Procedures    Code 1 - Restrict to Unit    Routine Programming     As clinically indicated    Status 15     Every 15 minutes.    Suicide precautions     Patients on Suicide Precautions should have a Combination Diet ordered that includes a Diet selection(s) AND a Behavioral Tray selection for Safe Tray - with utensils, or Safe Tray - NO utensils            DIagnoses:     1. Major depressive disorder, recurrent, severe without psychotic features.  2. Generalized anxiety disorder.  3. Borderline personality disorder by history.   4. PTSD.         Plan:   Patient clearly states that she would not be safe out in community and very likely to be readmitted. Met with CTC to discuss post discharge planning and referrals to IRTS were made. Cymbalta was increased to 60 mg daily on 11/16/2023 which she was agreeable with. Started on Gabapentin 300 mg at bedtime for sleep on 11/20/2023 and she reports benefit.  Will continue the rest of her PTA meds. No medication changes today " 11/23/2023. Will continue to provide support and structure.     I was present with PA student who participated in the service and in the documentation of the note. I have verified the history and personally performed the physical exam and medical decision making. I agree with the assessment and plan of care as documented in the note.     Hayes Simpson MD  [facility]       Total time spent was 26 minutes. Over 50% of times was spent counseling and coordination of care regarding coping skills, medication and discharge planning.

## 2023-11-24 LAB
GLUCOSE BLDC GLUCOMTR-MCNC: 136 MG/DL (ref 70–99)
GLUCOSE BLDC GLUCOMTR-MCNC: 192 MG/DL (ref 70–99)

## 2023-11-24 PROCEDURE — G0177 OPPS/PHP; TRAIN & EDUC SERV: HCPCS

## 2023-11-24 PROCEDURE — 99231 SBSQ HOSP IP/OBS SF/LOW 25: CPT | Performed by: PSYCHIATRY & NEUROLOGY

## 2023-11-24 PROCEDURE — 250N000013 HC RX MED GY IP 250 OP 250 PS 637: Performed by: PSYCHIATRY & NEUROLOGY

## 2023-11-24 PROCEDURE — 124N000002 HC R&B MH UMMC

## 2023-11-24 PROCEDURE — 90837 PSYTX W PT 60 MINUTES: CPT

## 2023-11-24 PROCEDURE — 90853 GROUP PSYCHOTHERAPY: CPT

## 2023-11-24 RX ADMIN — GABAPENTIN 300 MG: 300 CAPSULE ORAL at 21:56

## 2023-11-24 RX ADMIN — METFORMIN HYDROCHLORIDE 500 MG: 500 TABLET, EXTENDED RELEASE ORAL at 17:05

## 2023-11-24 RX ADMIN — RISPERIDONE 0.5 MG: 0.5 TABLET, ORALLY DISINTEGRATING ORAL at 21:56

## 2023-11-24 RX ADMIN — ATORVASTATIN CALCIUM 20 MG: 20 TABLET, FILM COATED ORAL at 21:56

## 2023-11-24 RX ADMIN — DULOXETINE HYDROCHLORIDE 60 MG: 60 CAPSULE, DELAYED RELEASE ORAL at 08:06

## 2023-11-24 RX ADMIN — Medication 3 MG: at 21:56

## 2023-11-24 ASSESSMENT — ACTIVITIES OF DAILY LIVING (ADL)
HYGIENE/GROOMING: INDEPENDENT
DRESS: INDEPENDENT
ORAL_HYGIENE: INDEPENDENT
ADLS_ACUITY_SCORE: 29
ADLS_ACUITY_SCORE: 29
HYGIENE/GROOMING: INDEPENDENT
LAUNDRY: WITH SUPERVISION
ADLS_ACUITY_SCORE: 29
ORAL_HYGIENE: INDEPENDENT
ADLS_ACUITY_SCORE: 29
ADLS_ACUITY_SCORE: 29
DRESS: INDEPENDENT
LAUNDRY: WITH SUPERVISION
ADLS_ACUITY_SCORE: 29

## 2023-11-24 NOTE — CARE PLAN
11/24/23 1433   Group Therapy Session   Group Attendance attended group session   Time Session Began 1300   Time Session Ended 1345   Total Time (minutes) 20   Total # Attendees 5   Group Type life skill;other (see comments)  (OT)   Group Topic Covered balanced lifestyle;cognitive activities;leisure exploration/use of leisure time;structured socialization   Group Session Detail OT - Topic:  Focus of group activity was use of cognitive techniques through word associations with working toward matching other players. Also opportunity to practice socialization and ways to support others through enjoyable leisure activities.   Patient Response/Contribution able to recall/repeat info presented;cooperative with task;discussed personal experience with topic;expressed understanding of topic;listened actively   Patient Participation Detail Pt was late to group. She easily engaged in the group activity. Affect was bright and she listened well to others.

## 2023-11-24 NOTE — PROGRESS NOTES
[facility], [place]   Psychiatric Progress Note        Interim History:   The patient's care was discussed with the treatment team during the daily team meeting and/or staff's chart notes were reviewed. She continues to attend groups and remains in the milieu for much of the day time. Denies active Suicidal ideation but reports some increased depression as of yesterday that has since improved.     Met with patient: she was seen in the milieu. Patient reports having difficult day yesterday and became overwhelmingly depressed at one period. She states it lasted for 3-4 hours before she felt better. She was not able to overcome the overwhelming thoughts of depression with puzzling or distracting herself. Her affect brightened when she showed me the printed picture of her son, Kt. She continues to work on a car for him in OT groups. She is hoping to meet again with CTC once they return Monday to continue discussing IRTS as she would need CADI waiver to go to group home. She had no more questions or concerns today.         Medications:      atorvastatin  20 mg Oral At Bedtime    DULoxetine  60 mg Oral Daily    gabapentin  300 mg Oral At Bedtime    metFORMIN  500 mg Oral Daily    risperiDONE  0.5 mg Oral At Bedtime          Allergies:     Allergies   Allergen Reactions    Wellbutrin [Bupropion]      Nausea and dizziness          Labs:     Recent Results (from the past 24 hour(s))   Glucose by meter    Collection Time: 11/23/23  6:16 PM   Result Value Ref Range    GLUCOSE BY METER POCT 141 (H) 70 - 99 mg/dL   Glucose by meter    Collection Time: 11/24/23  8:09 AM   Result Value Ref Range    GLUCOSE BY METER POCT 136 (H) 70 - 99 mg/dL            Psychiatric Examination:     /87 (BP Location: Left arm)   Pulse 97   Temp 97.9  F (36.6  C) (Temporal)   Resp 18   Wt 106.8 kg (235 lb 8 oz)   SpO2 92%   BMI 45.99 kg/m    Weight is 235 lbs 8 oz  Body mass index is 45.99  kg/m .    Orthostatic Vitals         Most Recent      Sitting Orthostatic /81 11/23 0850    Sitting Orthostatic Pulse (bpm) 93 11/23 0850    Standing Orthostatic /85 11/24 0821    Standing Orthostatic Pulse (bpm) 100 11/24 0821          Appearance: awake, alert, dressed in home clothes, and morbidly obese  Attitude: cooperative  Eye Contact: fair, improved  Mood: anxious and depressed, but improved  Affect: mood congruent  Speech: more talkative  Psychomotor Behavior: no evidence of tardive dyskinesia, dystonia, or tics and physical retardation  Throught Process: logical, linear, and goal oriented  Associations: no loose associations  Thought Content: no evidence of psychotic thought and passive suicidal ideation present off and on, not today  Insight: partial  Judgement: fair  Oriented to: time, person, and place  Attention Span and Concentration: fair  Recent and Remote Memory: fair    Clinical Global Impressions  First: 6/4 11/17/2023      Most recent:            Precautions:     Behavioral Orders   Procedures    Code 1 - Restrict to Unit    Routine Programming     As clinically indicated    Status 15     Every 15 minutes.    Suicide precautions     Patients on Suicide Precautions should have a Combination Diet ordered that includes a Diet selection(s) AND a Behavioral Tray selection for Safe Tray - with utensils, or Safe Tray - NO utensils            DIagnoses:     1. Major depressive disorder, recurrent, severe without psychotic features.  2. Generalized anxiety disorder.  3. Borderline personality disorder by history.   4. PTSD.         Plan:   Patient clearly states that she would not be safe out in community and very likely to be readmitted. Met with CTC to discuss post discharge planning and referrals to IRTS were made. Cymbalta was increased to 60 mg daily on 11/16/2023 which she was agreeable with. Started on Gabapentin 300 mg at bedtime for sleep on 11/20/2023 and she reports benefit. Will  continue the rest of her PTA meds. No medication changes today 11/24/2023. Will continue to provide support and structure.     I was present with PA student who participated in the service and in the documentation of the note. I have verified the history and personally performed the physical exam and medical decision making. I agree with the assessment and plan of care as documented in the note.     Hayes Simpson MD  [facility] Psychiatry       Total time spent was 26 minutes. Over 50% of times was spent counseling and coordination of care regarding coping skills, medication and discharge planning.

## 2023-11-24 NOTE — PLAN OF CARE
Problem: Sleep Disturbance  Goal: Adequate Sleep/Rest  Outcome: Progressing   Goal Outcome Evaluation:         Pt appeared asleep during all safety checks with no apparent signs of pain or discomfort.  Slept through the night with no issues of concern.

## 2023-11-24 NOTE — PLAN OF CARE
Goal Outcome Evaluation:      Duration: Met with patient for a total of 60 minutes.    Time start: 11:30 am  Time end 12:30 pm    Modality Used:DBT, Person Centered, Rapport Building, Motivational Interviewing, and Solution Focused    Goals: Reduce chronic SI ideation/ thinking and low self esteem    Pt progress: pt is self reflective and insightful, having deeply ingrained patterns from traumatic childhood. Worried abut living alone, considering CADI if possible for group home but also reflecting on if this is the best options for her.    Treatment Objective(s) Addressed:   The focus of this session was on rapport building, identifying and practicing coping strategies, processing feelings related to self esteem, living alone, break up, external validation need, identifying an appropriate aftercare plan, building distress tolerance, identifying treatment goals, building self-esteem, and exploring obstacles to safety in the community     Progress Towards Goals and Assessment of Patient:   Patient reports improving symptoms. 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 Socializing with select peers, this is hard for her but she is doing this successfully here.     Therapeutic Intervention(s):   Provided active listening, unconditional positive regard, and validation. Engaged in cognitive restructuring/ reframing, looked at common cognitive distortions and challenged negative thoughts. Engaged in guided discovery, explored patient's perspectives and helped expand them through socratic dialogue. Coached on coping techniques/relaxation skills to help improve distress tolerance and managing intense emotions. Taught the link between thoughts, feelings, and behaviors. Reviewed healthy living that supports positive mental health, including looking at sleep hygiene, regular movement, nutrition, and  regular socialization. Identified stress relief practices. Introduced and practiced Wise Mind Introduced and practiced urge surfing. Explored motivation for behavioral change. Explored barriers to independent living.    Plan/next step: pt would like to meet with writer regularly. She is writing poetry based on the conversations we are having. She is using DBT to process intense feelings, working on wise mind and riding the wave.    She mentioned having a bad hour or two yesterday. She said she cried. She said she noticed a scissor behind the desk. Writer talked to her about being able to be safe on the unit. She validated she would not harm herself and that she has experienced chronic SI since being a teen. She said SIB , only mild scratching she reported, not in this stay but previously when writer inquired more about  her mentioning noticing a scissor on the unit. She said she is able to be safe on the unit and is feeling better today, The scissor was not in her reach but apparently she glanced at it.    Writer is giving her some DBT handouts and handouts on anxious attachment- attachment theory.

## 2023-11-24 NOTE — PROGRESS NOTES
11/23/23 1900     Group Therapy Session   Time Session Began 1730   Time Session Ended 1810   Total Time (minutes) 38   Total # Attendees 6   Group Type expressive therapy   Group Topic Covered balanced lifestyle;relaxation techniques   Group Session Detail Relaxation   Patient Response/Contribution cooperative with task   Patient Participation Detail Cooperatively engaged in Evening Music Relaxation group to decrease anxiety and promote group cohesion.  Calm affect, appropriately engaged in session, responding well to the music.  Well-engaged socially and with the music.

## 2023-11-24 NOTE — PROVIDER NOTIFICATION
11/24/23 0686   Individualization/Patient Specific Goals   Patient Personal Strengths expressive of emotions;expressive of needs;coping skills   Patient Vulnerabilities family/relationship conflict   Anxieties, Fears or Concerns Pt is afraid to live alone   Individualized Care Needs Identify interventions to targt lonliness.   Patient/Family-Specific Goals (Include Timeframe) Pt would like to go to an IRTS.   Interprofessional Rounds   Summary Pt has chronic suicidal ideation and was recently hospitalized. Pt recenlty overdosed on lithium and trazodone. Pt presented with suicidal ideation with no plan.   Participants CTC;psychiatrist;nursing;OT;other (see comments)   Behavioral Team Discussion   Participants Hayes Simpson MD; Emperatriz Nuno RN; Tali Barrios [facility] Center[facility]SIR; Radha Morton OTR/L; Edwina MORALEZ student   Progress Pt is stabilizing but remains depressed, She continues with intermittent suicidal ideation.   Anticipated length of stay 2-3 days   Continued Stay Criteria/Rationale The pt continues with suicidal ideation.   Medical/Physical No issues noted   Precautions Suicide precautions   Plan Refer to IRTS   Rationale for change in precautions or plan N/A   Safety Plan Safety plan will be completed by unit therapist.   Anticipated Discharge Disposition home or self-care;IRTS

## 2023-11-24 NOTE — PLAN OF CARE
BEH IP Unit Acuity Rating Score (UARS)  Patient is given one point for every criteria they meet.    CRITERIA SCORING   On a 72 hour hold, court hold, committed, stay of commitment, or revocation 0/1    Patient LOS on BEH unit exceeds 20 days 0/1  LOS: 8   Patient under guardianship, 55+, otherwise medically complex, or under age 11 0/1   Suicide ideation without relief of precipitating factors 1/1   Current plan for suicide 0/1   Current plan for homicide 0/1   Imminent risk or actual attempt to seriously harm another without relief of factors precipitating the attempt 0/1   Severe dysfunction in daily living (ex: complete neglect for self care, extreme disruption in vegetative function, extreme deterioration in social interactions) 0/1   Recent (last 7 days) or current physical aggression in the ED or on unit 0/1   Restraints or seclusion episode in past 72 hours 0/1   Recent (last 7 days) or current verbal aggression, agitation, yelling, etc., while in the ED or unit 0/1   Active psychosis 0/1   Need for constant or near constant redirection (from leaving, from others, etc).  0/1   Intrusive or disruptive behaviors 0/1   TOTAL 1

## 2023-11-24 NOTE — PLAN OF CARE
Patient has spent majority of the shift in the milieu. Ate breakfast and lunch. Attended groups. Denies suicidal ideation and self injurious thoughts.  Continues to have anxiety and depression but more manageable. Denies auditory and visual hallucinations. Med compliant. Cooperative on the unit.     Patient evaluation continues. Assessed mood,anxiety,thoughts and behavior.     Patient gradually progressing towards goals.    Patient is encouraged to participate in groups and assisted to develop healthy coping skills.     VS reviewed: /87 (BP Location: Left arm)   Pulse 97   Temp 97.9  F (36.6  C) (Temporal)   Resp 18   Wt 106.8 kg (235 lb 8 oz)   SpO2 92%   BMI 45.99 kg/m      Length of stay: 8    Refer to daily team meeting notes for individualized plan of care. Nursing will continue to assess.

## 2023-11-24 NOTE — CARE PLAN
11/24/23 1239   Group Therapy Session   Group Attendance attended group session   Time Session Began 1105   Time Session Ended 1150   Total Time (minutes) 45   Total # Attendees 2   Group Type life skill;other (see comments)  (OT)   Group Topic Covered balanced lifestyle;coping skills/lifestyle management;other (see comments)  (sensorimotor)   Group Session Detail OT - Coping: Group focus was learning and practice of use of a variety of sensorimotor techniques for calming and self regulation. Emphasis was on activation of the proprioceptive and vestibular senses.    Patient Response/Contribution able to recall/repeat info presented;cooperative with task;discussed personal experience with topic;expressed understanding of topic;listened actively   Patient Participation Detail Pt was active in discussion and did share some movement type activities that she likes. She easily engaged in the practice and noted benefits at end of practice.

## 2023-11-24 NOTE — CARE PLAN
11/24/23 1236   Group Therapy Session   Group Attendance attended group session   Time Session Began 1015   Time Session Ended 1100   Total Time (minutes) 45   Total # Attendees 6   Group Type life skill;task skill;other (see comments)  (OT)   Group Topic Covered balanced lifestyle;coping skills/lifestyle management;leisure exploration/use of leisure time;structured socialization   Group Session Detail OT Clinic: Activity group for creative expression, promoting autonomy, building self worth, coping with stress and symptoms, and an opportunity to exercise cognitive skills (I.e. initiation, planning, organizing, sequencing, sustained attention, follow through, and overall self awareness).   Patient Response/Contribution able to recall/repeat info presented;cooperative with task;discussed personal experience with topic;expressed understanding of topic;listened actively   Patient Participation Detail Pt independently gathered materials for her project work and worked independently. Affect was bright and she utilized humor with another patient and staff. She engaged in planning and problem solving.

## 2023-11-24 NOTE — PLAN OF CARE
Team Note Due:  Thursday    Assessment/Intervention/Current Symtoms and Care Coordination:  Chart review and met with team, discussed pt progress, symptomology, and response to treatment.  Discussed the discharge plan and any potential impediments to discharge.    Coverage Note:  There were no request for .    Discharge Plan or Goal:  Current plan is to stabilize symptoms and develop safe disposition plan. Following hospitalization, plan is to discharge home with outpatient support or to an IRTS.     Barriers to Discharge:  Pt was recently admitted and continues to stabilize.     Referral Status:  Pt currently has outpatient providers that support her. Will schedule follow-up appointments closer to discharge.    IRTS Referrals:  [facility]  Address: [address] [place] [id number]  Phone: [phone]    [facility] - Submitted 11/22/2023   Phone: [phone]    Fax: [phone]   Email: [email or web address]   11/22/2023: Staff from [facility] called to confirm receipt of referral. Noted they currently have a waitlist and to call back Monday to speak with [facility]. - Submitted 11/22/2023  [facility])  Direct: [phone]     Referral Fax: [phone]     Legal Status:  Pt is voluntary    Contacts:  Medication Management/Psychiatry:  Name/Clinic: Nkechi Hendrix NP  [facility] *Pt attends via telehealth*  Address: [address] [place] [address]   Phone: [phone]   Fax: [phone]      Therapist:   Name/Clinic: ERNESTO Pandya  [facility]   Address: [address], [place] [address] *Pt attends via telehealth*  Number: [phone]     DBT Groups:  Clinic: [facility] *Pt attends via telehealth*  Schedule: 9am - 12pm Monday, Tuesday, Thursday  Phone: [phone]    /ACT Team:  Name/Clinic: [facility]  [facility] [phone]   Number: Unknown     Upcoming Meetings and Dates/Important Information and next steps:  None

## 2023-11-25 LAB
GLUCOSE BLDC GLUCOMTR-MCNC: 122 MG/DL (ref 70–99)
GLUCOSE BLDC GLUCOMTR-MCNC: 129 MG/DL (ref 70–99)

## 2023-11-25 PROCEDURE — 250N000013 HC RX MED GY IP 250 OP 250 PS 637: Performed by: PSYCHIATRY & NEUROLOGY

## 2023-11-25 PROCEDURE — 124N000002 HC R&B MH UMMC

## 2023-11-25 PROCEDURE — G0177 OPPS/PHP; TRAIN & EDUC SERV: HCPCS

## 2023-11-25 PROCEDURE — H2032 ACTIVITY THERAPY, PER 15 MIN: HCPCS

## 2023-11-25 RX ADMIN — GABAPENTIN 300 MG: 300 CAPSULE ORAL at 20:34

## 2023-11-25 RX ADMIN — RISPERIDONE 0.5 MG: 0.5 TABLET, ORALLY DISINTEGRATING ORAL at 20:35

## 2023-11-25 RX ADMIN — DULOXETINE HYDROCHLORIDE 60 MG: 60 CAPSULE, DELAYED RELEASE ORAL at 08:25

## 2023-11-25 RX ADMIN — HYDROXYZINE HYDROCHLORIDE 50 MG: 25 TABLET ORAL at 18:23

## 2023-11-25 RX ADMIN — METFORMIN HYDROCHLORIDE 500 MG: 500 TABLET, EXTENDED RELEASE ORAL at 17:58

## 2023-11-25 RX ADMIN — Medication 3 MG: at 20:35

## 2023-11-25 RX ADMIN — ATORVASTATIN CALCIUM 20 MG: 20 TABLET, FILM COATED ORAL at 20:34

## 2023-11-25 ASSESSMENT — ACTIVITIES OF DAILY LIVING (ADL)
ADLS_ACUITY_SCORE: 29
HYGIENE/GROOMING: INDEPENDENT
ADLS_ACUITY_SCORE: 29
DRESS: INDEPENDENT
ORAL_HYGIENE: INDEPENDENT
ADLS_ACUITY_SCORE: 29
LAUNDRY: WITH SUPERVISION
ADLS_ACUITY_SCORE: 29
ORAL_HYGIENE: INDEPENDENT
DRESS: INDEPENDENT
ADLS_ACUITY_SCORE: 29
ADLS_ACUITY_SCORE: 29
HYGIENE/GROOMING: INDEPENDENT
ADLS_ACUITY_SCORE: 29

## 2023-11-25 NOTE — CARE PLAN
"   11/24/23 1956   Group Therapy Session   Group Attendance attended group session   Time Session Began 1720   Time Session Ended 1800   Total Time (minutes) 40   Total # Attendees 5   Group Type psychotherapeutic   Group Topic Covered cognitive therapy techniques   Group Session Detail CBT socratic questioning process group   Patient Response/Contribution cooperative with task;discussed personal experience with topic;expressed reluctance to alter behaviors;listened actively   Patient Participation Detail mood content, happy with project she made in OT for her son. She fully participated in written assignment. Her thinking is very rigid in terms of can she get better and overcome \" childhood trauma.\" She was open to this cognitive reframing exercise, but isnt confident she can improve enough to live independently and stay out of hospital.       "

## 2023-11-25 NOTE — PROGRESS NOTES
11/25/23 1511   Group Therapy Session   Group Attendance attended group session   Time Session Began 1115   Time Session Ended 1200   Total Time (minutes) 45   Total # Attendees 7   Group Type recreation   Group Session Detail Healthy leisure opportunity with hands on [employer] Card Game for concentration, sequencing, simple problem solving, formulating simple strategy, light cognitive processing, coping, healthy leisure exploration, mood stabilization, reality-based activity, encouraging a balanced daily routine, an opportunity to experience success, and socialization.   Patient Participation Detail Pt was pleasant, engaged and social with peers.  Pt was able to learn a simple activity quickly and was helpful to peers when needed.  Pt demonstrated good frustration tolerance as her luck at the activity wasn't as good as some of her peers.  Pt demonstrated a good sense of humor and was able to joke appropriately with others in the group.

## 2023-11-25 NOTE — PLAN OF CARE
RN Assessment:    Pt presented with euthymic affect. Pt was cooperative while interacting with the writer. Pt was alert and oriented x 4. Pt denied having SI, HI, thoughts of SIB, and hallucinations. Pt denied having physical pain. Pt had no new medical concerns. Pt endorsed sleeping well last night. Pt feels the medications that are currently ordered are working well. No medications side effects endorsed by pt or observed by writer. Pt was intermittently present in the milieu. Continue to monitor for safety and changes in medical condition.     Late in the shift pt approached writer as stated she was suicidal. After talking with pt, pt clarified that she was not suicidal, but she felt she may be better off dead. Pt stated she felt this way due to not knowing if she should give up her apartment to go to a group home. Writer validated pt's feelings and provided reassurance. Writer had a lengthy conversation with pt about the situation. Prior to the conversation ending pt advised she was no longer feeling that she would be better off dead. Pt denied having SI. Pt stated she was feeling safe.     Pt showered this shift.      Blood Glucose Monitorin: 122

## 2023-11-25 NOTE — PLAN OF CARE
Patient has spent majority of the shift in the milieu. Attended the evening group. Denies suicidal ideation and self injurious thoughts. Continues to have anxiety and depression. Affect has been flat. Brightens on approach. Ate dinner and snack. Med compliant.    Patient evaluation continues. Assessed mood,anxiety,thoughts and behavior.     Patient gradually progressing towards goals.    Patient is encouraged to participate in groups and assisted to develop healthy coping skills.     VS reviewed: BP (!) 145/83 (BP Location: Left arm)   Pulse 87   Temp 98.3  F (36.8  C) (Temporal)   Resp 18   Wt 106.8 kg (235 lb 8 oz)   SpO2 97%   BMI 45.99 kg/m      Length of stay: 8    Refer to daily team meeting notes for individualized plan of care. Nursing will continue to assess.

## 2023-11-26 LAB
GLUCOSE BLDC GLUCOMTR-MCNC: 151 MG/DL (ref 70–99)
GLUCOSE BLDC GLUCOMTR-MCNC: 194 MG/DL (ref 70–99)

## 2023-11-26 PROCEDURE — 250N000013 HC RX MED GY IP 250 OP 250 PS 637: Performed by: PSYCHIATRY & NEUROLOGY

## 2023-11-26 PROCEDURE — 124N000002 HC R&B MH UMMC

## 2023-11-26 RX ADMIN — GABAPENTIN 300 MG: 300 CAPSULE ORAL at 21:37

## 2023-11-26 RX ADMIN — METFORMIN HYDROCHLORIDE 500 MG: 500 TABLET, EXTENDED RELEASE ORAL at 17:43

## 2023-11-26 RX ADMIN — DULOXETINE HYDROCHLORIDE 60 MG: 60 CAPSULE, DELAYED RELEASE ORAL at 08:30

## 2023-11-26 RX ADMIN — RISPERIDONE 0.5 MG: 0.5 TABLET, ORALLY DISINTEGRATING ORAL at 21:37

## 2023-11-26 RX ADMIN — ATORVASTATIN CALCIUM 20 MG: 20 TABLET, FILM COATED ORAL at 21:37

## 2023-11-26 RX ADMIN — Medication 3 MG: at 21:37

## 2023-11-26 ASSESSMENT — ACTIVITIES OF DAILY LIVING (ADL)
DRESS: INDEPENDENT
ADLS_ACUITY_SCORE: 29
ORAL_HYGIENE: INDEPENDENT
ADLS_ACUITY_SCORE: 29
HYGIENE/GROOMING: INDEPENDENT
ADLS_ACUITY_SCORE: 29
HYGIENE/GROOMING: INDEPENDENT
ADLS_ACUITY_SCORE: 29
ADLS_ACUITY_SCORE: 29
LAUNDRY: WITH SUPERVISION
ADLS_ACUITY_SCORE: 29
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
## 2023-11-26 NOTE — PLAN OF CARE
Pt appeared to sleep for a total of 6.5 hours overnight. No PRN medications were administered, and no concerns were noted.     Problem: Sleep Disturbance  Goal: Adequate Sleep/Rest  Outcome: Progressing

## 2023-11-26 NOTE — PLAN OF CARE
Patient has spent majority of the shift in the milieu. Mostly keeps to self, is social with select peers. Attended the evening group. Denies suicidal ideation and self injurious thoughts. Reports having anxiety and depression. Requested prn hydroxyzine. Stated her mood had improved from early in the day. Ate dinner and snack. Blood sugar was 129. Med compliant. Cooperative on the unit.     Patient evaluation continues. Assessed mood,anxiety,thoughts and behavior.     Patient gradually progressing towards goals.    Patient is encouraged to participate in groups and assisted to develop healthy coping skills.     VS reviewed: /84 (BP Location: Right arm, Patient Position: Sitting, Cuff Size: Adult Large)   Pulse 99   Temp 97.5  F (36.4  C) (Temporal)   Resp 18   Wt 106.8 kg (235 lb 8 oz)   SpO2 95%   BMI 45.99 kg/m      Length of stay: 9    Refer to daily team meeting notes for individualized plan of care. Nursing will continue to assess.

## 2023-11-26 NOTE — CARE PLAN
"   11/25/23 1900   Group Therapy Session   Group Attendance attended group session   Time Session Began 1700   Time Session Ended 1745   Total Time (minutes) 45   Total # Attendees 6   Group Type expressive therapy   Group Topic Covered emotions/expression   Patient Response/Contribution cooperative with task       Art Therapy directive is to create \"group carousel drawings.\" Pts were encouraged to move around the room and contribute to each drawing in the group using a variety of drawing media.  Goals of directive: to assess how individual functions within a group dynamic, emotional expression, social interest, media exploration.  Pt was a quiet, engaged participant, focused on task for the full duration of group. Pt contributed positively to each group drawing. Pt was active in both group process art and verbal processing.  Pts mood was calm, pleasant participant.           "

## 2023-11-26 NOTE — PROGRESS NOTES
0000: Sveta was cooperative with room change to 305. Offered no complaints.  Room belongings were also moved.

## 2023-11-27 LAB
GLUCOSE BLDC GLUCOMTR-MCNC: 127 MG/DL (ref 70–99)
GLUCOSE BLDC GLUCOMTR-MCNC: 156 MG/DL (ref 70–99)

## 2023-11-27 PROCEDURE — 250N000013 HC RX MED GY IP 250 OP 250 PS 637: Performed by: PSYCHIATRY & NEUROLOGY

## 2023-11-27 PROCEDURE — H2032 ACTIVITY THERAPY, PER 15 MIN: HCPCS

## 2023-11-27 PROCEDURE — 90832 PSYTX W PT 30 MINUTES: CPT

## 2023-11-27 PROCEDURE — 99232 SBSQ HOSP IP/OBS MODERATE 35: CPT | Performed by: PSYCHIATRY & NEUROLOGY

## 2023-11-27 PROCEDURE — 90853 GROUP PSYCHOTHERAPY: CPT

## 2023-11-27 PROCEDURE — 124N000002 HC R&B MH UMMC

## 2023-11-27 RX ADMIN — Medication 3 MG: at 21:22

## 2023-11-27 RX ADMIN — RISPERIDONE 0.5 MG: 0.5 TABLET, ORALLY DISINTEGRATING ORAL at 21:21

## 2023-11-27 RX ADMIN — GABAPENTIN 300 MG: 300 CAPSULE ORAL at 21:21

## 2023-11-27 RX ADMIN — ATORVASTATIN CALCIUM 20 MG: 20 TABLET, FILM COATED ORAL at 21:21

## 2023-11-27 RX ADMIN — DULOXETINE HYDROCHLORIDE 60 MG: 60 CAPSULE, DELAYED RELEASE ORAL at 08:07

## 2023-11-27 RX ADMIN — HYDROXYZINE HYDROCHLORIDE 50 MG: 25 TABLET ORAL at 13:56

## 2023-11-27 RX ADMIN — METFORMIN HYDROCHLORIDE 500 MG: 500 TABLET, EXTENDED RELEASE ORAL at 18:06

## 2023-11-27 ASSESSMENT — ACTIVITIES OF DAILY LIVING (ADL)
HYGIENE/GROOMING: INDEPENDENT
ADLS_ACUITY_SCORE: 29
ADLS_ACUITY_SCORE: 29
LAUNDRY: WITH SUPERVISION
LAUNDRY: WITH SUPERVISION
ADLS_ACUITY_SCORE: 29
DRESS: INDEPENDENT
DRESS: INDEPENDENT
ORAL_HYGIENE: INDEPENDENT
ADLS_ACUITY_SCORE: 29
ORAL_HYGIENE: INDEPENDENT
ADLS_ACUITY_SCORE: 29
ADLS_ACUITY_SCORE: 29
HYGIENE/GROOMING: INDEPENDENT

## 2023-11-27 NOTE — CARE PLAN
11/27/23 1427   Group Therapy Session   Group Attendance attended group session   Time Session Began 1300   Time Session Ended 1345   Total Time (minutes) 20  (no charge)   Total # Attendees 4   Group Type life skill;task skill   Group Topic Covered coping skills/lifestyle management;problem-solving;cognitive activities   Group Session Detail [facility] for cognitive wellness, following directions, scanning, focus, turn taking, social engagement, coping skill building, symptom management   Patient Response/Contribution confronted peers appropriately   Patient Participation Detail pt shared she enjoys dot to dot activities for her brain health. pt polite during interactions with staff and peers. pt politely declined activity participation following therapist's verbal instructions and demonstration. pt sat and engaged in light conversation with tablemate.

## 2023-11-27 NOTE — PLAN OF CARE
BEH IP Unit Acuity Rating Score (UARS)  Patient is given one point for every criteria they meet.    CRITERIA SCORING   On a 72 hour hold, court hold, committed, stay of commitment, or revocation 0/1    Patient LOS on BEH unit exceeds 20 days 0/1  LOS: 11   Patient under guardianship, 55+, otherwise medically complex, or under age 11 0/1   Suicide ideation without relief of precipitating factors 1/1   Current plan for suicide 0/1   Current plan for homicide 0/1   Imminent risk or actual attempt to seriously harm another without relief of factors precipitating the attempt 0/1   Severe dysfunction in daily living (ex: complete neglect for self care, extreme disruption in vegetative function, extreme deterioration in social interactions) 0/1   Recent (last 7 days) or current physical aggression in the ED or on unit 0/1   Restraints or seclusion episode in past 72 hours 0/1   Recent (last 7 days) or current verbal aggression, agitation, yelling, etc., while in the ED or unit 0/1   Active psychosis 0/1   Need for constant or near constant redirection (from leaving, from others, etc).  0/1   Intrusive or disruptive behaviors 0/1   TOTAL 1

## 2023-11-27 NOTE — PLAN OF CARE
Problem: Adult Behavioral Health Plan of Care  Goal: Optimized Coping Skills in Response to Life Stressors  11/27/2023 1151 by Katlyn Isidro, RN  Outcome: Not Progressing  11/27/2023 1151 by Katlyn Isidro, RN  Outcome: Not Progressing     Patient is flat and blunted. She is seen out in the lounge watching TV and socializing with select peers. Attend group today. Tolerating her meals well. Showered today.    Patient has anxiety she rated 3/10 due to not knowing if she will be accepted to IRTS. Endorses depression she rated 3/10 due to being in the hospital. She stated she had some suicidal ideations over the weekend but none so far for this shift. Patient said she has more suicidal ideations in the evening due to having more time to herself. No SIB/HI. Contracted for safety. Denies auditory and visual hallucinations. Medication compliant. No prns requested.    Towards the evening patient came up to the desk crying because her boyfriend cheated on her with multiple people and her friend. AAYUSH Vaughan spoke with patient and gave her a prn hydroxyzine 50 mg. Patient said she feels safe in the hospital and contracted for safety.         /72 (Patient Position: Sitting)   Pulse 92   Temp 97.7  F (36.5  C) (Oral)   Resp 16   Wt 106.2 kg (234 lb 1.6 oz)   SpO2 94%   BMI 45.72 kg/m

## 2023-11-27 NOTE — PLAN OF CARE
Problem: Depressive Signs/Symptoms  Goal: Increased Participation and Engagement (Depressive Signs/Symptoms)  Outcome: Progressing    Problem: Adult Behavioral Health Plan of Care  Goal: Optimized Coping Skills in Response to Life Stressors  Outcome: Progressing     Patient reports that mood is slightly anxious and depressed but improved after taking hydroxyzine and meeting with therapist this afternoon. Patient states that she continues to have fleeting SI but denies any plan or thoughts at this time. She denies SIB/HI and contracts for safety.  Patient has spent time in the [employer] working on puzzles, watching tv and socializing with peers. She attended group and was out at meal times. No reports of pain or discomfort. BG at dinner time was 156. Patient is cooperative with taking medications. PRN melatonin given at bedtime.     BP (!) 141/80 (BP Location: Right arm)   Pulse 96   Temp 98  F (36.7  C) (Temporal)   Resp 16   Wt 106.2 kg (234 lb 1.6 oz)   SpO2 96%   BMI 45.72 kg/m

## 2023-11-27 NOTE — PROGRESS NOTES
"[facility], [place]   Psychiatric Progress Note        Interim History:   The patient's care was discussed with the treatment team during the daily team meeting and/or staff's chart notes were reviewed. She continues to attend groups and remains in the milieu for much of the day time. She was reported by staff to sleep 6.5 hours last night. She had a fairly good weekend, see RN's note:       \"Patient has spent majority of the shift in the milieu. Was working on a puzzle with a peer or watched television programming. Denies suicidal ideation or self injurious thoughts.. Denies auditory and visual hallucinations. Ate dinner and snack. Med compliant. Affect is flat and guarded. Pleasant and cooperative on unit.\"        Met with patient: she was seen in the milieu. Patient reports having overall good weekend except for some passive suicidal ideations on Friday and Saturday. She approached staff both times and they were able to help her calm down. When asked about what triggered these events, she said during one of the days she tried to lay down to rest but could not fall asleep and began to have overwhelming thoughts. She could not remember what triggered the other occurrence. She otherwise feels safe here and continues to attend groups and socialize with peers in the milieu.   She was excited to meet with [facility] again today to discuss progress made on IRTS referrals. She had no more questions or concerns today. She denies any medication side effects.         Medications:      atorvastatin  20 mg Oral At Bedtime    DULoxetine  60 mg Oral Daily    gabapentin  300 mg Oral At Bedtime    metFORMIN  500 mg Oral Daily    risperiDONE  0.5 mg Oral At Bedtime          Allergies:     Allergies   Allergen Reactions    Wellbutrin [Bupropion]      Nausea and dizziness          Labs:     Recent Results (from the past 24 hour(s))   Glucose by meter    Collection Time: 11/26/23  5:50 PM   Result Value Ref " Range    GLUCOSE BY METER POCT 194 (H) 70 - 99 mg/dL   Glucose by meter    Collection Time: 11/27/23  8:05 AM   Result Value Ref Range    GLUCOSE BY METER POCT 127 (H) 70 - 99 mg/dL            Psychiatric Examination:     /72 (Patient Position: Sitting)   Pulse 92   Temp 97.7  F (36.5  C) (Oral)   Resp 16   Wt 106.2 kg (234 lb 1.6 oz)   SpO2 94%   BMI 45.72 kg/m    Weight is 234 lbs 1.6 oz  Body mass index is 45.72 kg/m .    Orthostatic Vitals         Most Recent      Standing Orthostatic /82 11/27 0812    Standing Orthostatic Pulse (bpm) 87 11/27 0812          Appearance: awake, alert, dressed in home clothes, and morbidly obese  Attitude: cooperative  Eye Contact: good, improved  Mood: anxious and depressed, but improved  Affect: mood congruent  Speech: more talkative  Psychomotor Behavior: no evidence of tardive dyskinesia, dystonia, or tics and physical retardation  Throught Process: logical, linear, and goal oriented  Associations: no loose associations  Thought Content: no evidence of psychotic thought and passive suicidal ideation present off and on, not today  Insight: partial  Judgement: fair  Oriented to: time, person, and place  Attention Span and Concentration: fair  Recent and Remote Memory: fair    Clinical Global Impressions  First: 6/4 11/17/2023      Most recent:            Precautions:     Behavioral Orders   Procedures    Code 1 - Restrict to Unit    Routine Programming     As clinically indicated    Status 15     Every 15 minutes.    Suicide precautions     Patients on Suicide Precautions should have a Combination Diet ordered that includes a Diet selection(s) AND a Behavioral Tray selection for Safe Tray - with utensils, or Safe Tray - NO utensils            DIagnoses:     1. Major depressive disorder, recurrent, severe without psychotic features.  2. Generalized anxiety disorder.  3. Borderline personality disorder by history.   4. PTSD.         Plan:   Patient clearly states  that she would not be safe out in community and very likely to be readmitted. Met with CTC to discuss post discharge planning and referrals to IRTS were made. Cymbalta was increased to 60 mg daily on 11/16/2023 which she was agreeable with. Started on Gabapentin 300 mg at bedtime for sleep on 11/20/2023 and she reports benefit. Will continue the rest of her PTA meds. No medication changes today 11/27/2023. Will continue to provide support and structure.     I was present with PA student who participated in the service and in the documentation of the note. I have verified the history and personally performed the physical exam and medical decision making. I agree with the assessment and plan of care as documented in the note.     Hayes Simpson MD  [facility]       Total time spent was 25 minutes. Over 50% of times was spent counseling and coordination of care regarding coping skills, medication and discharge planning.

## 2023-11-27 NOTE — PLAN OF CARE
Team Note Due:  Thursday    Assessment/Intervention/Current Symtoms and Care Coordination:  Chart review and met with team, discussed pt progress, symptomology, and response to treatment.  Discussed the discharge plan and any potential impediments to discharge.    Tasks Completed:  - Called to check status of IRTS referrals.  - Leighann from [employer] [employer] called and scheduled phone screen with pt tomorrow. Leighann requested updated medication list for pt. Faxed over to Leighann at [phone].    Discharge Plan or Goal:  Current plan is to stabilize symptoms and develop safe disposition plan. Following hospitalization, plan is to discharge home with outpatient support or to an IRTS.     Barriers to Discharge:  Pt was recently admitted and continues to stabilize.     Referral Status:  Pt currently has outpatient providers that support her. Will schedule follow-up appointments closer to discharge.    IRTS Referrals:  [facility] - Submitted 11/22/2023  Address: [address] [place] [address] [place] [address]  Phone: [phone]  11/27/2023: Called to check status of referral. Left message for Solo who manages intakes to check status.     [facility] [place] - Submitted 11/22/2023   Phone: [phone]    Fax: [phone]   Email: [email or web address]   11/22/2023: Staff from [facility] called to confirm receipt of referral. Noted they currently have a waitlist and to call back Monday to speak with Mariaelena.  11/27/2023: Called and spoke to Brie who reported that pt is on the waitlist and there are over 30 people on the waitlist currently.    [facility] - Submitted 11/22/2023  ([facility])  Direct: [phone]     Referral Fax: [phone]  11/27/2023: Called to check status of the referral and was notified that it was assigned to Leighann (). [facility] asked Leighann to follow-up and coordinate intake call with pt as pt does not have access to her phone on the  unit. Spoke to Leighann and answered questions regarding pt. Pt is scheduled for phone screen tomorrow, Tuesday November 28 at 11 AM. Current medication list was requested. Faxed over to Leighann at [phone].     Legal Status:  Pt is voluntary    Contacts:  Medication Management/Psychiatry:  Name/Clinic: Nkechi Hendrix NP  [facility] *Pt attends via telehealth*  Address: [address] [place] [id number]   Phone: [phone]   Fax: [phone]      Therapist:   Name/Clinic: ERNESTO Pandya  [facility]   Address: [address] [place] [address] , [place] [address] *Pt attends via telehealth*  Number: [phone]     DBT Groups:  [facility] *Pt attends via telehealth*  Schedule: 9am - 12pm Monday, Tuesday, Thursday  Phone: [phone]    /ACT Team:  Name/Clinic: [facility]  or [facility] [phone]   Number: Unknown     Upcoming Meetings and Dates/Important Information and next steps:  None

## 2023-11-27 NOTE — PROGRESS NOTES
"  11/27/23 1200    Group Therapy Session   Time Session Began 1120   Time Session Ended 1230   Total Time (minutes) 70   Total # Attendees 5   Group Type expressive therapy   Group Topic Covered cognitive therapy techniques;coping skills/lifestyle management;emotions/expression   Group Session Detail Morning Relaxation   Patient Response/Contribution cooperative with task   Patient Participation Detail Cooperatively engaged in Morning Music Relaxation group to decrease anxiety and promote mental organization and mood uplift.       Pt appeared well-regulated and calm throughout session.  She's stated \"I'm excited in my mind!\" In response to the music.  She was jotting down notes and a schedule for herself, showing organization and future planning skills.       DBT skills pt used: Build Positive Experiences, Mindfulness and Interpersonal Effectiveness (with peers in session).       "

## 2023-11-27 NOTE — PLAN OF CARE
Patient has spent majority of the shift in the milieu. Was working on a puzzle with a peer or watched television programming. Denies suicidal ideation or self injurious thoughts.. Denies auditory and visual hallucinations. Ate dinner and snack. Med compliant. Affect is flat and guarded. Pleasant and cooperative on unit.     Patient evaluation continues. Assessed mood,anxiety,thoughts and behavior.     Patient gradually progressing towards goals.    Patient is encouraged to participate in groups and assisted to develop healthy coping skills.     VS reviewed: /78 (BP Location: Right arm)   Pulse 102   Temp 98.2  F (36.8  C) (Oral)   Resp 16   Wt 106.2 kg (234 lb 1.6 oz)   SpO2 95%   BMI 45.72 kg/m      Length of stay: 10    Refer to daily team meeting notes for individualized plan of care. Nursing will continue to assess.

## 2023-11-28 LAB
GLUCOSE BLDC GLUCOMTR-MCNC: 121 MG/DL (ref 70–99)
GLUCOSE BLDC GLUCOMTR-MCNC: 171 MG/DL (ref 70–99)

## 2023-11-28 PROCEDURE — 124N000002 HC R&B MH UMMC

## 2023-11-28 PROCEDURE — 99231 SBSQ HOSP IP/OBS SF/LOW 25: CPT | Performed by: PSYCHIATRY & NEUROLOGY

## 2023-11-28 PROCEDURE — 250N000013 HC RX MED GY IP 250 OP 250 PS 637: Performed by: PSYCHIATRY & NEUROLOGY

## 2023-11-28 PROCEDURE — G0177 OPPS/PHP; TRAIN & EDUC SERV: HCPCS

## 2023-11-28 RX ADMIN — RISPERIDONE 0.5 MG: 0.5 TABLET, ORALLY DISINTEGRATING ORAL at 20:18

## 2023-11-28 RX ADMIN — DULOXETINE HYDROCHLORIDE 60 MG: 60 CAPSULE, DELAYED RELEASE ORAL at 07:54

## 2023-11-28 RX ADMIN — ATORVASTATIN CALCIUM 20 MG: 20 TABLET, FILM COATED ORAL at 20:18

## 2023-11-28 RX ADMIN — HYDROXYZINE HYDROCHLORIDE 50 MG: 25 TABLET ORAL at 23:57

## 2023-11-28 RX ADMIN — METFORMIN HYDROCHLORIDE 500 MG: 500 TABLET, EXTENDED RELEASE ORAL at 17:55

## 2023-11-28 RX ADMIN — Medication 3 MG: at 20:18

## 2023-11-28 RX ADMIN — GABAPENTIN 300 MG: 300 CAPSULE ORAL at 20:18

## 2023-11-28 ASSESSMENT — ACTIVITIES OF DAILY LIVING (ADL)
ADLS_ACUITY_SCORE: 29
ADLS_ACUITY_SCORE: 29
ORAL_HYGIENE: INDEPENDENT
ADLS_ACUITY_SCORE: 29
ADLS_ACUITY_SCORE: 29
HYGIENE/GROOMING: INDEPENDENT
ADLS_ACUITY_SCORE: 29
LAUNDRY: WITH SUPERVISION
DRESS: INDEPENDENT
ADLS_ACUITY_SCORE: 29
ADLS_ACUITY_SCORE: 29

## 2023-11-28 NOTE — PLAN OF CARE
"  Problem: Depressive Signs/Symptoms  Goal: Increased Participation and Engagement (Depressive Signs/Symptoms)  Outcome: Progressing     Problem: Sleep Disturbance  Goal: Adequate Sleep/Rest  Outcome: Progressing   Goal Outcome Evaluation:    Patient alert and oriented, vials were stable. Affect bright on approach. Denied all mental health symptoms and stated, \"I am okay today\". Attended group social with peers and staff. Patient spent most of the shift sitting in the lounge watching TV with selected peers.  Patient reported eating 80% of breakfast  and lunch.    Patient will be discharging on Thursday, she was admitted  to IRTS per  patient's . Patient denied pain or any discomfort. No issues with behavior on this shift.                     "

## 2023-11-28 NOTE — PLAN OF CARE
Care Coordination Note    Tasks Completed:     Care Coordinator confirmed the following appointment:     Psychiatry appointment: Wednesday, December 6, 2023 @ 2pm via Telehealth  Provider: Nkechi Hendrix NP  Location: [facility], [address] , [place] [address], [place] [address]  Phone: [phone]  Fax: [phone]  Instructions: A link to your appointment will be sent via email ([email or web address]) and is accessible through [employer] as well.  [facility] will also call you @ [phone] the week of your appointment for confirmation.  HUC TO FAX AVS to above appointment, please    Care Coordinator scheduled the following Med Ride:     Insurance/ Name: [employer] ([phone])/[employer]  Transportation Company/ Name: [employer]/confirmed via website  Ride Confirmation # [id number]   [email or web address] Phone: [phone]   P/u Date/Time: Thursday, November 30, 2023 @ 9:30am   P/u Address: [facility], [facility] [address] [facility] [address]  Destination: [facility], [address] [place] [id number]  **Cab instructed to call Nursing Station upon arrival: [phone]    CC checked [email or web address] and confirmed the above ride listed under [phone].     CC updated CTC and AVS

## 2023-11-28 NOTE — PROGRESS NOTES
11/27/23 1800   Group Therapy Session   Group Attendance attended group session   Time Session Began 1700   Time Session Ended 1745   Total Time (minutes) 45   Total # Attendees 5   Group Type recreation   Group Topic Covered leisure exploration/use of leisure time   Group Session Detail TR leisure group   Patient Response/Contribution cooperative with task   Patient Participation Detail Pt participated in Therapeutic Recreation group with focus on leisure participation, communication skills, and critical thinking. Engaged and focused in the group recreational activity via a group game.  Pt was a full participant throughout the entire duration of group.  Pt appeared to brighten slightly with social interaction.

## 2023-11-28 NOTE — CARE PLAN
11/27/23 3103   Group Therapy Session   Group Attendance attended group session   Time Session Began 1410   Time Session Ended 1455   Total Time (minutes) 45   Total # Attendees 4   Group Type psychotherapeutic   Group Topic Covered relationship;structured socialization   Group Session Detail Healthy Boundaries   Patient Response/Contribution cooperative with task;did not share thoughts verbally;listened actively   Patient Participation Detail mood, feeling sad, emotionally overwhelmed. Writer and she agreed to meet this afternoon to process 1:1 . She was quiet, but larry some drawing of intricate patterns which seemed soothing and mindful for her. She was reportedly teary before group, and writer was asked by nursing to meet with her 1:1 today.

## 2023-11-28 NOTE — PLAN OF CARE
Goal Outcome Evaluation:      Duration: Met with patient for a total of 30  minutes.    Time start: 3:45 pm  Time end 4:15 pm    Modality Used:DBT, Person Centered, Brief Therapy, and Solution Focused    Goals: pt has trouble self regulated, negative self talk and low self esteem is deeply ingrained since childhood. She says talking and encouragement has been most helpful on the unit. Social connections have also been helpful.    Pt progress: pt said she had a rough day, thinking about the betrayal of a male friend. She was feeling shame that she dated him knowing there were red flags. Writer and she spoke about DBT, self esteem and self compassion    Treatment Objective(s) Addressed:   The focus of this session was on rapport building, identifying and practicing coping strategies, processing feelings related to betrayal by male friend in her apartment building, building distress tolerance, and building self-esteem     Progress Towards Goals and Assessment of Patient:   Patient reports improving symptoms. Patient is making progress towards treatment goals as evidenced by progres is slow and confidence in her ability to be independent and self regulate is low, but she is improving her negative thought patterns with time and encouragement..       Therapeutic Intervention(s):   Provided active listening, unconditional positive regard, and validation. Engaged in cognitive restructuring/ reframing, looked at common cognitive distortions and challenged negative thoughts. Engaged in guided discovery, explored patient's perspectives and helped expand them through socratic dialogue. Coached on coping techniques/relaxation skills to help improve distress tolerance and managing intense emotions. Taught the link between thoughts, feelings, and behaviors. Provided positive reinforcement for progress towards goals, gains in knowledge, and application of skills previously taught.  Worked on relapse prevention planning (review of  stressors, early warning signs, written plan to respond to signs, and rehearse plan). Introduced and practiced Wise Mind Introduced and practiced urge surfing. Explored strategies for self-soothing.  Explored motivation for behavioral change. Explored barriers to independent living and using DBT skills for chronic low self esteem and SI/ SIB.    Plan/next step: meet again this week and use Art Therapy to practice DBT skills and process/ self compassion and self esteem issues.

## 2023-11-28 NOTE — PLAN OF CARE
Problem: Adult Behavioral Health Plan of Care  Goal: Optimized Coping Skills in Response to Life Stressors  Outcome: Progressing     Problem: Depressive Signs/Symptoms  Goal: Increased Participation and Engagement (Depressive Signs/Symptoms)  Outcome: Progressing    Patient reported feeling a little anxious today, but is also happy about being accepted to an IRTS. Patient also reported, however, feeling a little guilty that she will able to leave before some of her peers, and worried that they were avoiding her now that she has a discharge date. Patient offered emotional support and encouragement to keep focus on her treatment goals, and to continue to share any questions or concerns about discharge with treatment team as they arise. Patient agreed, attended group as a distraction, and reported feeling somewhat better after. Patient denied having any SI/SIB/HI/AH/VH at this time, and has been able to contract for safety. Patient has been out and social with others the rest of the evening. Patient ate dinner and has been cooperative with taking medications. BG was 171. No reports of pain or other medical concerns this shift. PRN melatonin requested and given at bedtime.     /83 (BP Location: Right arm, Patient Position: Sitting, Cuff Size: Adult Large)   Pulse 118   Temp 97.5  F (36.4  C) (Temporal)   Resp 18   Wt 105.7 kg (233 lb)   SpO2 100%   BMI 45.50 kg/m

## 2023-11-28 NOTE — CARE PLAN
11/28/23 1435   Group Therapy Session   Group Attendance attended group session   Time Session Began 1015   Time Session Ended 1100   Total Time (minutes) 45   Total # Attendees 5   Group Type task skill;recreation;life skill   Group Topic Covered balanced lifestyle;coping skills/lifestyle management;leisure exploration/use of leisure time;problem-solving;self-care activities;relaxation techniques   Group Session Detail OT Clinic: open task group focused on activities to promote self-expression, independence, and leisure exploration while working to demonstrate cognitive skills   Patient Participation Detail Pt participated in an open task group to work on a scratch art project. Pt worked independently, and was focused and organized with their work. Pt appeared friendly and upbeat, as they talked with Writer about their discharge plans and upcoming son's birthday. Pt was mindful of the time, as they were to attend to a scheduled phone call, and cleaned their workspace when they prepared to leave.

## 2023-11-28 NOTE — PROGRESS NOTES
"[facility], [place]   Psychiatric Progress Note        Interim History:   The patient's care was discussed with the treatment team during the daily team meeting and/or staff's chart notes were reviewed. She continues to attend groups and remains in the milieu for much of the day time. She continues to have fleeting SI, see RN's note:    \"Patient reports that mood is slightly anxious and depressed but improved after taking hydroxyzine and meeting with therapist this afternoon. Patient states that she continues to have fleeting SI but denies any plan or thoughts at this time. She denies SIB/HI and contracts for safety.  Patient has spent time in the [employer] working on puzzles, watching tv and socializing with peers. She attended group and was out at meal times. No reports of pain or discomfort. BG at dinner time was 156. Patient is cooperative with taking medications. PRN melatonin given at bedtime.\"     Met with patient: she was seen in the conference room in presence of nursing student. Patient reports having good news as she was just accepted into an [facility]RT[facility], [facility] She hopes to be discharged Thursday, 11/30/2023. She brightened upon sharing this news with us. She notes finishing the wooden car she was working on in groups for her son, Kt. She is excited to have a planned discharge date so she can set up a time to see him. She denies any current suicidal ideation but notes an episode yesterday of fleeting suicidal thoughts. She reports the trigger for this being listening to music in group therapy and it reminded her of music she listened to with her past boyfriend who was unfaithful. She approached staff, took PRN meds and talked with staff and was able to calm down. She was also able to meet with our psychotherapist after this episode which was helpful. She notes improvement with medication as it took her less time to resolve the passive SI. She had no more " questions or concerns today. She denies any medication side effects.         Medications:      atorvastatin  20 mg Oral At Bedtime    DULoxetine  60 mg Oral Daily    gabapentin  300 mg Oral At Bedtime    metFORMIN  500 mg Oral Daily    risperiDONE  0.5 mg Oral At Bedtime          Allergies:     Allergies   Allergen Reactions    Wellbutrin [Bupropion]      Nausea and dizziness          Labs:     Recent Results (from the past 24 hour(s))   Glucose by meter    Collection Time: 11/27/23  6:10 PM   Result Value Ref Range    GLUCOSE BY METER POCT 156 (H) 70 - 99 mg/dL   Glucose by meter    Collection Time: 11/28/23  7:10 AM   Result Value Ref Range    GLUCOSE BY METER POCT 121 (H) 70 - 99 mg/dL            Psychiatric Examination:     BP (!) 141/80 (BP Location: Right arm)   Pulse 96   Temp 97.5  F (36.4  C) (Temporal)   Resp 16   Wt 105.7 kg (233 lb)   SpO2 95%   BMI 45.50 kg/m    Weight is 233 lbs 0 oz  Body mass index is 45.5 kg/m .    Orthostatic Vitals         Most Recent      Sitting Orthostatic /78 11/28 0831    Sitting Orthostatic Pulse (bpm) 83 11/28 0831    Standing Orthostatic /89 11/28 0831    Standing Orthostatic Pulse (bpm) 106 11/28 0831          Appearance: awake, alert, dressed in home clothes, and morbidly obese  Attitude: cooperative  Eye Contact: good, improved  Mood: anxious and depressed, but overall improved, excited about good news  Affect: mood congruent  Speech: more talkative  Psychomotor Behavior: no evidence of tardive dyskinesia, dystonia, or tics and physical retardation  Throught Process: logical, linear, and goal oriented  Associations: no loose associations  Thought Content: no evidence of psychotic thought and passive suicidal ideation present off and on, not today  Insight: partial  Judgement: fair  Oriented to: time, person, and place  Attention Span and Concentration: fair  Recent and Remote Memory: fair    Clinical Global Impressions  First: 6/4 11/17/2023      Most  recent:            Precautions:     Behavioral Orders   Procedures    Code 1 - Restrict to Unit    Routine Programming     As clinically indicated    Status 15     Every 15 minutes.    Suicide precautions     Patients on Suicide Precautions should have a Combination Diet ordered that includes a Diet selection(s) AND a Behavioral Tray selection for Safe Tray - with utensils, or Safe Tray - NO utensils            DIagnoses:     1. Major depressive disorder, recurrent, severe without psychotic features.  2. Generalized anxiety disorder.  3. Borderline personality disorder by history.   4. PTSD.         Plan:   Patient clearly states that she would not be safe out in community and very likely to be readmitted. Met with CTC to discuss post discharge planning and referrals to IRTS were made. She received news today that she was accepted to [facility] and expected discharge is Thursday, 11/30/2023. Cymbalta was increased to 60 mg daily on 11/16/2023 which she was agreeable with. Started on Gabapentin 300 mg at bedtime for sleep on 11/20/2023 and she reports benefit. Will continue the rest of her PTA meds. No medication changes today 11/28/2023. Will discuss discharge medications in further detail tomorrow. Will continue to provide support and structure.     I was present with PA student who participated in the service and in the documentation of the note. I have verified the history and personally performed the physical exam and medical decision making. I agree with the assessment and plan of care as documented in the note.     Hayes Simpson MD  [facility] Psychiatry       Total time spent was 25 minutes. Over 50% of times was spent counseling and coordination of care regarding coping skills, medication and discharge planning.

## 2023-11-28 NOTE — PLAN OF CARE
BEH IP Unit Acuity Rating Score (UARS)  Patient is given one point for every criteria they meet.    CRITERIA SCORING   On a 72 hour hold, court hold, committed, stay of commitment, or revocation 0/1    Patient LOS on BEH unit exceeds 20 days 0/1  LOS: 12   Patient under guardianship, 55+, otherwise medically complex, or under age 11 0/1   Suicide ideation without relief of precipitating factors 1/1   Current plan for suicide 0/1   Current plan for homicide 0/1   Imminent risk or actual attempt to seriously harm another without relief of factors precipitating the attempt 0/1   Severe dysfunction in daily living (ex: complete neglect for self care, extreme disruption in vegetative function, extreme deterioration in social interactions) 0/1   Recent (last 7 days) or current physical aggression in the ED or on unit 0/1   Restraints or seclusion episode in past 72 hours 0/1   Recent (last 7 days) or current verbal aggression, agitation, yelling, etc., while in the ED or unit 0/1   Active psychosis 0/1   Need for constant or near constant redirection (from leaving, from others, etc).  0/1   Intrusive or disruptive behaviors 0/1   TOTAL 1

## 2023-11-28 NOTE — CARE PLAN
11/28/23 1524   Group Therapy Session   Group Attendance attended group session   Time Session Began 1315   Time Session Ended 1400   Total Time (minutes) 45   Total # Attendees 2   Group Type community;recreation;life skill   Group Topic Covered cognitive activities;balanced lifestyle;leisure exploration/use of leisure time;structured socialization   Group Session Detail OT Leisure Group: Group activities to exercise cognitive skills while exploring leisure and socializing opportunities   Patient Participation Detail Pt participated in a group activity playing the board game [employer]. Pt appeared bright and social as they were open to learning and playing a game that was new to them. Pt was attentive and engaged, provided creative and novel responses for the game challenges, and would reminisce with peers about movies and restaurants brought up from the game.

## 2023-11-28 NOTE — PLAN OF CARE
Problem: Sleep Disturbance  Goal: Adequate Sleep/Rest  Outcome: Progressing  Intervention: Promote Sleep/Rest  Recent Flowsheet Documentation  Taken 11/28/2023 0207 by Rica Bailey RN  Sleep/Rest Enhancement:   awakenings minimized   noise level reduced   Goal Outcome Evaluation:      Pt was observed resting in bed comfortably. Observed respiration even and unlabored during q 15 minutes safety checks. Pt appears to have slept approximately hours. No report of pain or any other acute distress. No behavioral or safety concerns. .

## 2023-11-28 NOTE — PLAN OF CARE
Team Note Due:  Thursday    Assessment/Intervention/Current Symtoms and Care Coordination:  Chart review and met with team, discussed pt progress, symptomology, and response to treatment.  Discussed the discharge plan and any potential impediments to discharge.    Tasks Completed:  - Leighann called following phone screen with pt. Pt had noted preference for [place] location. However, there are no current openings there. Leighann shared they have immediate availability at their [place] location and could do admission this Thursday at 10 AM. Confirmed admission with Leighann. They will need: 30 day supply of medications either sent with pt or sent to [facility] in [place],  information, follow-up psychiatry appointment scheduled, and discharge summary faxed to [phone].  - Submitted request with care coordinators to schedule follow-up psychiatry appointment with pt's established provider and transportation to [facility] on Thursday morning for 9:30 AM. Appointment scheduled, AVS updated. Transportation scheduled.  - Spoke to pt to share admission to IRTS and pt reported she is open to going to [place] location. CTC shared that follow-up psychiatry appointment will be scheduled for pt.     Discharge Plan or Goal:  Current plan is to stabilize symptoms and develop safe disposition plan. Following hospitalization, plan is to discharge home with outpatient support or to an IRTS.     Barriers to Discharge:  Pt was recently admitted and continues to stabilize.     Referral Status:  Psychiatry appointment scheduled - AVS updated.    IRTS Referrals:  [facility] - Submitted 11/22/2023  Address: [address] [place] [address]  Phone: [phone]  11/27/2023: Called to check status of referral. Left message for Solo who manages intakes to check status.     [facility] - Submitted 11/22/2023   Phone: [phone]    Fax: [phone]   Email: [email or web address]    11/22/2023: Staff from [facility] called to confirm receipt of referral. Noted they currently have a waitlist and to call back Monday to speak with Mariaelena.  11/27/2023: Called and spoke to Brie who reported that pt is on the waitlist and there are over 30 people on the waitlist currently.    [facility] - Submitted 11/22/2023  ([facility])  Direct: [phone]     Referral Fax: [phone]  11/27/2023: Called to check status of the referral and was notified that it was assigned to Leighann (). [facility] asked Leighann to follow-up and coordinate intake call with pt as pt does not have access to her phone on the unit. Spoke to Leighann and answered questions regarding pt. Pt is scheduled for phone screen tomorrow, Tuesday November 28 at 11 AM. Current medication list was requested. Faxed over to Leighann at [phone].  11/28/2023: Pt approved for admission Thursday, November 30 at 10 AM.     Legal Status:  Pt is voluntary    Contacts:  Medication Management/Psychiatry:  Name/Clinic: Nkechi Hendrix NP  [facility] *Pt attends via telehealth*  Address: [address] [place] [address]   Phone: [phone]   Fax: [phone]      Therapist:   Name/Clinic: ERNESTO Pandya  [facility]   Address: [facility] [place] [address] *Pt attends via telehealth*  Number: [phone]     DBT Groups:  Clinic: [facility] *Pt attends via telehealth*  Schedule: 9am - 12pm Monday, Tuesday, Thursday  Phone: [phone]    /ACT Team:  Name/Clinic: [facility]  [facility]  or [facility] [phone]   Number: Unknown     Upcoming Meetings and Dates/Important Information and next steps:  None

## 2023-11-29 LAB
GLUCOSE BLDC GLUCOMTR-MCNC: 152 MG/DL (ref 70–99)
GLUCOSE BLDC GLUCOMTR-MCNC: 81 MG/DL (ref 70–99)

## 2023-11-29 PROCEDURE — 250N000013 HC RX MED GY IP 250 OP 250 PS 637: Performed by: PSYCHIATRY & NEUROLOGY

## 2023-11-29 PROCEDURE — 99232 SBSQ HOSP IP/OBS MODERATE 35: CPT | Performed by: PSYCHIATRY & NEUROLOGY

## 2023-11-29 PROCEDURE — 124N000002 HC R&B MH UMMC

## 2023-11-29 RX ORDER — ATORVASTATIN CALCIUM 20 MG/1
20 TABLET, FILM COATED ORAL AT BEDTIME
Qty: 30 TABLET | Refills: 0 | Status: SHIPPED | OUTPATIENT
Start: 2023-11-29

## 2023-11-29 RX ORDER — GABAPENTIN 300 MG/1
300 CAPSULE ORAL AT BEDTIME
Qty: 30 CAPSULE | Refills: 1 | Status: ON HOLD | OUTPATIENT
Start: 2023-11-29 | End: 2024-04-22

## 2023-11-29 RX ORDER — METFORMIN HCL 500 MG
500 TABLET, EXTENDED RELEASE 24 HR ORAL DAILY
Qty: 30 TABLET | Refills: 0 | Status: ON HOLD | OUTPATIENT
Start: 2023-11-29 | End: 2024-04-22

## 2023-11-29 RX ORDER — RISPERIDONE 0.5 MG/1
0.5 TABLET, ORALLY DISINTEGRATING ORAL DAILY
Qty: 30 TABLET | Refills: 1 | Status: ON HOLD | OUTPATIENT
Start: 2023-11-29 | End: 2024-04-22

## 2023-11-29 RX ORDER — HYDROXYZINE HYDROCHLORIDE 25 MG/1
25-50 TABLET, FILM COATED ORAL EVERY 6 HOURS PRN
Qty: 90 TABLET | Refills: 1 | Status: SHIPPED | OUTPATIENT
Start: 2023-11-29 | End: 2024-04-17

## 2023-11-29 RX ORDER — DULOXETIN HYDROCHLORIDE 60 MG/1
60 CAPSULE, DELAYED RELEASE ORAL DAILY
Qty: 30 CAPSULE | Refills: 1 | Status: ON HOLD | OUTPATIENT
Start: 2023-11-30 | End: 2024-04-22

## 2023-11-29 RX ORDER — LANOLIN ALCOHOL/MO/W.PET/CERES
3 CREAM (GRAM) TOPICAL
Qty: 30 TABLET | Refills: 1 | Status: SHIPPED | OUTPATIENT
Start: 2023-11-29

## 2023-11-29 RX ADMIN — Medication 3 MG: at 21:40

## 2023-11-29 RX ADMIN — HYDROXYZINE HYDROCHLORIDE 50 MG: 25 TABLET ORAL at 22:59

## 2023-11-29 RX ADMIN — DULOXETINE HYDROCHLORIDE 60 MG: 60 CAPSULE, DELAYED RELEASE ORAL at 08:16

## 2023-11-29 RX ADMIN — ATORVASTATIN CALCIUM 20 MG: 20 TABLET, FILM COATED ORAL at 21:40

## 2023-11-29 RX ADMIN — METFORMIN HYDROCHLORIDE 500 MG: 500 TABLET, EXTENDED RELEASE ORAL at 17:53

## 2023-11-29 RX ADMIN — GABAPENTIN 300 MG: 300 CAPSULE ORAL at 21:40

## 2023-11-29 RX ADMIN — RISPERIDONE 0.5 MG: 0.5 TABLET, ORALLY DISINTEGRATING ORAL at 21:40

## 2023-11-29 ASSESSMENT — ACTIVITIES OF DAILY LIVING (ADL)
HYGIENE/GROOMING: INDEPENDENT
ADLS_ACUITY_SCORE: 29
ORAL_HYGIENE: INDEPENDENT
LAUNDRY: WITH SUPERVISION
ORAL_HYGIENE: INDEPENDENT
ADLS_ACUITY_SCORE: 29
DRESS: INDEPENDENT
ADLS_ACUITY_SCORE: 29
DRESS: SCRUBS (BEHAVIORAL HEALTH);INDEPENDENT
ADLS_ACUITY_SCORE: 29
ADLS_ACUITY_SCORE: 29
HYGIENE/GROOMING: HANDWASHING;SHOWER;INDEPENDENT
ADLS_ACUITY_SCORE: 29

## 2023-11-29 NOTE — PLAN OF CARE
BEH IP Unit Acuity Rating Score (UARS)  Patient is given one point for every criteria they meet.    CRITERIA SCORING   On a 72 hour hold, court hold, committed, stay of commitment, or revocation 0/1    Patient LOS on BEH unit exceeds 20 days 0/1  LOS: 13   Patient under guardianship, 55+, otherwise medically complex, or under age 11 0/1   Suicide ideation without relief of precipitating factors 1/1   Current plan for suicide 0/1   Current plan for homicide 0/1   Imminent risk or actual attempt to seriously harm another without relief of factors precipitating the attempt 0/1   Severe dysfunction in daily living (ex: complete neglect for self care, extreme disruption in vegetative function, extreme deterioration in social interactions) 0/1   Recent (last 7 days) or current physical aggression in the ED or on unit 0/1   Restraints or seclusion episode in past 72 hours 0/1   Recent (last 7 days) or current verbal aggression, agitation, yelling, etc., while in the ED or unit 0/1   Active psychosis 0/1   Need for constant or near constant redirection (from leaving, from others, etc).  0/1   Intrusive or disruptive behaviors 0/1   TOTAL 1

## 2023-11-29 NOTE — PLAN OF CARE
Team Note Due:  Thursday    Assessment/Intervention/Current Symtoms and Care Coordination:  Chart review and met with team, discussed pt progress, symptomology, and response to treatment.  Discussed the discharge plan and any potential impediments to discharge.    Tasks Completed:  - CTC met with pt to discuss discharge to IRTS tomorrow morning. Pt reported she was feeling good about discharge and ready to go. CTC reiterated recommendation to attend DBT adherent program following IRTS and shared that a list of adherent programs will be added to pt's AVS to follow-up with. CTC received pt's  information as pt had been able to get this since previous meeting.    Discharge Plan or Goal:  Current plan is to stabilize symptoms and develop safe disposition plan. Following hospitalization, plan is to discharge home with outpatient support or to an IRTS.     Barriers to Discharge:  Pt was recently admitted and continues to stabilize.     Referral Status:  Psychiatry appointment scheduled - AVS updated.    IRTS Referrals:  [facility] - Submitted 11/22/2023  Address: [address] [place] [address] [facility] [place] [address]  Phone: [phone]  11/27/2023: Called to check status of referral. Left message for Solo who manages intakes to check status.     [facility] - Submitted 11/22/2023   Phone: [phone]    Fax: [phone]   Email: [email or web address]   11/22/2023: Staff from [facility] called to confirm receipt of referral. Noted they currently have a waitlist and to call back Monday to speak with Mariaelena.  11/27/2023: Called and spoke to Brie who reported that pt is on the waitlist and there are over 30 people on the waitlist currently.    [facility] - Submitted 11/22/2023  ([facility])  Direct: [phone]     Referral Fax: [phone]  11/27/2023: Called to check status of the referral and was notified that it was assigned to Leighann  (). [facility] asked Leighann to follow-up and coordinate intake call with pt as pt does not have access to her phone on the unit. Spoke to Leighann and answered questions regarding pt. Pt is scheduled for phone screen tomorrow, Tuesday November 28 at 11 AM. Current medication list was requested. Faxed over to Leighann at [phone].  11/28/2023: Pt approved for admission Thursday, November 30 at 10 AM.     Legal Status:  Pt is voluntary    Contacts:  Medication Management/Psychiatry:  Name/Clinic: Nkechi Hendrix NP  [facility] *Pt attends via telehealth*  Address: [address] [place] [address]   Phone: [phone]   Fax: [phone]      Therapist:   Name/Clinic: ERNESTO Pandya  [facility]   Address: [address] [place] [place] [address] *Pt attends via telehealth*  Number: [phone]     DBT Groups:  Clinic: [facility] *Pt attends via telehealth*  Schedule: 9am - 12pm Monday, Tuesday, Thursday  Phone: [phone]    /ACT Team:  Name/Clinic: [facility]    Number: [phone]     Upcoming Meetings and Dates/Important Information and next steps:  None

## 2023-11-29 NOTE — PLAN OF CARE
Problem: Sleep Disturbance  Goal: Adequate Sleep/Rest  Outcome: Progressing     Problem: Pain Acute  Goal: Optimal Pain Control and Function  Outcome: Progressing   Goal Outcome Evaluation:         Pt approached nurse at the desk at 2357, she reported racing thoughts and difficulties falling asleep. Prn hydroxyzine 50 mg given and went back to room. Pt verbalized medication was effective. Pt slept 6.75 hours, noted respiration even and unlabored. No behavioral concerns. BS 81 mg/dl at 0700. OJ offered.

## 2023-11-29 NOTE — CARE PLAN
11/29/23 1341   Group Therapy Session   Group Attendance attended group session   Time Session Began 1115   Time Session Ended 1200   Total Time (minutes) 45   Total # Attendees 7   Group Type life skill;task skill;other (see comments)  (OT)   Group Topic Covered balanced lifestyle;coping skills/lifestyle management;leisure exploration/use of leisure time;structured socialization   Group Session Detail OT Clinic: Activity group for creative expression, promoting autonomy, building self worth, coping with stress and symptoms, and an opportunity to exercise cognitive skills (I.e. initiation, planning, organizing, sequencing, sustained attention, follow through, and overall self awareness).   Patient Response/Contribution able to recall/repeat info presented;cooperative with task;expressed understanding of topic   Patient Participation Detail Pt initiated asking for help to complete her picture frame with discharge happening tomorrow. Staff assisted her with problem solving to complete the project and offered directions - demonstration and verbal for cutting her picture to fit the frame. She then asked another patient's SIO staff to help her with this step. She did continue to work to completion, even when minor issues happened. Thanked staff for the group and help.

## 2023-11-29 NOTE — PLAN OF CARE
"Goal Outcome Evaluation:      Pt is calm, pleasant, mood is \"content.\" Her affect is full range. Pt endorses anxiety rated 5/10. She said this is mainly pre discharge anxiety; states is going to an IRTS, tomorrow. Pt said she is feeling ready, and is looking forward to this. She reports she'll attend all grps, is social with effort, and is in the milieu-watching tv after bkfst. Pt states she has \"been waking up a lot,\" during the night. She attributes this to pre discharge anxiety, also. Pt's goal for the day is \"to go to grps.\"       1323) Pt has been in the milieu, attended grps, social w select peers. She has been calm, and denies needs/concerns.                      "

## 2023-11-29 NOTE — PROGRESS NOTES
[facility], [place]   Psychiatric Progress Note        Interim History:     The patient's care was discussed with the treatment team during the daily team meeting and/or staff's chart notes were reviewed. She continues to attend groups and remains in the milieu for much of the day time. Reported to be sleeping for 6.75 hours. Continues to complain of anxiety. Today said that she felt that other patients were avoiding her because she got a firm discharge date and some of them came her before here. Calmed down when emotional support was offered. Denied presence of active/passive Suicidal ideation. Contracted for safety.    Met with patient: she was seen in the conference room in presence of PA student. Patient walked into room willingly and during our visit maintained fair eye contact. She looked mildly anxious and agreed that she was worried about her discharge to IR tomorrow, but immediately after saying that said that mild anxiety was normal. Sveta, then, said that she spent some time at IRTS in [place] before, so, it was completely new place for her. We reviewed with her discharge medications. Sveta was aware that she would leave tomorrow morning. She contracted for safety and had no questions or concerns.          Medications:      atorvastatin  20 mg Oral At Bedtime    DULoxetine  60 mg Oral Daily    gabapentin  300 mg Oral At Bedtime    metFORMIN  500 mg Oral Daily    risperiDONE  0.5 mg Oral At Bedtime          Allergies:     Allergies   Allergen Reactions    Wellbutrin [Bupropion]      Nausea and dizziness          Labs:     Recent Results (from the past 24 hour(s))   Glucose by meter    Collection Time: 11/28/23  5:55 PM   Result Value Ref Range    GLUCOSE BY METER POCT 171 (H) 70 - 99 mg/dL   Glucose by meter    Collection Time: 11/29/23  7:00 AM   Result Value Ref Range    GLUCOSE BY METER POCT 81 70 - 99 mg/dL            Psychiatric Examination:     /83 (BP  Location: Right arm, Patient Position: Sitting, Cuff Size: Adult Large)   Pulse 118   Temp 97.5  F (36.4  C) (Oral)   Resp 18   Wt 105.7 kg (233 lb)   SpO2 95%   BMI 45.50 kg/m    Weight is 233 lbs 0 oz  Body mass index is 45.5 kg/m .    Orthostatic Vitals         Most Recent      Sitting Orthostatic /80 11/29 0801    Sitting Orthostatic Pulse (bpm) 88 11/29 0801    Standing Orthostatic /84 11/29 0801    Standing Orthostatic Pulse (bpm) 385 11/29 0801           Appearance: awake, alert, dressed in home clothes, and morbidly obese  Attitude: cooperative  Eye Contact: fair, improved  Mood: anxious, but overall improved, excited about discharge tomorrow  Affect: mood congruent  Speech: more talkative  Psychomotor Behavior: no evidence of tardive dyskinesia, dystonia, or tics and physical retardation  Throught Process: logical, linear, and goal oriented  Associations: no loose associations  Thought Content: no evidence of psychotic thought and passive suicidal ideation present off and on, not today  Insight: partial  Judgement: fair  Oriented to: time, person, and place  Attention Span and Concentration: fair  Recent and Remote Memory: fair    Clinical Global Impressions  First: 6/4 11/17/2023      Most recent:            Precautions:     Behavioral Orders   Procedures    Code 1 - Restrict to Unit    Routine Programming     As clinically indicated    Status 15     Every 15 minutes.    Suicide precautions     Patients on Suicide Precautions should have a Combination Diet ordered that includes a Diet selection(s) AND a Behavioral Tray selection for Safe Tray - with utensils, or Safe Tray - NO utensils            DIagnoses:     1. Major depressive disorder, recurrent, severe without psychotic features.  2. Generalized anxiety disorder.  3. Borderline personality disorder by history.   4. PTSD.         Plan:   She was accepted to [facility] and expected discharge is Thursday,  11/30/2023. No medication changes today 11/28/2023. Discharge meds were reconciled with patient and sent to [facility] discharge pharmacy. Will continue to provide support and structure.     I was present with PA student who participated in the service and in the documentation of the note. I have verified the history and personally performed the physical exam and medical decision making. I agree with the assessment and plan of care as documented in the note.     Hayes Simpson MD  [facility] Psychiatry       Total time spent was 36 minutes. Over 50% of times was spent counseling and coordination of care regarding coping skills, medication and discharge planning.

## 2023-11-30 VITALS
OXYGEN SATURATION: 95 % | WEIGHT: 233 LBS | TEMPERATURE: 97.4 F | HEART RATE: 96 BPM | BODY MASS INDEX: 45.5 KG/M2 | SYSTOLIC BLOOD PRESSURE: 113 MMHG | DIASTOLIC BLOOD PRESSURE: 77 MMHG | RESPIRATION RATE: 18 BRPM

## 2023-11-30 LAB — GLUCOSE BLDC GLUCOMTR-MCNC: 114 MG/DL (ref 70–99)

## 2023-11-30 PROCEDURE — 250N000013 HC RX MED GY IP 250 OP 250 PS 637: Performed by: PSYCHIATRY & NEUROLOGY

## 2023-11-30 PROCEDURE — 99239 HOSP IP/OBS DSCHRG MGMT >30: CPT | Performed by: PSYCHIATRY & NEUROLOGY

## 2023-11-30 RX ADMIN — DULOXETINE HYDROCHLORIDE 60 MG: 60 CAPSULE, DELAYED RELEASE ORAL at 08:19

## 2023-11-30 ASSESSMENT — ACTIVITIES OF DAILY LIVING (ADL)
ADLS_ACUITY_SCORE: 29

## 2023-11-30 NOTE — PLAN OF CARE
BEH IP Unit Acuity Rating Score (UARS)  Patient is given one point for every criteria they meet.    CRITERIA SCORING   On a 72 hour hold, court hold, committed, stay of commitment, or revocation 0/1    Patient LOS on BEH unit exceeds 20 days 0/1  LOS: 14   Patient under guardianship, 55+, otherwise medically complex, or under age 11 0/1   Suicide ideation without relief of precipitating factors 1/1   Current plan for suicide 0/1   Current plan for homicide 0/1   Imminent risk or actual attempt to seriously harm another without relief of factors precipitating the attempt 0/1   Severe dysfunction in daily living (ex: complete neglect for self care, extreme disruption in vegetative function, extreme deterioration in social interactions) 0/1   Recent (last 7 days) or current physical aggression in the ED or on unit 0/1   Restraints or seclusion episode in past 72 hours 0/1   Recent (last 7 days) or current verbal aggression, agitation, yelling, etc., while in the ED or unit 0/1   Active psychosis 0/1   Need for constant or near constant redirection (from leaving, from others, etc).  0/1   Intrusive or disruptive behaviors 0/1   TOTAL 1

## 2023-11-30 NOTE — PLAN OF CARE
Patient has spent majority of the shift in the milieu. Social with select peers. Attended the evening group. Denies suicidal ideation and self injurious thoughts. Denies anxiety and depression right now. Denies auditory and visual hallucinations. Med compliant. Ate dinner and snack. Affect is flat, pleasant on approach.      Patient evaluation continues. Assessed mood,anxiety,thoughts and behavior.     Patient gradually progressing towards goals.    Patient is encouraged to participate in groups and assisted to develop healthy coping skills.     VS reviewed: /84   Pulse 96   Temp 98.8  F (37.1  C) (Temporal)   Resp 18   Wt 105.7 kg (233 lb)   SpO2 99%   BMI 45.50 kg/m      Length of stay: 13    Refer to daily team meeting notes for individualized plan of care. Nursing will continue to assess.

## 2023-11-30 NOTE — PLAN OF CARE
Team Note Due:  Thursday    Assessment/Intervention/Current Symtoms and Care Coordination:  Chart review and met with team, discussed pt progress, symptomology, and response to treatment.  Discussed the discharge plan and any potential impediments to discharge.    Tasks Completed:  - Called Leighann at [facility] to confirm admission this morning and provide  information and follow-up psychiatry appointment. Also confirmed that medications were sent to [facility] in [place]. Confirmed fax number to send AVS.  - Faxed AVS (Fax#: [phone]) to [facility].    Discharge Plan or Goal:  Pt discharged to [facility] ([facility]) in [place] this morning at 9:30 AM.     Barriers to Discharge:  None     Referral Status:  Psychiatry appointment scheduled - AVS updated.    IRTS Referrals:  [facility] - Submitted 11/22/2023  Address: [address] [place] [address]  Phone: [phone]  11/27/2023: Called to check status of referral. Left message for Solo who manages intakes to check status.     [facility] - Submitted 11/22/2023   Phone: [phone]    Fax: [phone]   Email: [email or web address]   11/22/2023: Staff from [facility] called to confirm receipt of referral. Noted they currently have a waitlist and to call back Monday to speak with Mariaelena.  11/27/2023: Called and spoke to Brie who reported that pt is on the waitlist and there are over 30 people on the waitlist currently.    [facility] - Submitted 11/22/2023  ([facility])  Direct: [phone]     Referral Fax: [phone]  11/27/2023: Called to check status of the referral and was notified that it was assigned to Leighann (). [facility] asked Leighann to follow-up and coordinate intake call with pt as pt does not have access to her phone on the unit. Spoke to Leighann and answered questions regarding pt. Pt is scheduled for  phone screen tomorrow, Tuesday November 28 at 11 AM. Current medication list was requested. Faxed over to Leighann at [phone].  11/28/2023: Pt approved for admission Thursday, November 30 at 10 AM.     Legal Status:  Pt is voluntary    Contacts:  Medication Management/Psychiatry:  Name/Clinic: Nkechi Hendrix NP  [facility] *Pt attends via telehealth*  Address: [address] [place] [address]   Phone: [phone]   Fax: [phone]      Therapist:   Name/Clinic: ERNESTO Pandya  [facility]   Address: [address] , [place] [address] *Pt attends via telehealth*  Number: [phone]     DBT Groups:  [facility]: [facility] *Pt attends via telehealth*  Schedule: 9am - 12pm Monday, Tuesday, Thursday  Phone: [phone]    /ACT Team:  Name/Clinic: [facility]    Number: [phone]     Upcoming Meetings and Dates/Important Information and next steps:  None

## 2023-11-30 NOTE — CARE PLAN
11/29/23 1900   Group Therapy Session   Group Attendance attended group session   Time Session Began 1700   Time Session Ended 1745   Total Time (minutes) 45   Total # Attendees 6-7   Group Type expressive therapy   Group Topic Covered emotions/expression   Patient Response/Contribution cooperative with task       Art therapy directive was to identify and express through art and/or writing low energy vs high energy self care using the hazel-yang symbol as a visual metaphor to express the self care activities.  Goals of directive: exploring self care and self compassion, identifying personal strengths and goals, emotional regulation, mindfulness.  Pt was a quiet, engaged participant, focused on task for the time pt attended group. Pt identified several self care activities, both low and high energy.   Pt was an active listener in group discussion.  Pts mood was calm, pleasant participant.

## 2023-11-30 NOTE — CARE PLAN
11/29/23 1827   Group Therapy Session   Group Attendance attended group session   Time Session Began 1300   Time Session Ended 1345   Total Time (minutes) 45   Total # Attendees 5   Group Type psychotherapeutic   Group Topic Covered structured socialization;relationship   Group Session Detail Boundaries   Patient Response/Contribution cooperative with task;discussed personal experience with topic;expressed readiness to alter behaviors;listened actively   Patient Participation Detail looking forward to IRTS placement tomorrow. WOuld like to work on setting boundaries with family and friends.

## 2023-11-30 NOTE — PLAN OF CARE
Patient was in bed at the start of the shift, appeared to be comfortably asleep and breathing with ease. Patient slept for 7.0  hours of the over night shift with no s/s of acute distress.     Patient experienced no safety events or escalations during the shift, no concerns reported or observed. Safety checks completed at least every 15 minutes. Patient has a no roommate order because they remain on suicide precautions. Nursing will continue to monitor.

## 2023-11-30 NOTE — PLAN OF CARE
"  Problem: Depressive Signs/Symptoms  Goal: Increased Participation and Engagement (Depressive Signs/Symptoms)  Outcome: Adequate for Care Transition   Goal Outcome Evaluation:      Affect is full range.  Pt reports feeling anxious due to discharge.  Reports \"this is good anxiety\".  Pt given copy of her  discharge instructions and medication administration instructions. All discharge plans were discussed with patient. Pt reports no questions at this time regarding discharge plans, or medications. Pt denies any suicidal ideation, plans or intent at this time. Pt has received all her belongings. Pt was escorted to the cab by staff.                     "

## 2024-02-25 ENCOUNTER — HEALTH MAINTENANCE LETTER (OUTPATIENT)
Age: 39
End: 2024-02-25

## 2024-03-24 ENCOUNTER — HOSPITAL ENCOUNTER (EMERGENCY)
Facility: HOSPITAL | Age: 39
Discharge: NURSING, REHAB OR LONG-TERM CARE | End: 2024-03-24
Attending: EMERGENCY MEDICINE | Admitting: EMERGENCY MEDICINE
Payer: COMMERCIAL

## 2024-03-24 VITALS
RESPIRATION RATE: 22 BRPM | HEIGHT: 60 IN | OXYGEN SATURATION: 94 % | WEIGHT: 230 LBS | TEMPERATURE: 99.1 F | SYSTOLIC BLOOD PRESSURE: 142 MMHG | HEART RATE: 99 BPM | BODY MASS INDEX: 45.16 KG/M2 | DIASTOLIC BLOOD PRESSURE: 88 MMHG

## 2024-03-24 DIAGNOSIS — F32.A DEPRESSION WITH SUICIDAL IDEATION: ICD-10-CM

## 2024-03-24 DIAGNOSIS — R45.851 DEPRESSION WITH SUICIDAL IDEATION: ICD-10-CM

## 2024-03-24 PROBLEM — F60.3 BORDERLINE PERSONALITY DISORDER (H): Status: ACTIVE | Noted: 2023-11-15

## 2024-03-24 PROBLEM — F33.1 MAJOR DEPRESSIVE DISORDER, RECURRENT EPISODE, MODERATE (H): Status: ACTIVE | Noted: 2023-10-21

## 2024-03-24 PROBLEM — F33.1 MAJOR DEPRESSIVE DISORDER, RECURRENT EPISODE, MODERATE (H): Status: ACTIVE | Noted: 2019-09-20

## 2024-03-24 PROBLEM — F43.10 PTSD (POST-TRAUMATIC STRESS DISORDER): Status: ACTIVE | Noted: 2019-09-20

## 2024-03-24 LAB
ALBUMIN SERPL BCG-MCNC: 4.2 G/DL (ref 3.5–5.2)
ALBUMIN UR-MCNC: 20 MG/DL
ALP SERPL-CCNC: 96 U/L (ref 40–150)
ALT SERPL W P-5'-P-CCNC: 42 U/L (ref 0–50)
AMPHETAMINES UR QL SCN: NORMAL
APAP SERPL-MCNC: <5 UG/ML (ref 10–30)
APPEARANCE UR: ABNORMAL
AST SERPL W P-5'-P-CCNC: 27 U/L (ref 0–45)
BACTERIA #/AREA URNS HPF: ABNORMAL /HPF
BARBITURATES UR QL SCN: NORMAL
BENZODIAZ UR QL SCN: NORMAL
BILIRUB DIRECT SERPL-MCNC: <0.2 MG/DL (ref 0–0.3)
BILIRUB SERPL-MCNC: 0.3 MG/DL
BILIRUB UR QL STRIP: NEGATIVE
BZE UR QL SCN: NORMAL
CANNABINOIDS UR QL SCN: NORMAL
CAOX CRY #/AREA URNS HPF: ABNORMAL /HPF
COLOR UR AUTO: YELLOW
ETHANOL SERPL-MCNC: <0.01 G/DL
FENTANYL UR QL: NORMAL
GLUCOSE UR STRIP-MCNC: NEGATIVE MG/DL
HCG UR QL: NEGATIVE
HGB UR QL STRIP: NEGATIVE
HYALINE CASTS: 2 /LPF
KETONES UR STRIP-MCNC: NEGATIVE MG/DL
LEUKOCYTE ESTERASE UR QL STRIP: NEGATIVE
MUCOUS THREADS #/AREA URNS LPF: PRESENT /LPF
NITRATE UR QL: NEGATIVE
OPIATES UR QL SCN: NORMAL
PCP QUAL URINE (ROCHE): NORMAL
PH UR STRIP: 5.5 [PH] (ref 5–7)
PROT SERPL-MCNC: 6.9 G/DL (ref 6.4–8.3)
RBC URINE: 4 /HPF
SALICYLATES SERPL-MCNC: <0.3 MG/DL
SP GR UR STRIP: 1.03 (ref 1–1.03)
SQUAMOUS EPITHELIAL: 2 /HPF
UROBILINOGEN UR STRIP-MCNC: <2 MG/DL
WBC URINE: 6 /HPF

## 2024-03-24 PROCEDURE — 99283 EMERGENCY DEPT VISIT LOW MDM: CPT

## 2024-03-24 PROCEDURE — 80307 DRUG TEST PRSMV CHEM ANLYZR: CPT | Performed by: EMERGENCY MEDICINE

## 2024-03-24 PROCEDURE — 80179 DRUG ASSAY SALICYLATE: CPT | Performed by: EMERGENCY MEDICINE

## 2024-03-24 PROCEDURE — 81001 URINALYSIS AUTO W/SCOPE: CPT | Performed by: EMERGENCY MEDICINE

## 2024-03-24 PROCEDURE — 81025 URINE PREGNANCY TEST: CPT | Performed by: EMERGENCY MEDICINE

## 2024-03-24 PROCEDURE — 82077 ASSAY SPEC XCP UR&BREATH IA: CPT | Performed by: EMERGENCY MEDICINE

## 2024-03-24 PROCEDURE — 80143 DRUG ASSAY ACETAMINOPHEN: CPT | Performed by: EMERGENCY MEDICINE

## 2024-03-24 PROCEDURE — 36415 COLL VENOUS BLD VENIPUNCTURE: CPT | Performed by: EMERGENCY MEDICINE

## 2024-03-24 PROCEDURE — 80076 HEPATIC FUNCTION PANEL: CPT | Performed by: EMERGENCY MEDICINE

## 2024-03-24 ASSESSMENT — COLUMBIA-SUICIDE SEVERITY RATING SCALE - C-SSRS
5. HAVE YOU STARTED TO WORK OUT OR WORKED OUT THE DETAILS OF HOW TO KILL YOURSELF? DO YOU INTEND TO CARRY OUT THIS PLAN?: NO
2. HAVE YOU ACTUALLY HAD ANY THOUGHTS OF KILLING YOURSELF IN THE PAST MONTH?: YES
3. HAVE YOU BEEN THINKING ABOUT HOW YOU MIGHT KILL YOURSELF?: NO
6. HAVE YOU EVER DONE ANYTHING, STARTED TO DO ANYTHING, OR PREPARED TO DO ANYTHING TO END YOUR LIFE?: YES
1. IN THE PAST MONTH, HAVE YOU WISHED YOU WERE DEAD OR WISHED YOU COULD GO TO SLEEP AND NOT WAKE UP?: YES
4. HAVE YOU HAD THESE THOUGHTS AND HAD SOME INTENTION OF ACTING ON THEM?: YES

## 2024-03-24 ASSESSMENT — ACTIVITIES OF DAILY LIVING (ADL)
ADLS_ACUITY_SCORE: 36

## 2024-03-24 NOTE — ED PROVIDER NOTES
Emergency Department Encounter     Evaluation Date & Time:   3/24/2024  3:20 PM    CHIEF COMPLAINT:  Suicidal      Triage Note:       Impression and Plan       FINAL IMPRESSION:    ICD-10-CM    1. Depression with suicidal ideation  F32.A     R45.851             ED COURSE & MEDICAL DECISION MAKING:  3:33 PM I met the patient and performed my initial interview and exam.    38 year old female, history of PTSD with disassociation, bipolar I disorder, anxiety, depression with chronic suicidal ideation, previous suicide attempts, HLD, obesity and GERD, who presents for evaluation of intractable suicidal ideation without a plan.    She reports recently moving into a Group Home, which has not helped her mental health. She also dropped her therapist who did not want her to discuss her prior traumas. She does have a psychiatrist, who is new to her. She has been taking Cymbalta for depression, but does not feel like it has been helping.     She was recently at [facility] and they advised a partial hospitalization program, however she has not yet started it.     She denies substance abuse and drug screen and alcohol levels are negative.     Acetaminophen and salicylate levels negative with normal hepatic panel.     UPT negative. UA with asymptomatic bacteruria.     DEC consulted.  recommends discharge back to her Group Home where she will be monitored with initiation of the partial hospitalization program ([facility]) on 4/1, as scheduled. Suicidal ideation is chronic and stable.    At time of shift change,  attempting to get in touch with Group Home staff to ensure they are comfortable taking the patient back. She will need safe transport to [facility] [place].    Patient updated regarding plan. She prefers inpatient hospitalization, but is comfortable with plan.     Discharge instructions written. Patient's RN, Charge RN aware of plan. Dr. [place] to blayne patient ready for discharge after  calls  back.     Patient stable throughout ED course.       At the conclusion of the encounter I discussed the results of all the tests and the disposition. The questions were answered. The patient acknowledged understanding and was agreeable with the care plan.    Medical Decision Making    History:  Obtained supplemental history:Supplemental history obtained?: No  Reviewed external records: External records reviewed?: No    External consultation:  Consultation discussion with other provider:Did you involve another provider (consultant, MH, pharmacy, etc.)?: I discussed the care with another health care provider, see documentation for details. Crisis  / Social Work    Complicating factors:  Care impacted by chronic illness:Hyperlipidemia and Mental Health  Care significantly affected by social determinants of health:N/A    Disposition considerations: Discharge. No recommendations on prescription strength medication(s). I considered admission, but ultimately discharged patient after reassuring evaluation and DEC assessment.    MEDICATIONS GIVEN IN THE EMERGENCY DEPARTMENT:  Medications - No data to display    NEW PRESCRIPTIONS STARTED AT TODAY'S ED VISIT:  New Prescriptions    No medications on file       HPI     HPI     Sveta Cuellar is a 38 year old female, history of PTSD with disassociation, bipolar I disorder, anxiety, depression with previous suicide attempts, HLD, obesity and GERD, who presents to this ED via EMS for evaluation of suicidal ideation.    The patient has a history of suicidal ideation with 6 prior suicide attempts; her last attempt was by overdose of prescription medications in October 2023.     The patient moved into a group home on March 6 (~3 weeks ago) due to difficulty living independently and is still in the process of moving into that location. She states that such a big change and loss of full independence has been hard on her can has worsened her mental health issues. She states  that she has constant suicidal ideation without any improvement. She denies that she has a plan.     She reports recent hospitalization at [facility] and they advised a partial hospitalization program, however she has not started it.     She is on Cymbalta for her depression, which she does not think has been helping at all. She recently changed to a new psychiatrist who did genetic testing to determine drug efficacy. She started antidepressants at 9 years old and that medication is now less effective. She has also done ECT without improvement.     She also recently dropped her therapist because her therapist would not let her talk about her prior traumas.     The patient has no substance abuse or alcohol use. She denies access to a firearm. She is not employed.     She states that she put her son up for adoption, talks to her sister, and has no other friends.    She otherwise denies chest pain, shortness of breath, abdominal pain, nausea / vomiting, diarrhea, cough, fever or other concerns.    LNMP unknown due to Nexplanon.     REVIEW OF SYSTEMS:  All other systems reviewed and are negative.      Medical History     Past Medical History:   Diagnosis Date    Depression        Past Surgical History:   Procedure Laterality Date    GI SURGERY         History reviewed. No pertinent family history.    Social History     Tobacco Use    Smoking status: Never    Smokeless tobacco: Never   Substance Use Topics    Alcohol use: Not Currently    Drug use: Never       atorvastatin (LIPITOR) 20 MG tablet  DULoxetine (CYMBALTA) 60 MG capsule  gabapentin (NEURONTIN) 300 MG capsule  hydrOXYzine (ATARAX) 25 MG tablet  melatonin 3 MG tablet  Melatonin 3 MG TBDP  metFORMIN (GLUCOPHAGE XR) 500 MG 24 hr tablet  risperiDONE (RISPERDAL M-TABS) 0.5 MG ODT        Physical Exam     First Vitals:  Patient Vitals for the past 24 hrs:   BP Temp Temp src Pulse Resp SpO2 Height Weight   03/24/24 1524 126/82 98.4  F (36.9  C) Oral 90 18 96 % 1.524 m  (5') 104.3 kg (230 lb)       PHYSICAL EXAM:   Physical Exam    GENERAL: Awake, alert.  In no acute distress.   HEENT: Normocephalic, atraumatic.   NECK: No stridor.  PULMONARY: Symmetrical breath sounds without distress.  Lungs clear to auscultation bilaterally without wheezes, rhonchi or rales.  CARDIO: Regular rate and rhythm.  No significant murmur, rub or gallop.    ABDOMINAL: Abdomen soft, non-distended and non-tender to palpation.    EXTREMITIES: No lower extremity swelling or edema.      NEURO: Alert and oriented to person, place and time.  Cranial nerves grossly intact.  No focal motor deficit.  PSYCH: Flat affect, but is cooperative and communicative. She makes good eye contact.       Results     LAB:  All pertinent labs reviewed and interpreted  Labs Ordered and Resulted from Time of ED Arrival to Time of ED Departure   ROUTINE UA WITH MICROSCOPIC REFLEX TO CULTURE - Abnormal       Result Value    Color Urine Yellow      Appearance Urine Turbid (*)     Glucose Urine Negative      Bilirubin Urine Negative      Ketones Urine Negative      Specific Gravity Urine 1.034 (*)     Blood Urine Negative      pH Urine 5.5      Protein Albumin Urine 20 (*)     Urobilinogen Urine <2.0      Nitrite Urine Negative      Leukocyte Esterase Urine Negative      Bacteria Urine Moderate (*)     Mucus Urine Present (*)     Calcium Oxalate Crystals Urine Few (*)     RBC Urine 4 (*)     WBC Urine 6 (*)     Squamous Epithelials Urine 2 (*)     Hyaline Casts Urine 2     ACETAMINOPHEN LEVEL - Abnormal    Acetaminophen <5.0 (*)    HCG QUALITATIVE URINE - Normal    hCG Urine Qualitative Negative     HEPATIC FUNCTION PANEL - Normal    Protein Total 6.9      Albumin 4.2      Bilirubin Total 0.3      Alkaline Phosphatase 96      AST 27      ALT 42      Bilirubin Direct <0.20     SALICYLATE LEVEL - Normal    Salicylate <0.3     ETHYL ALCOHOL LEVEL - Normal    Alcohol ethyl <0.01     URINE DRUG SCREEN PANEL - Normal    Amphetamines Urine  Screen Negative      Barbituates Urine Screen Negative      Benzodiazepine Urine Screen Negative      Cannabinoids Urine Screen Negative      Cocaine Urine Screen Negative      Fentanyl Qual Urine Screen Negative      Opiates Urine Screen Negative      PCP Urine Screen Negative           I, Tyler Maurer, am serving as a scribe to document services personally performed by Carmen Lara MD based on my observation and the provider's statements to me. I, Carmen Lara MD attest that Tyler Maurer is acting in a scribe capacity, has observed my performance of the services and has documented them in accordance with my direction.    Carmen Lara MD  Emergency Medicine  [facility] EMERGENCY DEPARTMENT           Carmen Lara MD  03/24/24 9636

## 2024-03-24 NOTE — ED TRIAGE NOTES
Pt presents with [facility] from a group home with suicidal ideations. Pt has hx of suicide attempt by prescription medication overdose a year ago. Pt states she does not have a plan but that the thoughts are consuming her and she can think of nothing but not being alive. Pt is her own legal guardian and has a . Recent move into the group home on 3/6. Pt is interested in an inpatient psych stay.     Triage Assessment (Adult)       Row Name 03/24/24 6169          Triage Assessment    Airway WDL WDL        Respiratory WDL    Respiratory WDL WDL        Skin Circulation/Temperature WDL    Skin Circulation/Temperature WDL WDL        Cardiac WDL    Cardiac WDL WDL        Peripheral/Neurovascular WDL    Peripheral Neurovascular WDL WDL        Cognitive/Neuro/Behavioral WDL    Cognitive/Neuro/Behavioral WDL X;mood/behavior     Mood/Behavior anxious;sad

## 2024-03-24 NOTE — DISCHARGE INSTRUCTIONS
Start your [facility] on 4/1, as scheduled. Return to the ER for worsening depression, worsening suicidal thoughts.        Aftercare Plan    > Schedule a visit with a primary care provider  [place] [address] [facility] [address], [place] [address]   ~12.9 mi  [phone]    > Follow up with your psychiatric medication management provider:   XIAO Cabrera CNP  [facility] [address] [place] [id number] [phone]    > Start [facility] as scheduled on Monday, April 1          > NEW APPOINTMENT    Date: Thursday, 3/28/2024  Time: 3:00 pm - 4:00 pm  Provider: Zhanna HERNANDEZ  Location: [facility], [address] [place] [address] [address] [address]  Phone: [phone]  Type: Therapy - Initial (In-Person)    This is an intake for our Adult DBT program with the clinical DBT director. Placement with a DBT therapist will take an additional 2-4 weeks. This is a one yr commitment of weekly individual and weekly DBT group sessions.   Please check your email in the days leading up to your appointment. We will email you intake paperwork to fill out prior to your appointment. If this paperwork is not filled out within 12 hours of your appointment, the appointment will be cancelled. Please call [phone] if you have trouble accessing your paperwork or if you need to reschedule. For info on our DBT program, visit [email or web address]           SEE ADDITIONAL PAGE FOR YOUR PERSONALIZED SAFETY PLAN      If I am feeling unsafe or I am in a crisis, I will:   Contact my established care providers   Call the National Lifeline 988   Go to the nearest emergency room   Call 911   Your [facility] has a mental health crisis team you can call 24/7: [facility] [phone]        Crisis Text Line  Text 291980  You will be connected with a trained live crisis counselor to provide support.     bette Chavez [phone] o  "liloroxana williams 442-AYUDAME micki [facility] Warm Line  Peer to peer support  Monday thru Saturday, 12 pm to 10 pm  [phone] or [phone]  Text \"Support\" to 59878     National [place] on Mental Illness (JIMY)  133.971.4796 or 1.888.JIMY.HELPS        Mental Health Apps  My3  https://Tinyboppp.org/     VirtualHopeBox  https://25eight/apps/virtual-hope-box/    Additional Information  Today you were seen by a licensed mental health professional through Triage and Transition services, [facility] ([facility])  for a crisis assessment in the Emergency Department at [facility].  It is recommended that you follow up with your established providers (psychiatrist, mental health therapist, and/or primary care doctor - as relevant) as soon as possible. Coordinators from [facility] will be calling you in the next 24-48 hours to ensure that you have the resources you need.  You can also contact [facility] coordinators directly at [phone]. You may have been scheduled for or offered an appointment with a mental health provider. [facility] maintains an extensive network of licensed behavioral health providers to connect patients with the services they need.  We do not charge providers a fee to participate in our referral network.  We match patients with providers based on a patient's specific needs, insurance coverage, and location.  Our first effort will be to refer you to a provider within your care system, and will utilize providers outside your care system as needed.     "

## 2024-03-24 NOTE — CONSULTS
"Diagnostic Evaluation Consultation  Crisis Assessment    Patient Name: Sveta Cuellar  Age:  38 year old  Legal Sex: female  Gender Identity: female  Pronouns: she/her  Race: White  Ethnicity: Choose not to answer  Language: English      Patient was assessed: Virtual: Fablistic Crisis Assessment Start Time: 1643 Crisis Assessment Stop Time: 1721  Patient location: [facility] EMERGENCY DEPARTMENT                             JNED-07    Referral Data and Chief Complaint  Sveta Cuellar presents to the ED via EMS.   Patient is presenting to the ED for the following concerns: Suicidal ideation, Worsening psychosocial stress, Anxiety.     Factors that make the mental health crisis life threatening or complex are:    Pt reports that she has been experiencing persistent SI without relief since her IP  admission last November. Pt reports that \"nothing helps\" and \"nobody really cares.\" Pt stated: \"I don't have a purpose. My life isn't worth living.\" Pt acknowledged that her SI is chronic. Pt stated: \"I've been dealing with this since I was 9 years old. I'm in pain everyday.\" Pt denied plan for harming or killing herself. Pt denied access to lethal means. Pt confirmed being always supervised at group home and staff always being available if she chooses to ask for support. Pt verbalizes feeling that \"the hospital is the only place I feel safe.\" Pt acknowledged that she just moved into her new group home about 2.5 weeks ago, on March 6, and that \"it takes a long time for me to trust people.\" Pt stated: \"They're doing nice things to help me get through the day, but I feel like it's not genuine. No one knows how I feel.\"   Pt reported frustration with her current therapist, whom she has been seeing via telehealth \"for about a year.\" Pt reported that her therapist \"won't let me talk about the trauma I'm thinking about right then because she says I need DBT skills first.\" Pt added: \"I don't " "understand how I can learn DBT skills if I'm thinking about all of this trauma right now.\"   Pt mostly calm cooperative during interview, though mood labile. Pt denied A/V hallucinations and did not appear to be responding to internal stimuli during interview. Pt responded to questions when prompted with coherent answers and appropriate detail for risk assessment. Pt was able to engage in safety and aftercare planning at time of interview, and pt fully cooperated by providing names and contact info for her outpatient care team.   Pt stated that she wants admission to IP MH unit. Pt also verbalized comprehension of writer's explanation of the POC recommendation for discharge back to group home with outpatient resources.       Informed Consent and Assessment Methods  Explained the crisis assessment process, including applicable information disclosures and limits to confidentiality, assessed understanding of the process, and obtained consent to proceed with the assessment.  Assessment methods included conducting a formal interview with patient, review of medical records, collaboration with medical staff, and obtaining relevant collateral information from family and community providers when available.  : done     Patient response to interventions: verbalizes understanding, needs reinforcement  Coping skills were attempted to reduce the crisis:  talking with group home staff     History of the Crisis   Pt evaluated in [facility] ED ([facility] 3/17, 3/3, and 2/22 with similar presentation. Pt on IP MH unit at [facility] 1/14- 1/17 for similar presentation. Pt was living in [facility] during these times. Previously, pt was IP MH on [facility] dates 11/16-11/30/2023, from which she was discharged to [facility]. Pt has had an established  through [facility] throughout, as well as established outpatient psychiatry and individual therapist.     Brief Psychosocial History  Family:  Single, Children no  Support System:  " Facility resident(s)/Staff  Employment Status:  disabled  Source of Income:  social security  Financial Environmental Concerns:  unemployed  Current Hobbies:  television/movies/videos  Barriers in Personal Life:  behavioral concerns, mental health concerns    Significant Clinical History  Current Anxiety Symptoms:  anxious  Current Depression/Trauma:  avoidance, difficulty concentrating, negativistic, crying or feels like crying, low self esteem, helplessness, hopelessness, sadness, thoughts of death/suicide, apathy  Current Somatic Symptoms:     Current Psychosis/Thought Disturbance:  displaces blame (no evidence of psychosis)  Current Eating Symptoms:   (no eating symptoms reported)  Chemical Use History:  Alcohol: None  Benzodiazepines: None  Opiates: None  Cocaine: None  Marijuana: None  Other Use: None  Withdrawal Symptoms:  (none noted)  Addictions:  (none noted)   Past diagnosis:  Depression, Personality Disorder, Suicide attempt(s), PTSD  Family history:  No known history of mental health or chemical health concerns  Past treatment:  Individual therapy, Case management, Psychiatric Medication Management, Inpatient Hospitalization  Details of most recent treatment:     Other relevant history:          Collateral Information  Is there collateral information: No    Left VMs for [employer] ([phone]). Unable to reach staff at time of consult.     Reviewed medical record, including collateral from group [facility] staff from ED visit one week ago.      Risk Assessment  [place]kin Suicide Severity Rating Scale Full Clinical Version:  Suicidal Ideation  Q1 Wish to be Dead (Lifetime): Yes  Q2 Non-Specific Active Suicidal Thoughts (Lifetime): Yes  3. Active Suicidal Ideation with any Methods (Not Plan) Without Intent to Act (Lifetime): Yes  Q4 Active Suicidal Ideation with Some Intent to Act, Without Specific Plan (Lifetime): Yes  Q5 Active Suicidal Ideation with Specific Plan and Intent  (Lifetime): Yes  Q6 Suicide Behavior (Lifetime): yes     Suicidal Behavior (Lifetime)  Actual Attempt (Lifetime): Yes  Total Number of Actual Attempts (Lifetime): 1  Has subject engaged in non-suicidal self-injurious behavior? (Lifetime): Yes  Interrupted Attempts (Lifetime): Yes  Total Number of Interrupted Attempts (Lifetime):  (pt did not report #)  Aborted or Self-Interrupted Attempt (Lifetime): Yes (pt did not report #)  Total Number of Aborted or Self-Interrupted Attempts (Lifetime):  (pt did not report #)  Preparatory Acts or Behavior (Lifetime): Yes  Total Number of Preparatory Acts (Lifetime):  (pt did not report #)    [place] Suicide Severity Rating Scale Recent:   Suicidal Ideation (Recent)  Q1 Wished to be Dead (Past Month): yes  Q2 Suicidal Thoughts (Past Month): yes  Q3 Suicidal Thought Method: no  Q4 Suicidal Intent without Specific Plan: no  Q5 Suicide Intent with Specific Plan: no  Within the Past 3 Months?: no  Level of Risk per Screen: moderate risk  Intensity of Ideation (Recent)  Frequency (Past 1 Month): Many times each day  Duration (Past 1 Month): 4-8 hours/most of day  Controllability (Past 1 Month): Can control thoughts with a lot of difficulty  Deterrents (Past 1 Month): Deterrents probably stopped you  Reasons for Ideation (Past 1 Month): Completely to end or stop the pain (You couldn't go on living with the pain or how you were feeling)  Suicidal Behavior (Recent)  Actual Attempt (Past 3 Months): No  Total Number of Actual Attempts (Past 3 Months): 0  Has subject engaged in non-suicidal self-injurious behavior? (Past 3 Months): No  Interrupted Attempts (Past 3 Months): No  Total Number of Interrupted Attempts (Past 3 Months): 0  Aborted or Self-Interrupted Attempt (Past 3 Months): No  Total Number of Aborted or Self-Interrupted Attempts (Past 3 Months): 0  Preparatory Acts or Behavior (Past 3 Months): No  Total Number of Preparatory Acts (Past 3 Months): 0    Environmental or  Psychosocial Events: helplessness/hopelessness, neither working nor attending school, social isolation, recent life events (see comment) (recent move from [facility] to [facility] 3/6/24)  Protective Factors: Protective Factors: lives in a responsibly safe and stable environment, good treatment engagement, able to access care without barriers, supportive ongoing medical and mental health care relationships, help seeking, reality testing ability    Does the patient have thoughts of harming others? Feels Like Hurting Others: no  Previous Attempt to Hurt Others: no  Is the patient engaging in sexually inappropriate behavior?: no    Is the patient engaging in sexually inappropriate behavior?  no        Mental Status Exam   Affect: Labile  Appearance: Disheveled  Attention Span/Concentration: Attentive  Eye Contact: Variable    Fund of Knowledge: Appropriate   Language /Speech Content: Fluent  Language /Speech Volume: Normal  Language /Speech Rate/Productions: Normal  Recent Memory: Intact  Remote Memory: Intact  Mood: Anxious, Depressed, Sad  Orientation to Person: Yes   Orientation to Place: Yes  Orientation to Time of Day: Yes  Orientation to Date: Yes     Situation (Do they understand why they are here?): Yes  Psychomotor Behavior: Other (please comment) (rocking slightly front to back during most of interview)  Thought Content: Other (please comment) (chronic SI, flashbacks)  Thought Form: Goal Directed, Obsessive/Perseverative       Medication  Psychotropic medications:   Medication Orders - Psychiatric (From admission, onward)      None             Current Care Team  Patient Care Team:  No Ref-Primary, Physician as PCP - General Heath Vasquez as Therapist  Maribell Esparza as   Nkechi Hendrix APRN CNP as Nurse Practitioner (Psychiatry)  Thiago - Manager as   Krzysztof Walls APRN CNP as Nurse Practitioner (Psychiatry)    Diagnosis  Patient Active Problem List   Diagnosis Code    Prenatal care  in third trimester Z34.93    PTSD (post-traumatic stress disorder) F43.10    Housing lack Z59.00    Urinary tract infection in mother during third trimester of pregnancy O23.43    Encounter for triage in pregnant patient Z36.89    Disassociation F48.8    Major depressive disorder, recurrent episode, moderate (H) F33.1    Borderline personality disorder (H) F60.3       Primary Problem This Admission  Active Hospital Problems    Borderline personality disorder (H)      PTSD (post-traumatic stress disorder)      Major depressive disorder, recurrent episode, moderate (H)        Clinical Summary and Substantiation of Recommendations   After therapeutic assessment, intervention and aftercare planning by ED care team and LMHP and in consultation with attending provider, the patient's circumstances and mental state were appropriate for outpatient management. It is the recommendation of this clinician that pt discharge with outpatient mental health support. At this time, the pt is not presenting as an acute risk to self or others due to the following factors: Denies HI; Denies suicide plan, intent, and access to lethal means; lives in supervised residence with lethal means restriction; established outpatient providers and [facility] case management; medicaiton adherent; willing to consider referral for DBT programming. As pt presents with chronic SI without plan at this time, PHP is a more appropriate level of care than the more restrictive setting of IP admission. Pt is scheduled to begin [facility] in one week on 4/1/24.      Patient coping skills attempted to reduce the crisis:  talking with group home staff    Disposition  Recommended disposition: Group Home, Individual Therapy, Medication Management, Programmatic Care        Reviewed case and recommendations with attending provider. Attending Name: Carmen Lara MD       Attending concurs with disposition: yes       Patient and/or validated legal  guardian concurs with disposition:   yes (Pt requests IP MH admission, however pt also verbalizes understanding of POC recommendation for discharge to group home)       Final disposition:  discharge    Legal status on admission: Voluntary/Patient has signed consent for treatment    Assessment Details   Total duration spent with the patient: 38 min     CPT code(s) utilized: 93136 - Psychotherapy for Crisis - 60 (30-74*) min    LANIE ADAN M.Ed., [facility], [facility]  Licensed Mental Health Professional  Triage and Transition Services - [phone]

## 2024-03-24 NOTE — ED NOTES
Bed: JNED-07  Expected date: 3/24/24  Expected time: 3:12 PM  Means of arrival: Ambulance  Comments:  Mary SOFIA

## 2024-03-25 NOTE — ED NOTES
Pt is a/o x4, VSS, states she wants to go to inpatient MH since she is still feeling suicidal. Dr. Lara informed and was informed by DEC that pt is safe to go back to her group home if group home is willing to accept pt back with close observation. Per DEC's note, unable to reach pt's group home and left a massage. Writer called group home and no answers as well, also left a message.

## 2024-03-25 NOTE — ED NOTES
After spoken to pt, she agrees to go back to her group home with outpt resources. Security called to return belongings. Transport called.

## 2024-04-17 ENCOUNTER — TELEPHONE (OUTPATIENT)
Dept: BEHAVIORAL HEALTH | Facility: CLINIC | Age: 39
End: 2024-04-17
Payer: COMMERCIAL

## 2024-04-17 ENCOUNTER — HOSPITAL ENCOUNTER (INPATIENT)
Facility: CLINIC | Age: 39
LOS: 4 days | Discharge: NURSING, REHAB OR LONG-TERM CARE | DRG: 883 | End: 2024-04-22
Attending: EMERGENCY MEDICINE | Admitting: PSYCHIATRY & NEUROLOGY
Payer: COMMERCIAL

## 2024-04-17 DIAGNOSIS — F41.1 GAD (GENERALIZED ANXIETY DISORDER): ICD-10-CM

## 2024-04-17 DIAGNOSIS — F48.8 DISASSOCIATION: ICD-10-CM

## 2024-04-17 DIAGNOSIS — F33.9 EPISODE OF RECURRENT MAJOR DEPRESSIVE DISORDER, UNSPECIFIED DEPRESSION EPISODE SEVERITY (H): ICD-10-CM

## 2024-04-17 DIAGNOSIS — E11.9 TYPE 2 DIABETES MELLITUS WITHOUT COMPLICATION, WITHOUT LONG-TERM CURRENT USE OF INSULIN (H): ICD-10-CM

## 2024-04-17 DIAGNOSIS — F60.3 BORDERLINE PERSONALITY DISORDER (H): Primary | ICD-10-CM

## 2024-04-17 DIAGNOSIS — F43.10 PTSD (POST-TRAUMATIC STRESS DISORDER): ICD-10-CM

## 2024-04-17 LAB
ALBUMIN SERPL BCG-MCNC: 4.5 G/DL (ref 3.5–5.2)
ALP SERPL-CCNC: 101 U/L (ref 40–150)
ALT SERPL W P-5'-P-CCNC: 45 U/L (ref 0–50)
AMPHETAMINES UR QL SCN: NORMAL
ANION GAP SERPL CALCULATED.3IONS-SCNC: 12 MMOL/L (ref 7–15)
AST SERPL W P-5'-P-CCNC: 31 U/L (ref 0–45)
BARBITURATES UR QL SCN: NORMAL
BASOPHILS # BLD AUTO: 0 10E3/UL (ref 0–0.2)
BASOPHILS NFR BLD AUTO: 0 %
BENZODIAZ UR QL SCN: NORMAL
BILIRUB SERPL-MCNC: 0.3 MG/DL
BUN SERPL-MCNC: 10.4 MG/DL (ref 6–20)
BZE UR QL SCN: NORMAL
CALCIUM SERPL-MCNC: 9.8 MG/DL (ref 8.6–10)
CANNABINOIDS UR QL SCN: NORMAL
CHLORIDE SERPL-SCNC: 103 MMOL/L (ref 98–107)
CREAT SERPL-MCNC: 0.61 MG/DL (ref 0.51–0.95)
DEPRECATED HCO3 PLAS-SCNC: 25 MMOL/L (ref 22–29)
EGFRCR SERPLBLD CKD-EPI 2021: >90 ML/MIN/1.73M2
EOSINOPHIL # BLD AUTO: 0.1 10E3/UL (ref 0–0.7)
EOSINOPHIL NFR BLD AUTO: 2 %
ERYTHROCYTE [DISTWIDTH] IN BLOOD BY AUTOMATED COUNT: 14.8 % (ref 10–15)
FENTANYL UR QL: NORMAL
GLUCOSE SERPL-MCNC: 86 MG/DL (ref 70–99)
HCG UR QL: NEGATIVE
HCT VFR BLD AUTO: 42.9 % (ref 35–47)
HGB BLD-MCNC: 13.6 G/DL (ref 11.7–15.7)
IMM GRANULOCYTES # BLD: 0 10E3/UL
IMM GRANULOCYTES NFR BLD: 0 %
LYMPHOCYTES # BLD AUTO: 3.7 10E3/UL (ref 0.8–5.3)
LYMPHOCYTES NFR BLD AUTO: 38 %
MAGNESIUM SERPL-MCNC: 2.1 MG/DL (ref 1.7–2.3)
MCH RBC QN AUTO: 25.3 PG (ref 26.5–33)
MCHC RBC AUTO-ENTMCNC: 31.7 G/DL (ref 31.5–36.5)
MCV RBC AUTO: 80 FL (ref 78–100)
MONOCYTES # BLD AUTO: 0.6 10E3/UL (ref 0–1.3)
MONOCYTES NFR BLD AUTO: 6 %
NEUTROPHILS # BLD AUTO: 5.1 10E3/UL (ref 1.6–8.3)
NEUTROPHILS NFR BLD AUTO: 54 %
NRBC # BLD AUTO: 0 10E3/UL
NRBC BLD AUTO-RTO: 0 /100
OPIATES UR QL SCN: NORMAL
PCP QUAL URINE (ROCHE): NORMAL
PLATELET # BLD AUTO: 251 10E3/UL (ref 150–450)
POTASSIUM SERPL-SCNC: 4 MMOL/L (ref 3.4–5.3)
PROT SERPL-MCNC: 7.5 G/DL (ref 6.4–8.3)
RBC # BLD AUTO: 5.37 10E6/UL (ref 3.8–5.2)
SODIUM SERPL-SCNC: 140 MMOL/L (ref 135–145)
TSH SERPL DL<=0.005 MIU/L-ACNC: 0.71 UIU/ML (ref 0.3–4.2)
WBC # BLD AUTO: 9.6 10E3/UL (ref 4–11)

## 2024-04-17 PROCEDURE — 99255 IP/OBS CONSLTJ NEW/EST HI 80: CPT

## 2024-04-17 PROCEDURE — 80307 DRUG TEST PRSMV CHEM ANLYZR: CPT | Performed by: EMERGENCY MEDICINE

## 2024-04-17 PROCEDURE — 99285 EMERGENCY DEPT VISIT HI MDM: CPT | Performed by: EMERGENCY MEDICINE

## 2024-04-17 PROCEDURE — 80053 COMPREHEN METABOLIC PANEL: CPT | Performed by: EMERGENCY MEDICINE

## 2024-04-17 PROCEDURE — 84443 ASSAY THYROID STIM HORMONE: CPT | Performed by: EMERGENCY MEDICINE

## 2024-04-17 PROCEDURE — 93005 ELECTROCARDIOGRAM TRACING: CPT | Performed by: EMERGENCY MEDICINE

## 2024-04-17 PROCEDURE — 81025 URINE PREGNANCY TEST: CPT | Performed by: EMERGENCY MEDICINE

## 2024-04-17 PROCEDURE — 83735 ASSAY OF MAGNESIUM: CPT | Performed by: EMERGENCY MEDICINE

## 2024-04-17 PROCEDURE — 36415 COLL VENOUS BLD VENIPUNCTURE: CPT | Performed by: EMERGENCY MEDICINE

## 2024-04-17 PROCEDURE — 250N000013 HC RX MED GY IP 250 OP 250 PS 637

## 2024-04-17 PROCEDURE — 250N000013 HC RX MED GY IP 250 OP 250 PS 637: Performed by: EMERGENCY MEDICINE

## 2024-04-17 PROCEDURE — 85025 COMPLETE CBC W/AUTO DIFF WBC: CPT | Performed by: EMERGENCY MEDICINE

## 2024-04-17 RX ORDER — DESVENLAFAXINE 50 MG/1
50 TABLET, FILM COATED, EXTENDED RELEASE ORAL DAILY
Status: ON HOLD | COMMUNITY
Start: 2024-03-19 | End: 2024-04-22

## 2024-04-17 RX ORDER — RISPERIDONE 0.5 MG/1
1 TABLET ORAL 2 TIMES DAILY
Status: DISCONTINUED | OUTPATIENT
Start: 2024-04-17 | End: 2024-04-22 | Stop reason: HOSPADM

## 2024-04-17 RX ORDER — TRAZODONE HYDROCHLORIDE 50 MG/1
50-100 TABLET, FILM COATED ORAL
Status: DISCONTINUED | OUTPATIENT
Start: 2024-04-17 | End: 2024-04-18

## 2024-04-17 RX ORDER — OLANZAPINE 10 MG/2ML
10 INJECTION, POWDER, FOR SOLUTION INTRAMUSCULAR 2 TIMES DAILY PRN
Status: DISCONTINUED | OUTPATIENT
Start: 2024-04-17 | End: 2024-04-19

## 2024-04-17 RX ORDER — GABAPENTIN 300 MG/1
300 CAPSULE ORAL EVERY 8 HOURS PRN
Status: DISCONTINUED | OUTPATIENT
Start: 2024-04-17 | End: 2024-04-20

## 2024-04-17 RX ORDER — GABAPENTIN 300 MG/1
600 CAPSULE ORAL AT BEDTIME
Status: DISCONTINUED | OUTPATIENT
Start: 2024-04-17 | End: 2024-04-22 | Stop reason: HOSPADM

## 2024-04-17 RX ORDER — TRAZODONE HYDROCHLORIDE 50 MG/1
50-100 TABLET, FILM COATED ORAL
COMMUNITY

## 2024-04-17 RX ORDER — HYDROXYZINE HYDROCHLORIDE 50 MG/1
50 TABLET, FILM COATED ORAL EVERY 6 HOURS PRN
Status: DISCONTINUED | OUTPATIENT
Start: 2024-04-17 | End: 2024-04-18

## 2024-04-17 RX ORDER — TRAZODONE HYDROCHLORIDE 50 MG/1
50 TABLET, FILM COATED ORAL
Status: DISCONTINUED | OUTPATIENT
Start: 2024-04-17 | End: 2024-04-22 | Stop reason: HOSPADM

## 2024-04-17 RX ORDER — METFORMIN HCL 500 MG
500 TABLET, EXTENDED RELEASE 24 HR ORAL
Status: DISCONTINUED | OUTPATIENT
Start: 2024-04-17 | End: 2024-04-22 | Stop reason: HOSPADM

## 2024-04-17 RX ORDER — DESVENLAFAXINE 50 MG/1
50 TABLET, FILM COATED, EXTENDED RELEASE ORAL DAILY
Status: DISCONTINUED | OUTPATIENT
Start: 2024-04-17 | End: 2024-04-22 | Stop reason: HOSPADM

## 2024-04-17 RX ORDER — OLANZAPINE 10 MG/1
10 TABLET, ORALLY DISINTEGRATING ORAL 2 TIMES DAILY PRN
Status: DISCONTINUED | OUTPATIENT
Start: 2024-04-17 | End: 2024-04-19

## 2024-04-17 RX ADMIN — HYDROXYZINE HYDROCHLORIDE 50 MG: 50 TABLET, FILM COATED ORAL at 17:54

## 2024-04-17 RX ADMIN — DESVENLAFAXINE 50 MG: 50 TABLET, FILM COATED, EXTENDED RELEASE ORAL at 17:54

## 2024-04-17 RX ADMIN — TRAZODONE HYDROCHLORIDE 50 MG: 50 TABLET ORAL at 21:36

## 2024-04-17 RX ADMIN — Medication 5 MG: at 21:35

## 2024-04-17 RX ADMIN — METFORMIN HYDROCHLORIDE 500 MG: 500 TABLET, EXTENDED RELEASE ORAL at 17:54

## 2024-04-17 RX ADMIN — RISPERIDONE 1 MG: 1 TABLET, FILM COATED ORAL at 20:57

## 2024-04-17 RX ADMIN — HYDROXYZINE HYDROCHLORIDE 50 MG: 50 TABLET, FILM COATED ORAL at 22:35

## 2024-04-17 RX ADMIN — GABAPENTIN 600 MG: 300 CAPSULE ORAL at 21:35

## 2024-04-17 ASSESSMENT — ACTIVITIES OF DAILY LIVING (ADL)
ADLS_ACUITY_SCORE: 35

## 2024-04-17 ASSESSMENT — COLUMBIA-SUICIDE SEVERITY RATING SCALE - C-SSRS
1. IN THE PAST MONTH, HAVE YOU WISHED YOU WERE DEAD OR WISHED YOU COULD GO TO SLEEP AND NOT WAKE UP?: YES
3. HAVE YOU BEEN THINKING ABOUT HOW YOU MIGHT KILL YOURSELF?: YES
6. HAVE YOU EVER DONE ANYTHING, STARTED TO DO ANYTHING, OR PREPARED TO DO ANYTHING TO END YOUR LIFE?: YES
4. HAVE YOU HAD THESE THOUGHTS AND HAD SOME INTENTION OF ACTING ON THEM?: NO
2. HAVE YOU ACTUALLY HAD ANY THOUGHTS OF KILLING YOURSELF IN THE PAST MONTH?: YES
5. HAVE YOU STARTED TO WORK OUT OR WORKED OUT THE DETAILS OF HOW TO KILL YOURSELF? DO YOU INTEND TO CARRY OUT THIS PLAN?: NO

## 2024-04-17 NOTE — ED NOTES
Report was given to [facility], pt will be transported there after eating dinner on the unit.

## 2024-04-17 NOTE — CONSULTS
Diagnostic Evaluation Consultation  Crisis Assessment    Patient Name: Sveta Cuellar  Age:  38 year old  Legal Sex: female  Gender Identity: female  Pronouns:   Race: White  Ethnicity: Choose not to answer  Language: English      Patient was assessed: In person      Patient location: [facility] EMERGENCY DEPARTMENT                             [facility]    Referral Data and Chief Complaint  Sveta Cuellar presents to the ED per community partner(s). Patient is presenting to the ED for the following concerns: Suicidal ideation, Worsening psychosocial stress, Anxiety.   Factors that make the mental health crisis life threatening or complex are:  Pt brought to the ED by staff from her group home for evaluation of suicidal ideation and high anxiety. Group home staff Teresa present for assessment at pts request.  Pt states that she has repeated thoughts and images of ways she can kill herself including making a noose, hanging herself, overdosing, jumping from a bridge and cutting her wrists.   She is scared she will act on these thoughts and that is why she came to the ED.  Yesterday she turned into staff a razor and scissors so that she would not cut herself.   She is having trouble with taking care of herself and attending to her ADL's.  She struggles to fall asleep and stay asleep.  Her coping skills of talking to staff, doing arts and crafts and puzzles are not working.  She had been taking Hydroxyzine for anxiety, dose was increased to 50mg but this was still not working so she was switched to Proprananol which gave her a headache and was stopped. She is currently not prescibed anything for anxiety,       Informed Consent and Assessment Methods  Explained the crisis assessment process, including applicable information disclosures and limits to confidentiality, assessed understanding of the process, and obtained consent to proceed with the assessment.  Assessment methods included conducting a formal  interview with patient, review of medical records, collaboration with medical staff, and obtaining relevant collateral information from family and community providers when available.  : done     Patient response to interventions: verbalizes understanding, needs reinforcement  Coping skills were attempted to reduce the crisis:  talking with group home staff     History of the Crisis   Pt with history of Major Depression, Generalized Anxiety Disorder, PTSD and Borderline Personality Disorder.     She has been hospitalized numerous times and has attempted to overdose more than 6 times.   Last admission was 1/2024.  She attended 3 sessions of partial programming through [facility] but found the group setting too stressful and discharged self from the program in early April.   She is scheduled to begin DBT therapy through [facility] next week.    She lives at [facility], with 24/7 staff. She has been at the group home since 3/2024.     Brief Psychosocial History  Family:  Single, Children no  Support System:  Sibling(s), Facility resident(s)/Staff  Employment Status:  disabled  Source of Income:  disability  Financial Environmental Concerns:  none  Current Hobbies:  television/movies/videos  Barriers in Personal Life:  behavioral concerns, mental health concerns    Significant Clinical History  Current Anxiety Symptoms:  anxious  Current Depression/Trauma:  avoidance, difficulty concentrating, negativistic, crying or feels like crying, low self esteem, helplessness, hopelessness, sadness, thoughts of death/suicide, apathy  Current Somatic Symptoms:  anxious  Current Psychosis/Thought Disturbance:  displaces blame  Current Eating Symptoms:   (Denies)  Chemical Use History:  Alcohol: None  Benzodiazepines: None  Opiates: None  Cocaine: None  Marijuana: None  Other Use: None   Past diagnosis:  Depression, Personality Disorder, Suicide attempt(s), PTSD  Family history:  No known history of mental health or  chemical health concerns  Past treatment:  Individual therapy, Case management, Psychiatric Medication Management, Inpatient Hospitalization  Details of most recent treatment:  pt currently has a therapist, psychiatrist, and   Other relevant history:  pt has a hx of inpatient mental health hospitalizations       Collateral Information  Is there collateral information: Yes     Collateral information name, relationship, phone number:  [facility] [phone]. Staff Teresa present    What happened today: Pt asked to be brought to the ED     What is different about patient's functioning: High anxiety.   Not attending to ADL's.  Making lots of comments about not wanting to be around.  Wanting to die. Wanting to end her life.     Concern about alcohol/drug use:  No     What do you think the patient needs:  inpatient     Has patient made comments about wanting to kill themselves/others: yes    If d/c is recommended, can they take part in safety/aftercare planning:       Additional collateral information:        Risk Assessment  B[place]berg Suicide Severity Rating Scale Full Clinical Version:  Suicidal Ideation  Q6 Suicide Behavior (Lifetime): yes          [place]berg Suicide Severity Rating Scale Recent:   Suicidal Ideation (Recent)  Q1 Wished to be Dead (Past Month): yes  Q2 Suicidal Thoughts (Past Month): yes  Q3 Suicidal Thought Method: yes  Q4 Suicidal Intent without Specific Plan: yes  Q5 Suicide Intent with Specific Plan: yes  Within the Past 3 Months?: yes  Level of Risk per Screen: high risk  Intensity of Ideation (Recent)  Most Severe Ideation Rating (Past 1 Month): 5  Frequency (Past 1 Month): Many times each day  Duration (Past 1 Month): More than 8 hours/persistent or continuous  Controllability (Past 1 Month): Can control thoughts with a lot of difficulty  Deterrents (Past 1 Month): Uncertain that deterrents stopped you  Reasons for Ideation (Past 1 Month): Completely to end or stop  the pain (You couldn't go on living with the pain or how you were feeling)  Suicidal Behavior (Recent)  Actual Attempt (Past 3 Months): No  Total Number of Actual Attempts (Past 3 Months): 0  Has subject engaged in non-suicidal self-injurious behavior? (Past 3 Months): No  Interrupted Attempts (Past 3 Months): No  Total Number of Interrupted Attempts (Past 3 Months): 0  Aborted or Self-Interrupted Attempt (Past 3 Months): No  Total Number of Aborted or Self-Interrupted Attempts (Past 3 Months): 0  Preparatory Acts or Behavior (Past 3 Months): No  Total Number of Preparatory Acts (Past 3 Months): 0    Environmental or Psychosocial Events: helplessness/hopelessness, neither working nor attending school, social isolation, recent life events (see comment)  Protective Factors: Protective Factors: lives in a responsibly safe and stable environment, good treatment engagement, able to access care without barriers, supportive ongoing medical and mental health care relationships, help seeking, reality testing ability    Does the patient have thoughts of harming others? Feels Like Hurting Others: no  Previous Attempt to Hurt Others: no  Current presentation:  (calm and cooperative)  Is the patient engaging in sexually inappropriate behavior?: no    Is the patient engaging in sexually inappropriate behavior?  no        Mental Status Exam   Affect: Constricted  Appearance: Appropriate  Attention Span/Concentration: Attentive  Eye Contact: Variable    Fund of Knowledge: Appropriate   Language /Speech Content: Fluent  Language /Speech Volume: Normal  Language /Speech Rate/Productions: Normal  Recent Memory: Intact  Remote Memory: Intact  Mood: Anxious, Depressed, Sad  Orientation to Person: Yes   Orientation to Place: Yes  Orientation to Time of Day: Yes  Orientation to Date: Yes     Situation (Do they understand why they are here?): Yes  Psychomotor Behavior: Normal  Thought Content: Suicidal  Thought Form: Goal Directed,  Obsessive/Perseverative            Medication  Psychotropic medications:   Medication Orders - Psychiatric (From admission, onward)      None             Current Care Team  Patient Care Team:  No Ref-Primary, Physician as PCP - General Heath Vasquez as Therapist  Maribell sEparza as   Thiago - Manager as   Krzysztof Walls APRN CNP as Nurse Practitioner (Psychiatry)    Diagnosis  Patient Active Problem List   Diagnosis    Prenatal care in third trimester    PTSD (post-traumatic stress disorder)    Housing lack    Urinary tract infection in mother during third trimester of pregnancy    Encounter for triage in pregnant patient    Disassociation    Major depressive disorder, recurrent episode, moderate (H)    Borderline personality disorder (H)       Primary Problem This Admission  Active Hospital Problems    Borderline personality disorder (H)      PTSD (post-traumatic stress disorder)      Major depressive disorder, recurrent episode, moderate (H)    F60.3 BPD  F33.1 MDD  F43.1 PTSD     Clinical Summary and Substantiation of Recommendations   Pt presents for the 2nd time in a month for evaluation of increased anxiety and suicidal ideation. Her coping skill are not working despite her efforts to       Imminent risk of harm: Suicidal Behavior  Severe psychiatric, behavioral or other comorbid conditions are appropriate for management at inpatient mental health as indicated by at least one of the following: Psychiatric Symptoms  Severe dysfunction in daily living is present as indicated by at least one of the following: Incapacitation because of grave disability  Situation and expectations are appropriate for inpatient care: Voluntary treatment at lower level of care is not feasible  Inpatient mental health services are necessary to meet patient needs and at least one of the following: Specific condition related to admission diagnosis is present and judged likely to further improve at proposed  level of care      Patient coping skills attempted to reduce the crisis:  talking with group home staff    Disposition  Recommended disposition: Group Home, Individual Therapy, Medication Management, Programmatic Care        Reviewed case and recommendations with attending provider. Attending Name: Dr Barraza       Attending concurs with disposition: yes       Patient and/or validated legal guardian concurs with disposition:   yes       Final disposition:  inpatient mental health.  Clinical information given to Hiral at Intake 4/17@[address] [place] [address] for placement.     Legal status on admission: Voluntary/Patient has signed consent for treatment    Assessment Details   Total duration spent with the patient: 30 min     CPT code(s) utilized: 95296 - Psychotherapy for Crisis - 60 (30-74*) min    In[facility], Psychotherapist  DEC - Triage & Transition Services  Callback: [phone]

## 2024-04-17 NOTE — MEDICATION SCRIBE - ADMISSION MEDICATION HISTORY
Medication Scribe Admission Medication History    Admission medication history is complete. The information provided in this note is only as accurate as the sources available at the time of the update.    Information Source(s): Facility (TCU/NH/) medication list/MAR and CareEverywhere/SureScripts via in-person    Pertinent Information: Spoke with , state that pt was on hydroxyzine but was discontinued for propanolol but pt is not taking it anymore due to side effects. Has not found a new antianxiety med.     Changes made to PTA medication list:  Added:   Trazodone   Pristiq   Deleted:   Hydroxyzine   Changed:   Risperidone 0.5 tab daily to 1 mg BID   Melatonin 3 mg to 5 mg   Gabapentin 300 mg bedtime to 600 mg bedtime     Allergies reviewed with patient and updates made in EHR: yes    Medication History Completed By: Francie Brunner 4/17/2024 3:51 PM    PTA Med List   Medication Sig Note Last Dose    atorvastatin (LIPITOR) 20 MG tablet Take 1 tablet (20 mg) by mouth at bedtime  4/16/2024 at pm    desvenlafaxine (PRISTIQ) 50 MG 24 hr tablet Take 50 mg by mouth daily 4/17/2024: Hasn't started yet     DULoxetine (CYMBALTA) 60 MG capsule Take 1 capsule (60 mg) by mouth daily  4/17/2024 at am    gabapentin (NEURONTIN) 300 MG capsule Take 1 capsule (300 mg) by mouth at bedtime (Patient taking differently: Take 600 mg by mouth at bedtime)  4/16/2024 at pm    melatonin 3 MG tablet Take 1 tablet (3 mg) by mouth nightly as needed for sleep (Patient taking differently: Take 3 mg by mouth at bedtime)  4/16/2024 at pm    metFORMIN (GLUCOPHAGE XR) 500 MG 24 hr tablet Take 1 tablet (500 mg) by mouth daily  4/16/2024 at pm    risperiDONE (RISPERDAL M-TABS) 0.5 MG ODT Take 1 tablet (0.5 mg) by mouth daily (Patient taking differently: Take 1 mg by mouth 2 times daily)  4/17/2024 at am    traZODone (DESYREL) 50 MG tablet Take  mg by mouth nightly as needed  4/16/2024 at pm

## 2024-04-17 NOTE — ED PROVIDER NOTES
ED Provider Note  [facility]      History     Chief Complaint   Patient presents with    Suicidal     HPI  Sveta Cuellar is a 38 year old female with hx of TIGIST, MDD, PTSD, BPD who presents to the ED with suicidal thoughts.  She has thoughts to jump off of a bridge, cut her wrist, overdose, or hand herself and feels she cannot be sure if she can keep herself safe.  She has been leaving at a group home and came with group home staff. She has been at the group home since March 6th.  She says he anxiety has been very high and she has been feeling suicidal for the past couple of weeks. She says her typical coping skills of art, puzzles, crafts have not been helping her. She tried a day treatment program but couldn't concentrate and quit after 3 sessions.  Yesterday she held a scissors to her wrist.  She turned in her scissor and razor to the group home staff.  She was worried she would use them to cut herself. She is feeling unsafe and feels she needs to be at the hospital.  She was last admitted this past January.  She started pristiq about 1 month ago and it doesn't seem to be helping. She did gene testing before they started the pristiq.  She was on atarax but that wasn't helping so they switched it to propranolol which gave her headaches, so now she is not on prn meds for anxiety.  She has been in an IRTS in the apst.       Physical Exam   BP: (!) 169/80  Pulse: 101  Temp: 98.7  F (37.1  C)  Resp: 18  Weight: 104.3 kg (230 lb)  SpO2: 95 %  Physical Exam  Vitals and nursing note reviewed.   HENT:      Head: Normocephalic and atraumatic.      Nose: Nose normal.   Eyes:      Extraocular Movements: Extraocular movements intact.   Cardiovascular:      Rate and Rhythm: Normal rate.   Pulmonary:      Effort: Pulmonary effort is normal.   Musculoskeletal:         General: Normal range of motion.      Cervical back: Normal range of motion.   Skin:     General: Skin is warm and dry.    Neurological:      General: No focal deficit present.      Mental Status: She is alert and oriented to person, place, and time.   Psychiatric:         Attention and Perception: Attention and perception normal.         Mood and Affect: Mood is depressed.         Speech: Speech normal.         Behavior: Behavior normal. Behavior is cooperative.         Thought Content: Thought content includes suicidal ideation. Thought content includes suicidal plan.         Cognition and Memory: Cognition and memory normal.         Judgment: Judgment is impulsive.           ED Course, Procedures, & Data      Procedures            Results for orders placed or performed during the hospital encounter of 04/17/24   Comprehensive metabolic panel     Status: Normal   Result Value Ref Range    Sodium 140 135 - 145 mmol/L    Potassium 4.0 3.4 - 5.3 mmol/L    Carbon Dioxide (CO2) 25 22 - 29 mmol/L    Anion Gap 12 7 - 15 mmol/L    Urea Nitrogen 10.4 6.0 - 20.0 mg/dL    Creatinine 0.61 0.51 - 0.95 mg/dL    GFR Estimate >90 >60 mL/min/1.73m2    Calcium 9.8 8.6 - 10.0 mg/dL    Chloride 103 98 - 107 mmol/L    Glucose 86 70 - 99 mg/dL    Alkaline Phosphatase 101 40 - 150 U/L    AST 31 0 - 45 U/L    ALT 45 0 - 50 U/L    Protein Total 7.5 6.4 - 8.3 g/dL    Albumin 4.5 3.5 - 5.2 g/dL    Bilirubin Total 0.3 <=1.2 mg/dL   HCG qualitative urine (UPT)     Status: Normal   Result Value Ref Range    hCG Urine Qualitative Negative Negative   Magnesium     Status: Normal   Result Value Ref Range    Magnesium 2.1 1.7 - 2.3 mg/dL   TSH with free T4 reflex     Status: Normal   Result Value Ref Range    TSH 0.71 0.30 - 4.20 uIU/mL   CBC with platelets and differential     Status: Abnormal   Result Value Ref Range    WBC Count 9.6 4.0 - 11.0 10e3/uL    RBC Count 5.37 (H) 3.80 - 5.20 10e6/uL    Hemoglobin 13.6 11.7 - 15.7 g/dL    Hematocrit 42.9 35.0 - 47.0 %    MCV 80 78 - 100 fL    MCH 25.3 (L) 26.5 - 33.0 pg    MCHC 31.7 31.5 - 36.5 g/dL    RDW 14.8 10.0 -  15.0 %    Platelet Count 251 150 - 450 10e3/uL    % Neutrophils 54 %    % Lymphocytes 38 %    % Monocytes 6 %    % Eosinophils 2 %    % Basophils 0 %    % Immature Granulocytes 0 %    NRBCs per 100 WBC 0 <1 /100    Absolute Neutrophils 5.1 1.6 - 8.3 10e3/uL    Absolute Lymphocytes 3.7 0.8 - 5.3 10e3/uL    Absolute Monocytes 0.6 0.0 - 1.3 10e3/uL    Absolute Eosinophils 0.1 0.0 - 0.7 10e3/uL    Absolute Basophils 0.0 0.0 - 0.2 10e3/uL    Absolute Immature Granulocytes 0.0 <=0.4 10e3/uL    Absolute NRBCs 0.0 10e3/uL   Urine Drug Screen Panel     Status: Normal   Result Value Ref Range    Amphetamines Urine Screen Negative Screen Negative    Barbituates Urine Screen Negative Screen Negative    Benzodiazepine Urine Screen Negative Screen Negative    Cannabinoids Urine Screen Negative Screen Negative    Cocaine Urine Screen Negative Screen Negative    Fentanyl Qual Urine Screen Negative Screen Negative    Opiates Urine Screen Negative Screen Negative    PCP Urine Screen Negative Screen Negative   CBC with platelets differential     Status: Abnormal    Narrative    The following orders were created for panel order CBC with platelets differential.  Procedure                               Abnormality         Status                     ---------                               -----------         ------                     CBC with platelets and d...[[id number]]  Abnormal            Final result                 Please view results for these tests on the individual orders.   Urine Drug Screen     Status: Normal    Narrative    The following orders were created for panel order Urine Drug Screen.  Procedure                               Abnormality         Status                     ---------                               -----------         ------                     Urine Drug Screen Panel[[id number]]      Normal              Final result                 Please view results for these tests on the individual orders.   CBC with  platelets differential *Canceled*     Status: None ()    Narrative    The following orders were created for panel order CBC with platelets differential.  Procedure                               Abnormality         Status                     ---------                               -----------         ------                     CBC with platelets and d...[[id number]]                                                   Please view results for these tests on the individual orders.     Medications   hydrOXYzine HCl (ATARAX) tablet 50 mg (50 mg Oral $Given 4/17/24 1754)   metFORMIN (GLUCOPHAGE XR) 24 hr tablet 500 mg (500 mg Oral $Given 4/17/24 1754)   risperiDONE (risperDAL) tablet 1 mg (has no administration in time range)   gabapentin (NEURONTIN) capsule 600 mg (has no administration in time range)   desvenlafaxine (PRISTIQ) 24 hr tablet 50 mg (50 mg Oral $Given 4/17/24 1754)   traZODone (DESYREL) tablet  mg (has no administration in time range)   gabapentin (NEURONTIN) capsule 300 mg (has no administration in time range)     Labs Ordered and Resulted from Time of ED Arrival to Time of ED Departure   CBC WITH PLATELETS AND DIFFERENTIAL - Abnormal       Result Value    WBC Count 9.6      RBC Count 5.37 (*)     Hemoglobin 13.6      Hematocrit 42.9      MCV 80      MCH 25.3 (*)     MCHC 31.7      RDW 14.8      Platelet Count 251      % Neutrophils 54      % Lymphocytes 38      % Monocytes 6      % Eosinophils 2      % Basophils 0      % Immature Granulocytes 0      NRBCs per 100 WBC 0      Absolute Neutrophils 5.1      Absolute Lymphocytes 3.7      Absolute Monocytes 0.6      Absolute Eosinophils 0.1      Absolute Basophils 0.0      Absolute Immature Granulocytes 0.0      Absolute NRBCs 0.0     COMPREHENSIVE METABOLIC PANEL - Normal    Sodium 140      Potassium 4.0      Carbon Dioxide (CO2) 25      Anion Gap 12      Urea Nitrogen 10.4      Creatinine 0.61      GFR Estimate >90      Calcium 9.8      Chloride 103       Glucose 86      Alkaline Phosphatase 101      AST 31      ALT 45      Protein Total 7.5      Albumin 4.5      Bilirubin Total 0.3     HCG QUALITATIVE URINE - Normal    hCG Urine Qualitative Negative     MAGNESIUM - Normal    Magnesium 2.1     TSH WITH FREE T4 REFLEX - Normal    TSH 0.71     URINE DRUG SCREEN PANEL - Normal    Amphetamines Urine Screen Negative      Barbituates Urine Screen Negative      Benzodiazepine Urine Screen Negative      Cannabinoids Urine Screen Negative      Cocaine Urine Screen Negative      Fentanyl Qual Urine Screen Negative      Opiates Urine Screen Negative      PCP Urine Screen Negative       No orders to display          Critical care was not performed.     Medical Decision Making  The patient's presentation was of high complexity (a chronic illness severe exacerbation, progression, or side effect of treatment).    The patient's evaluation involved:note from 1/17/24  review of external note(s) from 1 sources (see separate area of note for details)  ordering and/or review of 3+ test(s) in this encounter (see separate area of note for details)  discussion of management or test interpretation with another health professional (dec )    The patient's management necessitated high risk (a decision regarding hospitalization).    Assessment & Plan    Sveta Cuellar is a 38 year old female with hx of TIGIST, MDD, PTSD, BPD who presents to the ED with suicidal thoughts.  Dec assessment done and admission recommended due to continued si with plan.  Psychiatry consult service saw the patient in the ED as well and agrees with admission.  They had medication recommendations which were ordered.  This is a voluntary admit. She will need a reassessment if she wanted to leave.     I have reviewed the nursing notes. I have reviewed the findings, diagnosis, plan and need for follow up with the patient.    New Prescriptions    No medications on file       Final diagnoses:   Episode of recurrent  major depressive disorder, unspecified depression episode severity (H24)   TIGIST (generalized anxiety disorder)   PTSD (post-traumatic stress disorder)       Miryam Barraza MD  [facility] EMERGENCY DEPARTMENT  4/17/2024     Miryam Barraza MD  04/17/24 2050

## 2024-04-17 NOTE — PLAN OF CARE
Sveta MADRIGAL [place]  April 17, 2024  Plan of Care Hand-off Note     Patient Care Path: inpatient mental health    Plan for Care:   Pt presents for the 2nd time in a month for evaluation of increased anxiety and suicidal ideation. Her coping skill are not working despite her efforts to    Identified Goals and Safety Issues: stabilize.    Overview:  [facility]  [phone]      Legal Status: Legal Status at Admission: Voluntary/Patient has signed consent for treatment    Psychiatry Consult: ordered        Updated RN and MD  regarding plan of care.         Inessa Chowdhury, GREGORYSW

## 2024-04-17 NOTE — ED TRIAGE NOTES
Pt here with staff from  for SI. Per staff SI with plan with anxiety. Per pt states sx on going for a week or more.  Pt had just moved into her new  a little over a month ago and this is the 3rd hospital visit since moving in. Pt states she just has flash of visions of a knuse around her neck or jumping off a bridge, slashing her wrists. Pt did turn in to staff scissors and blade that she admits she held to her wrist. Admits to previous SI attempts, 6 last being October or November of 2023. Denies drug or ETOH use /.

## 2024-04-17 NOTE — ED TRIAGE NOTES
Triage Assessment (Adult)       Row Name 04/17/24 1343          Triage Assessment    Airway WDL WDL        Respiratory WDL    Respiratory WDL WDL        Skin Circulation/Temperature WDL    Skin Circulation/Temperature WDL WDL        Cardiac WDL    Cardiac Rhythm ST        Cognitive/Neuro/Behavioral WDL    Cognitive/Neuro/Behavioral WDL X;mood/behavior     Level of Consciousness alert     Mood/Behavior sad;flat affect;withdrawn

## 2024-04-17 NOTE — CONSULTS
Sveta J Amarillo MRN# [id number]   Age: 38 year old YOB: 1985   Date of Admission to ED: 4/17/2024    In person visit Details:     Patient was assessed and interviewed face-to-face in person with this writer williams. Patient was observed to be able to participate in the assessment as evidenced by verbal consent. Assessment methods included conducting a formal interview with patient, review of medical records, collaboration with medical staff, and obtaining relevant collateral information from family and community providers when available.        Reason for Consult:   This note is being entered to supplement the psychiatry consultation note that was completed on April 17, 2024 by the licensed mental health professional Inessa Chowdhury, [facility]  have reviewed the pertinent clinical details related to their encounter. I am being consulted to offer additional guidance on psychiatric pharmacological interventions    Writer met patient in consult area face-to-face by herself patient was very pleasant and cooperative during assessment and interview.  Sveta is very alert and oriented x 4.    Patient endorses severe depressive symptoms, including sleep alteration, loss of interest in pleasurable activities, feelings of guilt/worthlessness/hopelessness, problems with energy, problems with concentration, appetite disturbance.  Suicidal ideation with a plan such as making noose , jumping off bridge and cutting her wrist.  Patient told me due to severe complicated PTSD, does not want to live anymore.  Also patient told me her anxiety getting out of control.  I will start her Pristiq 50 mg daily patient told me she did gene testing which shows Pristiq was very effective for her.  Also patient told me she is currently taking risperidone 1 mg twice daily which I restarted it.  Due to severe and anxiety I will restart her gabapentin 600 mg at bedtime and 300 and milligrams gabapentin 3 times daily as needed if  hydroxyzine is not effective.  Multiple hospitalization and multiple stay all IRTS facility currently she is from group home.  Patient told me multiple medications trial for severe depression such as  Duloxetine  Wellbutrin  Trazodone  Melatonin  Celexa  Effexor  Prozac  Zoloft  Doxepin  Lithium up to 1200 mg  Abilify  Quetiapine  Also patient tried ECT in 2020  Patient told me her nephew completed suicide recently adding up to her suicidal ideation wanting to end her life, also patient told me she gave her infant son for adoption 4 years ago due to unable to care for him also adding up depression.    Also patient told me she has been taking risperidone for auditory hallucination currently denied any auditory hallucination or visual hallucination.    Patient endorses severe symptoms of generalized anxiety, including excessive worrying throughout the day, associated with, restlessness, easy fatigability, concentration difficulty, irritability, muscle tension and disrupted sleep.  If hydroxyzine is not effective patient can ask for gabapentin.    Patient endorses symptoms of panic attacks, ie sudden-onset periods of intense discomfort, accompanied by, palpitations/tachycardia, diaphoresis, tremulousness, shortness of breath, chest pain/discomfort, nausea/abdominal pain, chills, hot flashes, paresthesias, depersonalization, derealization, fear of losing control, or fear of dying    There is genetic loading for none known.  Medical history does not appear to be significant.  Substance use does not appear to be playing a contributing role in the patient's presentation.  Patient appears to cope with stress/frustration/emotion by withdrawing.  Stressors include chronic mental health issues,  peer issues, and family dynamics.      I have reviewed the nursing notes. I have reviewed the findings, diagnosis, plan and need for follow up with the patient.         HPI:     Per ED provider Dr. Barraza's note Sveta Cuellar is a  38 year old female with hx of TIGIST, MDD, PTSD, BPD who presents to the ED with suicidal thoughts.  She has thoughts to jump off of a bridge, cut her wrist, overdose, or hand herself and feels she cannot be sure if she can keep herself safe.  She has been leaving at a group home and came with group home staff. She has been at the group home since March 6th.  She says he anxiety has been very high and she has been feeling suicidal for the past couple of weeks. She says her typical coping skills of art, puzzles, crafts have not been helping her. She tried a day treatment program but couldn't concentrate and quit after 3 sessions.  Yesterday she held a scissors to her wrist.  She turned in her scissor and razor to the group home staff.  She was worried she would use them to cut herself. She is feeling unsafe and feels she needs to be at the hospital.  She was last admitted this past January.  She started pristiq about 1 month ago and it doesn't seem to be helping. She did gene testing before they started the pristiq.  She was on atarax but that wasn't helping so they switched it to propranolol which gave her headaches, so now she is not on prn meds for anxiety.  She has been in an IRTS in the apst.     Pt has not required locked seclusion or restraints in the past 24 hours to maintain safety, please refer to RN documentation for further details.  Substance use does not appear to be playing a contributing role in the patient's presentation.  Brief Therapeutic Intervention(s):   Provided active listening, unconditional positive regard, and validation. Engaged in cognitive restructuring/ reframing, looked at common cognitive distortions and challenged negative thoughts. Engaged in guided discovery, explored patient's perspectives and helped expand them through socratic dialogue. Provided positive reinforcement for progress towards goals, gains in knowledge, and application of skills previously taught.  Engaged in social  skills training. Explored and identified early warning signs to anger        Past Psychiatric History:     Multiple  inpatient hospitalization        Substance Use and History:     None         Past Medical History:   PAST MEDICAL HISTORY:   Past Medical History:   Diagnosis Date    Depression        PAST SURGICAL HISTORY:   Past Surgical History:   Procedure Laterality Date    GI SURGERY                 Allergies:     Allergies   Allergen Reactions    Wellbutrin [Bupropion]      Nausea and dizziness             Medications:   I have reviewed this patient's current medications  Current Facility-Administered Medications   Medication Dose Route Frequency Provider Last Rate Last Admin    desvenlafaxine (PRISTIQ) 24 hr tablet 50 mg  50 mg Oral Daily Mariella Oneil APRN CNP   50 mg at 04/17/24 1754    gabapentin (NEURONTIN) capsule 300 mg  300 mg Oral Q8H PRN Mariella Oneil APRN CNP        gabapentin (NEURONTIN) capsule 600 mg  600 mg Oral At Bedtime Mariella Oneil APRN CNP        hydrOXYzine HCl (ATARAX) tablet 50 mg  50 mg Oral Q6H PRN Miryam Barraza MD   50 mg at 04/17/24 1754    metFORMIN (GLUCOPHAGE XR) 24 hr tablet 500 mg  500 mg Oral Daily with supper Miryam Barraza MD   500 mg at 04/17/24 1754    risperiDONE (risperDAL) tablet 1 mg  1 mg Oral BID Mariella Oneil APRN CNP        traZODone (DESYREL) tablet  mg   mg Oral At Bedtime PRN Mariella Oneil APRN CNP         Current Outpatient Medications   Medication Sig Dispense Refill    atorvastatin (LIPITOR) 20 MG tablet Take 1 tablet (20 mg) by mouth at bedtime 30 tablet 0    desvenlafaxine (PRISTIQ) 50 MG 24 hr tablet Take 50 mg by mouth daily      DULoxetine (CYMBALTA) 60 MG capsule Take 1 capsule (60 mg) by mouth daily 30 capsule 1    gabapentin (NEURONTIN) 300 MG capsule Take 1 capsule (300 mg) by mouth at bedtime (Patient taking differently: Take 600 mg by mouth at bedtime) 30 capsule 1    melatonin 3 MG tablet Take 1 tablet (3 mg) by mouth  nightly as needed for sleep (Patient taking differently: Take 3 mg by mouth at bedtime) 30 tablet 1    metFORMIN (GLUCOPHAGE XR) 500 MG 24 hr tablet Take 1 tablet (500 mg) by mouth daily 30 tablet 0    risperiDONE (RISPERDAL M-TABS) 0.5 MG ODT Take 1 tablet (0.5 mg) by mouth daily (Patient taking differently: Take 1 mg by mouth 2 times daily) 30 tablet 1    traZODone (DESYREL) 50 MG tablet Take  mg by mouth nightly as needed                Family History:   FAMILY HISTORY: No family history on file.           Social History:   Patient lives in a home, she came to ED due to suicidal ideation with a plan history of suicidal attempt.       - Collateral information from the famly/friend: none         PTA Medications:   (Not in a hospital admission)         Allergies:     Allergies   Allergen Reactions    Wellbutrin [Bupropion]      Nausea and dizziness          Labs:   No results found for this or any previous visit (from the past 48 hour(s)).       Physical and Psychiatric Examination:     BP (!) 169/80   Pulse 101   Temp 98.7  F (37.1  C) (Oral)   Resp 18   Wt 104.3 kg (230 lb)   SpO2 95%   BMI 44.92 kg/m    Weight is 230 lbs 0 oz  Body mass index is 44.92 kg/m .    Mental Status Exam:  Appearance: awake, alert  Attitude:  cooperative  Eye Contact:  good  Mood:  anxious, sad , and depressed  Affect:  appropriate and in normal range  Speech:  clear, coherent  Language: fluent and intact in English  Psychomotor, Gait, Musculoskeletal:  no evidence of tardive dyskinesia, dystonia, or tics  Thought Process:  logical, linear, and goal oriented  Associations:  no loose associations  Thought Content:  active suicidal ideation present, passive suicidal ideation present, plan for suicide present, no auditory hallucinations present, and no visual hallucinations present  Insight:  limited  Judgement:  limited  Oriented to:  time, person, and place  Attention Span and Concentration:  intact  Recent and Remote Memory:   intact  Fund of Knowledge:  low-normal         Diagnoses:      Episode of recurrent major depressive disorder, unspecified depression episode severity (H24)  TIGIST (generalized anxiety disorder)  PTSD (post-traumatic stress disorder)  Borderline personality disorder (H)            Recommendations:     1.Pt displays the following risk factors that support IP admission: SI, with a plan, unable to contract for safety. Pt is unable to engage in safety planning to mitigate risk level in a non-secure setting. Lower levels of care have not been successful in mitigating risk. Due to this IP is the least restrictive option of care for pt. Pt should remain in IP until deemed safe to return to the community and engage in OP MH supports    - Continue to recommend inpatient psychiatric hospitalizations for further stabilization   Patient is voluntary for inpatient mental health unit if she asked to leave the hospital AGAINST MEDICAL ADVICE please reassess    2.  Restart her medication Pristiq 50 mg daily risperidone 1 mg twice daily, gabapentin 600 mg at bedtime, gabapentin 300 mg 3 times daily as needed for anxiety if hydroxyzine is not effective,  3.  Consult psychiatry as needed  4.   Refer to psychiatric provider for medication management. **   treatment per ED team    - Consulted with [facility]  licensed mental health professional, ED physician  asked if they would like this writer to enter orders in the EHR,  patient's ED RN regarding this case.    Please call [facility]/DEC at [phone] if you have follow-up questions or wish to place another consult.  Mariella Oneil, Psychiatric Nurse practitioner    Attestation:  Time with:  Patient: 30 minutes  Treatment Team: 20 Minutes  Chart Review: 40 minutes    Total time spent was 90 minutes. Over 50% of times was spent counseling and coordination of care.    I thank  primary ED provider and extended care team very much for letting me participate in the care of this patient.    I,  Mariella Oneil, CNP, APRN, Psychiatric Nurse Practitioner have personally performed an examination of this patient.  I have edited the note to reflect all relevant changes.  I have discussed this patient with the care team April 17, 2024.  I have reviewed all vitals and laboratory findings.    Disclaimer: This note consists of symbols derived from keyboarding,

## 2024-04-17 NOTE — TELEPHONE ENCOUNTER
S: [facility] Sukumar , DEC  Yarelis   calling at 3:29 PM   about a 38 year old/Female presenting with SI with Plan      B: Pt arrived via Group Home Staff . Presenting problem, stressors: Pt reports being suicidal and anxious. Normal coping skills are not working. Pt reports feeling unsafe and will act on her SI. Plans are to Hang herself, cutting wrist, overdose or jumping over a bridge     Pt affect in ED: Constricted  Pt Dx: Major Depressive Disorder, Generalized Anxiety Disorder, PTSD, and Borderline Personality Disorder  Previous IPMH hx? Yes: 1/2024 [facility]   Pt endorses SI with a plan to Several plans     Hx of suicide attempt? Yes: Several to overdose   Pt denies SIB  Pt denies HI   Pt denies hallucinations .   Pt RARS Score: 3    Hx of aggression/violence, sexual offenses, legal concerns, Epic care plan? describe: NO   Current concerns for aggression this visit? No  Does pt have a history of Civil Commitment? No  Is Pt their own guardian? Yes    Pt is prescribed medication. Is patient medication compliant? Yes  Pt endorses OP services: Psychiatrist, , and DBT  CD concerns: None  Acute or chronic medical concerns: NO   Does Pt present with specific needs, assistive devices, or exclusionary criteria? None      Pt is ambulatory  Pt is able to perform ADLs independently      A: Pt to be reviewed for [facility] admission. Pt is Voluntary  Preferred placement: [facility]    COVID Symptoms: No  If yes, COVID test required   Utox: Not ordered, intake to request lab    CMP: Not ordered, intake requested lab  CBC: Not ordered, intake requested lab  HCG: Not ordered, intake to request lab     R: Patient cleared and ready for behavioral bed placement: Yes  Pt placed on IP worklist? Yes    Does Patient need a Transfer Center request created? No, Pt is located within [facility] ED, [facility] ED, or [place] ED       8:23 PM Paged Resident to review for unit 22 /  F bed   9:03 PM Bill called intake asking if pt is ok  with having a roommate and if she can contract for safety   9:13 PM Intake called Bec - no answer- WCB   10:07 PM intake called [facility] again - Spoke with Tricia pt can contract for safety and is roommate appropriate.   10:25 PM Paged Bill with updated info.   11:32 PM Passed off in shift handoff.

## 2024-04-18 ENCOUNTER — TELEPHONE (OUTPATIENT)
Dept: BEHAVIORAL HEALTH | Facility: CLINIC | Age: 39
End: 2024-04-18
Payer: COMMERCIAL

## 2024-04-18 PROBLEM — F33.9 EPISODE OF RECURRENT MAJOR DEPRESSIVE DISORDER, UNSPECIFIED DEPRESSION EPISODE SEVERITY (H): Status: ACTIVE | Noted: 2024-04-18

## 2024-04-18 PROBLEM — F41.1 GAD (GENERALIZED ANXIETY DISORDER): Status: ACTIVE | Noted: 2024-04-18

## 2024-04-18 PROCEDURE — 124N000002 HC R&B MH UMMC

## 2024-04-18 PROCEDURE — 99223 1ST HOSP IP/OBS HIGH 75: CPT | Mod: AI | Performed by: PSYCHIATRY & NEUROLOGY

## 2024-04-18 PROCEDURE — 250N000013 HC RX MED GY IP 250 OP 250 PS 637

## 2024-04-18 PROCEDURE — 93010 ELECTROCARDIOGRAM REPORT: CPT | Performed by: INTERNAL MEDICINE

## 2024-04-18 PROCEDURE — 250N000013 HC RX MED GY IP 250 OP 250 PS 637: Performed by: EMERGENCY MEDICINE

## 2024-04-18 PROCEDURE — 250N000013 HC RX MED GY IP 250 OP 250 PS 637: Performed by: STUDENT IN AN ORGANIZED HEALTH CARE EDUCATION/TRAINING PROGRAM

## 2024-04-18 PROCEDURE — G0177 OPPS/PHP; TRAIN & EDUC SERV: HCPCS

## 2024-04-18 RX ORDER — DULOXETIN HYDROCHLORIDE 30 MG/1
60 CAPSULE, DELAYED RELEASE ORAL DAILY
Status: DISCONTINUED | OUTPATIENT
Start: 2024-04-18 | End: 2024-04-18

## 2024-04-18 RX ORDER — HYDROXYZINE HYDROCHLORIDE 25 MG/1
25-50 TABLET, FILM COATED ORAL EVERY 6 HOURS PRN
Status: DISCONTINUED | OUTPATIENT
Start: 2024-04-18 | End: 2024-04-20

## 2024-04-18 RX ORDER — DESVENLAFAXINE 50 MG/1
100 TABLET, FILM COATED, EXTENDED RELEASE ORAL DAILY
Status: CANCELLED | OUTPATIENT
Start: 2024-04-19

## 2024-04-18 RX ORDER — HYDROXYZINE HYDROCHLORIDE 25 MG/1
25 TABLET, FILM COATED ORAL EVERY 4 HOURS PRN
Status: DISCONTINUED | OUTPATIENT
Start: 2024-04-18 | End: 2024-04-18

## 2024-04-18 RX ORDER — ACETAMINOPHEN 325 MG/1
650 TABLET ORAL EVERY 4 HOURS PRN
Status: DISCONTINUED | OUTPATIENT
Start: 2024-04-18 | End: 2024-04-22 | Stop reason: HOSPADM

## 2024-04-18 RX ORDER — IBUPROFEN 600 MG/1
600 TABLET, FILM COATED ORAL EVERY 6 HOURS PRN
Status: DISCONTINUED | OUTPATIENT
Start: 2024-04-18 | End: 2024-04-22 | Stop reason: HOSPADM

## 2024-04-18 RX ADMIN — HYDROXYZINE HYDROCHLORIDE 50 MG: 25 TABLET, FILM COATED ORAL at 16:46

## 2024-04-18 RX ADMIN — HYDROXYZINE HYDROCHLORIDE 50 MG: 50 TABLET, FILM COATED ORAL at 09:08

## 2024-04-18 RX ADMIN — METFORMIN HYDROCHLORIDE 500 MG: 500 TABLET, EXTENDED RELEASE ORAL at 18:12

## 2024-04-18 RX ADMIN — DESVENLAFAXINE 50 MG: 50 TABLET, FILM COATED, EXTENDED RELEASE ORAL at 08:36

## 2024-04-18 RX ADMIN — GABAPENTIN 600 MG: 300 CAPSULE ORAL at 20:06

## 2024-04-18 RX ADMIN — RISPERIDONE 1 MG: 1 TABLET, FILM COATED ORAL at 20:06

## 2024-04-18 RX ADMIN — Medication 5 MG: at 20:10

## 2024-04-18 RX ADMIN — TRAZODONE HYDROCHLORIDE 50 MG: 50 TABLET ORAL at 20:09

## 2024-04-18 RX ADMIN — RISPERIDONE 1 MG: 1 TABLET, FILM COATED ORAL at 08:36

## 2024-04-18 ASSESSMENT — ACTIVITIES OF DAILY LIVING (ADL)
DRESS: INDEPENDENT
LAUNDRY: WITH SUPERVISION
ADLS_ACUITY_SCORE: 35
ADLS_ACUITY_SCORE: 31
ADLS_ACUITY_SCORE: 35
ADLS_ACUITY_SCORE: 31
ADLS_ACUITY_SCORE: 31
ADLS_ACUITY_SCORE: 35
ADLS_ACUITY_SCORE: 31
ADLS_ACUITY_SCORE: 31
ADLS_ACUITY_SCORE: 35
ADLS_ACUITY_SCORE: 31
ORAL_HYGIENE: INDEPENDENT
ADLS_ACUITY_SCORE: 35
LAUNDRY: WITH SUPERVISION
ADLS_ACUITY_SCORE: 31
ADLS_ACUITY_SCORE: 35
ORAL_HYGIENE: INDEPENDENT
DRESS: INDEPENDENT
ADLS_ACUITY_SCORE: 35
ADLS_ACUITY_SCORE: 45
ADLS_ACUITY_SCORE: 35
ADLS_ACUITY_SCORE: 31
HYGIENE/GROOMING: INDEPENDENT
ADLS_ACUITY_SCORE: 35
ADLS_ACUITY_SCORE: 31
ADLS_ACUITY_SCORE: 31
HYGIENE/GROOMING: INDEPENDENT
ADLS_ACUITY_SCORE: 31
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
## 2024-04-18 NOTE — ED NOTES
2344 - Patient chart reviewed, report rec'd from outgoing RN.  No new labs or orders at this time.  Patient currently resting with eyes closed.  No signs of distress or labored breathing.  Chest rise visualized.

## 2024-04-18 NOTE — PLAN OF CARE
04/18/24 1014   Patient Belongings   Patient Belongings locker   Patient Belongings Put in Hospital Secure Location (Security or Locker, etc.) clothing;plastic bag;shoes   Belongings Search Yes   Clothing Search Yes   Second Staff AAYUSH Green     Items in the locker: 1 leggings, 1 pair of socks, 1 bra, 1 shirt, phone and 1 pair of shoes.    No wallet.     A               Admission:  I am responsible for any personal items that are not sent to the safe or pharmacy.  [facility] is not responsible for loss, theft or damage of any property in my possession.    Signature:  _________________________________ Date: _______  Time: _____                                              Staff Signature:  ____________________________ Date: ________  Time: _____      2nd Staff person, if patient is unable/unwilling to sign:    Signature: ________________________________ Date: ________  Time: _____     Discharge:  [facility] has returned all of my personal belongings:    Signature: _________________________________ Date: ________  Time: _____                                          Staff Signature:  ____________________________ Date: ________  Time: _____

## 2024-04-18 NOTE — PLAN OF CARE
Initial meeting note:    Therapist introduced self to patient and discussed psychotherapy service available to patient.     Pt response: Pt expressed interest in meeting 1:1.    Plan: Made plan to meet with Pt tomorrow.      BARBARA Farias  Psychotherapist

## 2024-04-18 NOTE — ED NOTES
Patient was calm and cooperative when she got to the unit. She was orientated to the unit. She denies feelings of wanting to hurt herself and reports that is she does she could inform staff. No thoughts of SIB, HI or hallucinations.

## 2024-04-18 NOTE — PLAN OF CARE
"SHIFT NURSING PLAN OF CARE   Problem: Anxiety Signs/Symptoms  Goal: Improved Mood Symptoms (Anxiety Signs/Symptoms)  Outcome: Progressing     Problem: Psychotic Signs/Symptoms  Goal: Improved Behavioral Control (Psychotic Signs/Symptoms)  Outcome: Progressing     Problem: Depressive Signs/Symptoms  Goal: Improved Mood Symptoms (Depressive Signs/Symptoms)  Outcome: Progressing   Goal Outcome Evaluation:    Pt has been isolative and withdrawn to her room; pt stated reason for this admission - medication adjustment and open to DBT. Pt stated that DBT alone will not help her to manage her symptoms without medication. Pt was cooperative but labile; rated anxiety 7/10 and depression 8/10; endorsed feeling sad and hopelessness. Initially pt  denied  having  thoughts of SI/SIB, but later reported she has passive suicidal thought with no plan and stated, \" I don't wanna live anymore\"; pt contracted for safety and agreed to let staff now if symptom get worse. Denied AVH and HI; offered and administered 50 mg hydroxyzine for anxiety and reported intervention was helpful in reducing her anxiety; pt denied issue with appetite and fluid intake; hygiene is adequate; VSS; medication compliant; denied medication side effect. Pt was more relaxed as shift progressed  and  watched TV in the lounge post dinner; socially isolative and kept to herself.     Pt requested and received PRN melatonin 5 mg and 50 mg trazodone for sleep.      "
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
## 2024-04-18 NOTE — ED NOTES
Patient is alert and orientedX4.Patient took her meds and ate her b/fast.Hydroxyzine 50mg PRN was given for anxiety.VSS.Patient was accepted at [facility] and report was given to Norma/AAYUSH.

## 2024-04-18 NOTE — PLAN OF CARE
Team Note Due:  Thursday    Assessment/Intervention/Current Symtoms and Care Coordination:  Chart review and met with team, discussed pt progress, symptomology, and response to treatment.  Discussed the discharge plan and any potential impediments to discharge.  Met with pt to introduce self, completed psychosocial assessment. Pt endorsed significant anxiety about having to discharge from the hospital. Voiced that she does not feel she can be safe at home. She reported that transitions are very difficult for her, and she just moved into the group home a little over a month ago. Pt identified one reason to live as her son, whom she gave up for adoption but she maintains contact with. He is 4 years old. Had pt sign ROIs for [facility] (), [facility] (OP psychiatrist and group home), and [facility]  Called patient's . Left voicemail with [facility] call back information.  Called patient's group home. They confirmed patient is able to return upon discharge, just requested advance notice.  Called [facility] to inform them that pt has been hospitalized, as she was scheduled to have her intake appointment with them today. They noted this and advised [facility] call back when pt is discharging to reschedule her intake.     Discharge Plan or Goal:  Group home with outpatient support (DBT, psychiatrist, )     Barriers to Discharge:  Symptom stabilization, med management, care coordination     Referral Status:  No referrals at this time     Legal Status:  Voluntary     Contacts:  Medication Management/Psychiatry:  Name/Clinic: [facility]  Address: [place] [address]  Phone: [phone]     Group Home:  [facility] ([facility]  [phone]      :  Name/Clinic: [facility]   Number: [phone]      Upcoming Meetings and Dates/Important Information and next steps:  Update group  home regarding pt's discharge plan  Rescheduled DBT intake with [facility]

## 2024-04-18 NOTE — PLAN OF CARE
04/18/24 1014   Patient Belongings   Patient Belongings locker   Patient Belongings Put in Hospital Secure Location (Security or Locker, etc.) clothing;plastic bag;shoes   Belongings Search Yes   Clothing Search Yes   Second Staff AAYUSH Green     Items in the locker: 1 leggings, 1 pair of socks, 1 bra, 1 shirt, phone and 1 pair of shoes.    No wallet.     A               Admission:  I am responsible for any personal items that are not sent to the safe or pharmacy.  [facility] is not responsible for loss, theft or damage of any property in my possession.    Signature:  _________________________________ Date: _______  Time: _____                                              Staff Signature:  ____________________________ Date: ________  Time: _____      2nd Staff person, if patient is unable/unwilling to sign:    Signature: ________________________________ Date: ________  Time: _____     Discharge:  [facility] has returned all of my personal belongings:    Signature: _________________________________ Date: ________  Time: _____                                          Staff Signature:  ____________________________ Date: ________  Time: _____

## 2024-04-18 NOTE — ED NOTES
0623 - Patient rested entirety of shift with no awakenings or staff interactions.  Patient currently resting with eyes closed.  No signs of distress or labored breathing.  Chest rise visualized.    Per Jacquie in intake [facility] was unable to take patient during NOC shift d/t staffing, states to call and review bed availability in AM.

## 2024-04-18 NOTE — PLAN OF CARE
Occupational Therapy Group Note     04/18/24 1559   Group Therapy Session   Group Attendance attended group session   Group Type recreation   Group Topic Covered leisure exploration/use of leisure time;cognitive activities   Group Session Detail OT: Cognitive activity (Victor M Bond) for attention, concentration, new learning, follow through, visuospatial recognition, opportunity for social engagement, leisure education/exploration, reality orientation and managing mood/symptoms.  (13:15-14:00; 3 participants)   Patient Response/Contribution cooperative with task;organized    Patient joined group for 45 minutes.  Pt presented as anxious and reserved but was willing to engage in the activity.  Pt was able to learn and follow through with the activity without difficulty.  Pt's anxiety appeared to decrease slightly in response to distraction and positive leisure engagement.

## 2024-04-18 NOTE — PLAN OF CARE
04/18/24 1046   Individualization/Patient Specific Goals   Patient Personal Strengths expressive of needs;expressive of emotions;resilient;stable living environment   Patient Vulnerabilities adverse childhood experience(s);traumatic event;limited support system   Interprofessional Rounds   Participants nursing;psychiatrist;CTC   Behavioral Team Discussion   Progress Initial assessment; pt admitted due to suicidal ideation with plan.   Anticipated length of stay TBD, pending stabilization and further assessment   Continued Stay Criteria/Rationale Symptom stabilization, medication management, care coordination   Medical/Physical Medically cleared for admission; see H&P for further detail   Precautions See below   Plan Psychiatric assessment/Medication management. Therapeutic Milieu. Individual care planning and after care planning. Patient to participate in unit groups and activities. Individual and group support on unit.   Rationale for change in precautions or plan No change   Safety Plan Per unit protocol   Anticipated Discharge Disposition group home     PRECAUTIONS AND SAFETY    Behavioral Orders   Procedures    [facility]     Until discharge, Extended Care to offer psychotherapeutic services to mental health patients boarding for admission or stabilization. These services are to include but are not limited to: individual psychotherapy, diagnostic assessment, case management and care planning, safety planning, etc. This may include up to 1 visit per day. If patient is physically located at [facility] or [facility], group psychotherapy up to 2 time per day may be offered.
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
## 2024-04-18 NOTE — PLAN OF CARE
BEH IP Unit Acuity Rating Score (UARS)  Patient is given one point for every criteria they meet.    CRITERIA SCORING   On a 72 hour hold, court hold, committed, stay of commitment, or revocation. 0    Patient LOS on BEH unit exceeds 20 days. 0  LOS: 0   Patient under guardianship, 55+, otherwise medically complex, or under age 11. 0   Suicide ideation without relief of precipitating factors. 1   Current plan for suicide. 1   Current plan for homicide. 0   Imminent risk or actual attempt to seriously harm another without relief of factors precipitating the attempt. 0   Severe dysfunction in daily living (ex: complete neglect for self care, extreme disruption in vegetative function, extreme deterioration in social interactions). 1   Recent (last 7 days) or current physical aggression in the ED or on unit. 0   Restraints or seclusion episode in past 72 hours. 0   Recent (last 7 days) or current verbal aggression, agitation, yelling, etc., while in the ED or unit. 0   Active psychosis. 0   Need for constant or near constant redirection (from leaving, from others, etc).  0   Intrusive or disruptive behaviors. 0   Patient requires 3 or more hours of individualized nursing care per 8-hour shift (i.e. for ADLs, meds, therapeutic interventions). 0   TOTAL 3

## 2024-04-18 NOTE — TELEPHONE ENCOUNTER
00:03 - Resident Martha accepts for 22. Placed pt in queue    R: 22 / O'Whaley    00:08 - Notified unit AAYUSH Goodman. Unit just received another admit, will call [facility] when able  00:10 - Notified [facility] AAYUSH Gates    00:36 - Resident Martha called reporting that unit is now short staffed overnight as a staff member was pulled for an SIO. Requesting that pt transfer on day shift with appropriate staffing.  00:38 - Notified ANS Paulina  00:40 - Called [facility] to provide update; RN unavailable. PPS will call back  00:50 - Updated [facility] AAYUSH Bonilla completed

## 2024-04-19 LAB
ATRIAL RATE - MUSE: 91 BPM
CHOLEST SERPL-MCNC: 142 MG/DL
DIASTOLIC BLOOD PRESSURE - MUSE: NORMAL MMHG
FOLATE SERPL-MCNC: 17 NG/ML (ref 4.6–34.8)
HBA1C MFR BLD: 6.6 %
HDLC SERPL-MCNC: 37 MG/DL
INTERPRETATION ECG - MUSE: NORMAL
LDLC SERPL CALC-MCNC: 70 MG/DL
NONHDLC SERPL-MCNC: 105 MG/DL
P AXIS - MUSE: 6 DEGREES
PR INTERVAL - MUSE: 138 MS
QRS DURATION - MUSE: 92 MS
QT - MUSE: 350 MS
QTC - MUSE: 430 MS
R AXIS - MUSE: 65 DEGREES
SYSTOLIC BLOOD PRESSURE - MUSE: NORMAL MMHG
T AXIS - MUSE: 3 DEGREES
TRIGL SERPL-MCNC: 176 MG/DL
TSH SERPL DL<=0.005 MIU/L-ACNC: 0.63 UIU/ML (ref 0.3–4.2)
VENTRICULAR RATE- MUSE: 91 BPM

## 2024-04-19 PROCEDURE — H2032 ACTIVITY THERAPY, PER 15 MIN: HCPCS

## 2024-04-19 PROCEDURE — 90834 PSYTX W PT 45 MINUTES: CPT

## 2024-04-19 PROCEDURE — 82607 VITAMIN B-12: CPT

## 2024-04-19 PROCEDURE — 250N000013 HC RX MED GY IP 250 OP 250 PS 637

## 2024-04-19 PROCEDURE — 84443 ASSAY THYROID STIM HORMONE: CPT

## 2024-04-19 PROCEDURE — 124N000002 HC R&B MH UMMC

## 2024-04-19 PROCEDURE — 99232 SBSQ HOSP IP/OBS MODERATE 35: CPT | Mod: GC | Performed by: PSYCHIATRY & NEUROLOGY

## 2024-04-19 PROCEDURE — 83036 HEMOGLOBIN GLYCOSYLATED A1C: CPT

## 2024-04-19 PROCEDURE — 36415 COLL VENOUS BLD VENIPUNCTURE: CPT

## 2024-04-19 PROCEDURE — 83718 ASSAY OF LIPOPROTEIN: CPT

## 2024-04-19 PROCEDURE — 82746 ASSAY OF FOLIC ACID SERUM: CPT

## 2024-04-19 PROCEDURE — 250N000013 HC RX MED GY IP 250 OP 250 PS 637: Performed by: EMERGENCY MEDICINE

## 2024-04-19 PROCEDURE — 250N000013 HC RX MED GY IP 250 OP 250 PS 637: Performed by: STUDENT IN AN ORGANIZED HEALTH CARE EDUCATION/TRAINING PROGRAM

## 2024-04-19 RX ORDER — PRAZOSIN HYDROCHLORIDE 1 MG/1
1 CAPSULE ORAL 2 TIMES DAILY
Status: DISCONTINUED | OUTPATIENT
Start: 2024-04-19 | End: 2024-04-22 | Stop reason: HOSPADM

## 2024-04-19 RX ADMIN — PRAZOSIN HYDROCHLORIDE 1 MG: 1 CAPSULE ORAL at 20:00

## 2024-04-19 RX ADMIN — RISPERIDONE 1 MG: 1 TABLET, FILM COATED ORAL at 20:00

## 2024-04-19 RX ADMIN — RISPERIDONE 1 MG: 1 TABLET, FILM COATED ORAL at 08:20

## 2024-04-19 RX ADMIN — METFORMIN HYDROCHLORIDE 500 MG: 500 TABLET, EXTENDED RELEASE ORAL at 17:40

## 2024-04-19 RX ADMIN — TRAZODONE HYDROCHLORIDE 50 MG: 50 TABLET ORAL at 20:02

## 2024-04-19 RX ADMIN — GABAPENTIN 600 MG: 300 CAPSULE ORAL at 20:00

## 2024-04-19 RX ADMIN — GABAPENTIN 300 MG: 300 CAPSULE ORAL at 22:06

## 2024-04-19 RX ADMIN — Medication 5 MG: at 20:00

## 2024-04-19 RX ADMIN — DESVENLAFAXINE 50 MG: 50 TABLET, FILM COATED, EXTENDED RELEASE ORAL at 08:20

## 2024-04-19 RX ADMIN — PRAZOSIN HYDROCHLORIDE 1 MG: 1 CAPSULE ORAL at 12:57

## 2024-04-19 RX ADMIN — HYDROXYZINE HYDROCHLORIDE 50 MG: 25 TABLET, FILM COATED ORAL at 19:44

## 2024-04-19 RX ADMIN — HYDROXYZINE HYDROCHLORIDE 50 MG: 25 TABLET, FILM COATED ORAL at 13:03

## 2024-04-19 RX ADMIN — GABAPENTIN 300 MG: 300 CAPSULE ORAL at 14:09

## 2024-04-19 ASSESSMENT — ACTIVITIES OF DAILY LIVING (ADL)
ADLS_ACUITY_SCORE: 31
ORAL_HYGIENE: INDEPENDENT
ADLS_ACUITY_SCORE: 31
ADLS_ACUITY_SCORE: 31
LAUNDRY: WITH SUPERVISION
HYGIENE/GROOMING: INDEPENDENT
ADLS_ACUITY_SCORE: 31
ADLS_ACUITY_SCORE: 31
DRESS: INDEPENDENT
ADLS_ACUITY_SCORE: 31
ADLS_ACUITY_SCORE: 31
DRESS: INDEPENDENT
ORAL_HYGIENE: INDEPENDENT
ADLS_ACUITY_SCORE: 31
HYGIENE/GROOMING: INDEPENDENT
ADLS_ACUITY_SCORE: 31

## 2024-04-19 NOTE — PLAN OF CARE
"RN Assessment    SI: endorses passive SI thoughts stating \"I don't want to be alive anymore, I can't get better\". Pt denies any plan while in hospital and contracts for safety  SIB: pt denies stating \"I won't do anything to harm myself\"  HI: denies  AVH: denies  Anxiety: pt presents with high anxiety surrounding her life in general and feeling that she should be \"further than I am\" and \"I should've overcome this trauma by now, I'm almost 40\". PRN hydroxyzine given with little effect, then PRN gabapentin given and pt took a nap.  Depression: presents with cyclical negative thought process, hopelessness, does not believe she can get better. Pt reports very low level of self confidence and fear of failure.  Sleep: 7 hours recorded, denies concerns  Affect & Mood: affect flat, mood depressed/hopeless  Behavior: Pt is compliant with medications. Pt is intermittently visible in the milieu, withdrawn to self. Cooperative with staff, though presents with attention seeking behavior and needs a lot of reassurance and emotional support. Writer encouraged pt to think and write down some positive affirmations and to attend DBT with positive attitude and give it a chance. Journal was provided to pt and she states this was somewhat helpful. Pt states she is \"scared they're gonna send me home too soon\" and states she wants to \"stay in the hospital forever\". Pt was redirected and the benefits of DBT were reiterated.     Pt started prazosin 1 mg today- first dose given at 1300.    Medication SE: none reported or observed  Hygiene: adequate   VS: BP initially elevated 138/94--asymptomatic, recheck is 131/84  Pain: denies  Medical Concerns: Hgb A1c is 6.6%- provider team aware, pt already prescribed metformin   Appetite & Fluid intake: adequate   Bowel & Bladder: no issues reported    PRN medications utilized this shift include:  Hydroxyzine 50 mg- anxiety- not effective  Gabapentin 300 mg- anxiety  "

## 2024-04-19 NOTE — PLAN OF CARE
"   04/19/24 1500   Group Therapy Session   Group Attendance attended group session   Time Session Began 1015   Time Session Ended 1115   Total Time (minutes) 30   Total # Attendees 5   Group Type expressive therapy   Group Topic Covered emotions/expression   Patient Response/Contribution cooperative with task     Goal Outcome Evaluation:    Art Therapy directive is to create a 2-D mask of strength, using lines, shapes, colors and/or imagery and words to express pts personal strength and/or vision of what strength is to pt.  Goals of directive: to create a visual self narrative, emotional expression, emotional regulation, identifying personal strengths and goals.  Pt was a quiet, participant, at times appeared distracted and disorganized. Pt did not finish mask but shared her progress with author and group thus far. Pt created a mask with realistic features and wrote on the mask \"kind hearted.\" Pt said that her strength is being kind to others.  Pts mood was calm, further assessment is needed.                              "

## 2024-04-19 NOTE — PLAN OF CARE
BEH IP Unit Acuity Rating Score (UARS)  Patient is given one point for every criteria they meet.    CRITERIA SCORING   On a 72 hour hold, court hold, committed, stay of commitment, or revocation. 0    Patient LOS on BEH unit exceeds 20 days. 0  LOS: 1   Patient under guardianship, 55+, otherwise medically complex, or under age 11. 0   Suicide ideation without relief of precipitating factors. 1   Current plan for suicide. 1   Current plan for homicide. 0   Imminent risk or actual attempt to seriously harm another without relief of factors precipitating the attempt. 0   Severe dysfunction in daily living (ex: complete neglect for self care, extreme disruption in vegetative function, extreme deterioration in social interactions). 1   Recent (last 7 days) or current physical aggression in the ED or on unit. 0   Restraints or seclusion episode in past 72 hours. 0   Recent (last 7 days) or current verbal aggression, agitation, yelling, etc., while in the ED or unit. 0   Active psychosis. 0   Need for constant or near constant redirection (from leaving, from others, etc).  0   Intrusive or disruptive behaviors. 0   Patient requires 3 or more hours of individualized nursing care per 8-hour shift (i.e. for ADLs, meds, therapeutic interventions). 0   TOTAL 3

## 2024-04-19 NOTE — PLAN OF CARE
"Individual Therapy Note      Date of Service: April 19, 2024    Patient: Sveta goes by \"Sveta,\" uses she/her pronouns    Individuals Present: Sveta BARBARA Miles    Session start: 1320  Session end: 1400  Session duration in minutes: 40    Patient Active Problem List   Diagnosis    Prenatal care in third trimester    PTSD (post-traumatic stress disorder)    Housing lack    Urinary tract infection in mother during third trimester of pregnancy    Encounter for triage in pregnant patient    Disassociation    Major depressive disorder, recurrent episode, moderate (H)    Borderline personality disorder (H)    TIGIST (generalized anxiety disorder)    Episode of recurrent major depressive disorder, unspecified depression episode severity (H24)         Modality Used:CBT, Rapport Building, and Motivational Interviewing    Goals: Develop strategies to manage depression and anxiety, potentially re-engage in DBT therapy    Patient Description of current symptoms: Scared, burnt out, anxious, sad     Mental Status Exam:   Attitude: cooperative  Eye Contact: good  Mood: anxious, sad , and depressed  Affect: intensity is blunted  Speech: clear, coherent  Psychomotor Behavior: no evidence of tardive dyskinesia, dystonia, or tics  Thought Process:  linear  Associations: no loose associations  Thought Content: no evidence of suicidal ideation or homicidal ideation  Insight: fair  Judgement: fair  Attention Span and Concentration: intact    Pt progress: Focus of session was on discussing DBT program and other strategies to manage depression/anxiety, psychoeducation for borderline personality disorder. Pt shared that she feels sad and scared that things will never change, is \"suffering alone\" and enjoys speaking with writer, anyone, as it makes her feel less alone. Pt reports she would like higher doses of medication for her depression and anxiety but providers keep recommending DBT. Writer explained that medications can " help with symptoms of borderline but DBT could actually target root of symptoms. Writer and Pt looked at information on the computer about BPD, Pt became tearful realizing she recognizes many behavioral patterns, emotions seen in people with BPD.     Engaged in discussion around re-trying DBT program. Pt acknowledges the first program was not ideal - program was all virtual and her individual therapist was not DBT certified. Pt still appears hesitant to try DBT again, explains it is hard for her to have motivation to try anything right now. Writer validated her feelings and encouraged her to consider DBT and what feels right to her.    Treatment Objective(s) Addressed:   The focus of this session was on rapport building, orienting the patient to therapy, identifying and practicing coping strategies, and identifying an appropriate aftercare plan     Progress Towards Goals and Assessment of Patient:   Unable to assess progress towards goals - first meeting with Pt.     Therapeutic Intervention(s):   Provided active listening, unconditional positive regard, and validation. Engaged in cognitive restructuring/ reframing, looked at common cognitive distortions and challenged negative thoughts. Coached on coping techniques/relaxation skills to help improve distress tolerance and managing intense emotions. Explored motivation for behavioral change.    Plan/next step: Writer will continue to check in with Pt, encouraged Pt to attend group sessions.      50005 - Psychotherapy (with patient) - 45 (38-52*) min      BARBARA Farias  Psychotherapist

## 2024-04-19 NOTE — PROGRESS NOTES
"  ----------------------------------------------------------------------------------------------------------  [facility]  Psychiatry Progress Note  Hospital Day #1     Interim History:     The patient's care was discussed with the treatment team and chart notes were reviewed.    Vitals: VSS  Sleep: 7 hours (04/19/24 0700)  Scheduled medications: Took all scheduled medications as prescribed  Psychiatric PRN medications:     Last 24H PRN:     hydrOXYzine HCl (ATARAX) tablet 25-50 mg, 50 mg at 04/18/24 1646    melatonin tablet 5 mg, 5 mg at 04/18/24 2010    traZODone (DESYREL) half-tab 25 mg **OR** traZODone (DESYREL) tablet 50 mg, 50 mg at 04/18/24 2009     Staff Report:   No acute events or safety concerns overnight. In summary,\"Pt has been isolative and withdrawn to her room; pt stated reason for this admission - medication adjustment and open to DBT. Pt stated that DBT alone will not help her to manage her symptoms without medication. Pt was cooperative but labile; rated anxiety 7/10 and depression 8/10; endorsed feeling sad and hopelessness. Initially pt  denied  having  thoughts of SI/SIB, but later reported she has passive suicidal thought with no plan and stated, \" I don't wanna live anymore\"; pt contracted for safety and agreed to let staff now if symptom get worse. Denied AVH and HI; offered and administered 50 mg hydroxyzine for anxiety and reported intervention was helpful in reducing her anxiety; pt denied issue with appetite and fluid intake; hygiene is adequate; VSS; medication compliant; denied medication side effect. Pt was more relaxed as shift progressed  and  watched TV in the lounge post dinner; socially isolative and kept to herself. \" Please see staff notes for details.      Subjective:     Patient Interview:  Sveta MADRIGAL Polo was interviewed in her room. Reports doing \"not good because I came in here for help and now I'm not sure if I can do the " "work.\" Describes cycles of doing well and doing bad which is distressing to her. \"It makes me not want to live. I can't deal with my mental illness and what I've been through.\" Endorses feeling alone when around others. Feels anxious, reports talking through her symptoms helps her the most. She's disappointed in herself for having to come to the hospital. Discussed benefits of DBT, need to continue therapy and how her distress for not finishing DBT is a good sign that she cares. She is concerned that she doesn't have enough people to talk to because there isn't enough staff. Reassured that DBT provides increased benefits. Wants to know if there is more medication that can help, discussed importance of medication as an adjunct to DBT. Endorses reliving prior experiences, vivid dreams related to past trauma.     ROS:  Patient has no bothersome physical symptoms  Patient denies acute concerns     Objective:     Vitals:  BP (!) 138/94 (BP Location: Left arm)   Pulse 102   Temp 97.4  F (36.3  C) (Temporal)   Resp 18   Ht 1.524 m (5')   Wt 104.3 kg (230 lb)   SpO2 95%   BMI 44.92 kg/m      Allergies:  Allergies   Allergen Reactions    Wellbutrin [Bupropion]      Nausea and dizziness       Current Medications:  Scheduled:  Current Facility-Administered Medications   Medication Dose Route Frequency Provider Last Rate Last Admin    acetaminophen (TYLENOL) tablet 650 mg  650 mg Oral Q4H PRN Jacob Morales MD        desvenlafaxine (PRISTIQ) 24 hr tablet 50 mg  50 mg Oral Daily Mariella Oneil APRN CNP   50 mg at 04/19/24 0820    gabapentin (NEURONTIN) capsule 300 mg  300 mg Oral Q8H PRN Mariella Oneil APRN CNP        gabapentin (NEURONTIN) capsule 600 mg  600 mg Oral At Bedtime Mariella Oneil APRN CNP   600 mg at 04/18/24 2006    hydrOXYzine HCl (ATARAX) tablet 25-50 mg  25-50 mg Oral Q6H PRN Jake Moreno DO   50 mg at 04/18/24 1646    ibuprofen (ADVIL/MOTRIN) tablet 600 mg  600 mg Oral Q6H PRN Andrew" Jacob Arevalo MD        melatonin tablet 5 mg  5 mg Oral At Bedtime PRN Miryam Barraza MD   5 mg at 04/18/24 2010    metFORMIN (GLUCOPHAGE XR) 24 hr tablet 500 mg  500 mg Oral Daily with Miryam Scott MD   500 mg at 04/18/24 1812    risperiDONE (risperDAL) tablet 1 mg  1 mg Oral BID Mariella Oneil M, APRN CNP   1 mg at 04/19/24 0820    traZODone (DESYREL) half-tab 25 mg  25 mg Oral At Bedtime PRN Miryam Barraza MD        Or    traZODone (DESYREL) tablet 50 mg  50 mg Oral At Bedtime PRN Miryam Barraza MD   50 mg at 04/18/24 2009       PRN:  Current Facility-Administered Medications   Medication Dose Route Frequency Provider Last Rate Last Admin    acetaminophen (TYLENOL) tablet 650 mg  650 mg Oral Q4H PRN Jacob Morales MD        desvenlafaxine (PRISTIQ) 24 hr tablet 50 mg  50 mg Oral Daily Oneil, Zamoni M, APRN CNP   50 mg at 04/19/24 0820    gabapentin (NEURONTIN) capsule 300 mg  300 mg Oral Q8H PRN Oneil, Rosanamoni M, APRN CNP        gabapentin (NEURONTIN) capsule 600 mg  600 mg Oral At Bedtime Oneil Rosanamoni BOWEN, APRN CNP   600 mg at 04/18/24 2006    hydrOXYzine HCl (ATARAX) tablet 25-50 mg  25-50 mg Oral Q6H PRN Jake Moreno DO   50 mg at 04/18/24 1646    ibuprofen (ADVIL/MOTRIN) tablet 600 mg  600 mg Oral Q6H PRN Jacob Morales MD        melatonin tablet 5 mg  5 mg Oral At Bedtime PRN Miryam Barraza MD   5 mg at 04/18/24 2010    metFORMIN (GLUCOPHAGE XR) 24 hr tablet 500 mg  500 mg Oral Daily with Miryam Scott MD   500 mg at 04/18/24 1812    risperiDONE (risperDAL) tablet 1 mg  1 mg Oral BID Rosana Oneilmoni M, APRN CNP   1 mg at 04/19/24 0820    traZODone (DESYREL) half-tab 25 mg  25 mg Oral At Bedtime PRN Miryam Barraza MD        Or    traZODone (DESYREL) tablet 50 mg  50 mg Oral At Bedtime PRN Miryam Barraza MD   50 mg at 04/18/24 2009       Labs and Imaging:  New results:   Recent Results (from the past 24 hour(s))   Lipid panel    Collection Time: 04/19/24  8:14 AM   Result Value Ref Range     "Cholesterol 142 <200 mg/dL    Triglycerides 176 (H) <150 mg/dL    Direct Measure HDL 37 (L) >=50 mg/dL    LDL Cholesterol Calculated 70 <=100 mg/dL    Non HDL Cholesterol 105 <130 mg/dL   Hemoglobin A1c    Collection Time: 04/19/24  8:14 AM   Result Value Ref Range    Hemoglobin A1C 6.6 (H) <5.7 %   TSH with free T4 reflex and/or T3 as indicated    Collection Time: 04/19/24  8:14 AM   Result Value Ref Range    TSH 0.63 0.30 - 4.20 uIU/mL       Data this admission:  - CBC unremarkable  - CMP unremarkable  - TSH normal  - UDS negative  - Hgb A1c 6.6%, on metformin OP, at goal  - Lipids with elevated triglycerides to 176  - Vit B12 pending  - Folate pending  - Urinalysis unremarkable  - EKG normal sinus rhythm, QTc 430     Mental Status Exam:     Oriented to:  Grossly Oriented  General:  Awake and Alert  Appearance:  appears stated age and Grooming is adequate  Behavior/Attitude:  Calm and Cooperative  Eye Contact: Appropriate  Psychomotor: Normal no catatonia present  Speech:  appropriate volume/tone  Language: Fluent in English with appropriate syntax and vocabulary.  Mood:  \"not good\"  Affect:  appropriate  Thought Process:  linear, coherent, and goal directed  Thought Content:   suicidal ideation (passive); No apparent delusions  Associations:  intact  Insight:  partial due to resistance to engage in therapy options  Judgment:  partial due to partial insight  Impulse control: partial  Attention Span:  grossly intact  Concentration:  grossly intact  Recent and Remote Memory:  grossly intact  Fund of Knowledge: average  Muscle Strength and Tone: normal  Gait and Station: Normal     Psychiatric Assessment     Sveta Cuellar is a 38 year old female previously diagnosed with who presented voluntarily with group home staff in the context of SI. Most recent psychiatric hospitalization was 1/5/2024-1/7/2024. Significant symptoms on admission include depressed mood, anxiety, suicidal ideation. The MSE on admission was " pertinent for dysphoric mood and affect. Biological contributions to mental health presentation include hx of TIGIST, MDD, PTSD, BPD.  Psychological contributions to mental health presentation include poor frustration tolerance, maladaptive coping skills, emotional dysregulation. Social factors contributing to mental health presentation include  inadequate support system, unemployment, (support system, interpersonal conflicts, work, education, legal, financial). Protective factors include help seeking behavior, established psychiatric care, medication compliance.       In summary, the patient's reported symptoms of suicidal ideation, depressed mood, affect instability, in the context of being overwhelmed and frustrated are consistent with Borderline Personally Disorder and possible comorbid depressive disorder. Differentials include Trauma- and stressor-related disorders, Adjustment Disorder, MDD with SI. She will likely benefit from this admission.     Given that she currently has SI, patient warrants inpatient psychiatric hospitalization to maintain her safety.      Psychiatric Plan by Diagnosis      Today's changes:  - Discontinue Zyprexa PRN  -Possible Discharge Monday, restart DBT  - start Prazosin 1mg PO BID      # MDD  # BPD  #TIGIST  1. Medications:  - desvenlafaxine (Pristiq) 24 hr tablet 50 mg Daily   - Gabapentin capsule 600mg at bedtime  - Risperidone 1 mg PO BID    # PTSD  - Prazosin 1mg PO BID         2. Pertinent Labs/Monitoring:   - EKG automated report:Sinus rhythm, Nonspecific T wave abnormality, Abnormal ECG, No previous ECGs available   No Acute Concerns     3. Additional Plans:  - Patient will be treated in therapeutic milieu with appropriate individual and group therapies as described     Psychiatric Hospital Course:      Sveta Cuellar was admitted to [facility] as a voluntary patient.   Medications:  PTA - desvenlafaxine (Pristiq) 24 hr tablet 50 mg Daily, Gabapentin capsule 600mg at bedtime,  Risperidone 1 mg PO BID were continued.  No new medications started at the time of admission.   Prazosin 1mg PO BID started on 4/19 for PTSD symptoms,      The risks, benefits, alternatives, and side effects were discussed and understood by the patient and other caregivers.     Medical Assessment and Plan     Medical diagnoses to be addressed this admission:    # DM  Metformin 24 hr tablet 500 mg daily with supper     # HLP       Medical course: Patient was physically examined by the ED prior to being transferred to the unit and was found to be medically stable and appropriate for admission.      Consults:  none     Checklist     Legal Status: Voluntary     Safety Assessment:   Behavioral Orders   Procedures    Code 1 - Restrict to Unit    [facility] Extended Care     Until discharge, Extended Care to offer psychotherapeutic services to mental health patients boarding for admission or stabilization. These services are to include but are not limited to: individual psychotherapy, diagnostic assessment, case management and care planning, safety planning, etc. This may include up to 1 visit per day. If patient is physically located at [facility] or [facility], group psychotherapy up to 2 time per day may be offered.     Routine Programming     As clinically indicated    Self Injury Precaution    Status 15     Every 15 minutes.    Suicide precautions: Suicide Risk: HIGH     Patients on Suicide Precautions should have a Combination Diet ordered that includes a Diet selection(s) AND a Behavioral Tray selection for Safe Tray - with utensils, or Safe Tray - NO utensils       Order Specific Question:   Suicide Risk     Answer:   HIGH       Risk Assessment:  Risk for harm is moderate.  Risk factors: SI, maladaptive coping, trauma, and family history  Protective factors: engaged in treatment ,     SIO: no    Disposition:  Pending stabilization, medication optimization, & development of a safe discharge plan.     Attestations     Patient was  "seen and discussed with attending physician, Dr. Frost , who agrees with the assessment and plan.       Dorothy \"Jesi\" MD [facility]  PGY 2, Psychiatry Resident   "

## 2024-04-19 NOTE — PLAN OF CARE
Team Note Due:  Thursday    Assessment/Intervention/Current Symtoms and Care Coordination:  Chart review and met with team, discussed pt progress, symptomology, and response to treatment. Discussed the discharge plan and any potential impediments to discharge. Discussed the possibility of pt discharging back to group home on Monday, pending how things go over the weekend.  Scheduled pt's follow up psychiatry appointment with [facility] at [facility]. Info in AVS.  Called [facility] to reschedule DBT for as soon as possible; left voicemail requesting call back.  Called pt's group home to discuss possibility of pt returning on Monday. Spoke to Thiago who confirmed that would be no problem. Agreed to touch base Monday morning regarding concrete plan.     Discharge Plan or Goal:  Group home with outpatient support (DBT, psychiatrist, )     Barriers to Discharge:  Symptom stabilization, med management, care coordination     Referral Status:  Scheduled follow up with established providers (Psychiatry through [facility], DBT through [facility])     Legal Status:  Voluntary     Contacts:  Medication Management/Psychiatry:  Name/Clinic: [facility]  Address: [place] [address]  Phone: [phone]     Group Home:  [facility] ([facility]  [phone]      :  Name/Clinic: [facility]   Number: [phone]      Upcoming Meetings and Dates/Important Information and next steps:  Update group home regarding pt's discharge plan  Reschedule DBT intake with [facility]

## 2024-04-19 NOTE — DISCHARGE INSTRUCTIONS
Behavioral Discharge Planning and Instructions    Summary: You were admitted on 4/17/2024  due to Suicidal Ideations.  You were treated by Dr. Frost and discharged on 04/22/24 from [facility] to Group Home    Main Diagnosis: Major Depressive Disorder    Health Care Follow-up:     DBT Intake with [facility]  Appointment Date/Time: Thursday, April 25th at 11:00am  Provider: Mary Ann Ryan  Address: [address] [place] [address]. [address]  [place] [address]  Phone Number: [phone]  Fax Number: [phone]    Psychiatry Appointment  Appointment Date/Time: Wednesday, May 1st at 11am  Provider: Krzysztof Wall[facility], JONATHAN, APRN, PMASHLIE-BC  Address: [place] [address]  Phone Number: [phone]  Fax Number: [phone]    Attend all scheduled appointments with your outpatient providers. Call at least 24 hours in advance if you need to reschedule an appointment to ensure continued access to your outpatient providers.     Major Treatments, Procedures and Findings:  You were provided with: a psychiatric assessment, assessed for medical stability, medication evaluation and/or management, group therapy, individual therapy, and milieu management    Symptoms to Report: feeling more aggressive, increased confusion, losing more sleep, mood getting worse, or thoughts of suicide    Early warning signs can include: increased depression or anxiety sleep disturbances increased thoughts or behaviors of suicide or self-harm  increased unusual thinking, such as paranoia or hearing voices    Safety and Wellness:  Take all medicines as directed.  Make no changes unless your doctor suggests them.      Follow treatment recommendations.  Refrain from alcohol and non-prescribed drugs.  If there is a concern for safety, call 911.    Resources:   Crisis Intervention: 337.846.7104 or 853-279-7276 (TTY: 179.610.1069).  Call anytime for help.  National [place] on Mental Illness  "(www.mn.mariely.org): 454.787.2060 or 456-471-6625.  Suicide Awareness Voices of Education (SAVE) (www.save.org): 542-211-RLRH (5364)  National Suicide Prevention Line (www.mentalhealthmn.org): 234-502-KIRO (7014)  [facility] Rapid Response 341-742-6535  Text 4 Life: txt \"LIFE\" to 96499 for immediate support and crisis intervention  Crisis text line: Text \"[place]\" to 566873. Free, confidential, 24/7.  Crisis Intervention: 886.310.6864 or 546-874-1395. Call anytime for help.     General Medication Instructions:   See your medication sheet(s) for instructions.   Take all medicines as directed.  Make no changes unless your doctor suggests them.   Go to all your doctor visits.  Be sure to have all your required lab tests. This way, your medicines can be refilled on time.  Do not use any drugs not prescribed by your doctor.  Avoid alcohol.    Advance Directives:   Scanned document on file with [employer]? No scanned doc  Is document scanned? Pt states no documents  Honoring Choices Your Rights Handout: Informed and given  Was more information offered? Pt declined    The Treatment team has appreciated the opportunity to work with you. If you have any questions or concerns about your recent admission, you can contact the unit which can receive your call 24 hours a day, 7 days a week. They will be able to get in touch with a Provider if needed. The unit number is [phone].  "

## 2024-04-19 NOTE — PLAN OF CARE
Pt appeared sleeping for 7 hours without breathing difficulty  No safety concern noted on 15 minutes rounds  No pain or discomfort endorsed  No inappropriate behavior noted.       Problem: Sleep Disturbance  Goal: Adequate Sleep/Rest  Outcome: Progressing     Problem: Suicide Risk  Goal: Absence of Self-Harm  Outcome: Progressing   Goal Outcome Evaluation:

## 2024-04-20 LAB — VIT B12 SERPL-MCNC: 375 PG/ML (ref 232–1245)

## 2024-04-20 PROCEDURE — 124N000002 HC R&B MH UMMC

## 2024-04-20 PROCEDURE — 250N000013 HC RX MED GY IP 250 OP 250 PS 637

## 2024-04-20 PROCEDURE — 250N000013 HC RX MED GY IP 250 OP 250 PS 637: Performed by: EMERGENCY MEDICINE

## 2024-04-20 PROCEDURE — 250N000013 HC RX MED GY IP 250 OP 250 PS 637: Performed by: STUDENT IN AN ORGANIZED HEALTH CARE EDUCATION/TRAINING PROGRAM

## 2024-04-20 PROCEDURE — G0177 OPPS/PHP; TRAIN & EDUC SERV: HCPCS

## 2024-04-20 RX ORDER — ATORVASTATIN CALCIUM 20 MG/1
20 TABLET, FILM COATED ORAL AT BEDTIME
Status: DISCONTINUED | OUTPATIENT
Start: 2024-04-20 | End: 2024-04-22 | Stop reason: HOSPADM

## 2024-04-20 RX ORDER — GABAPENTIN 100 MG/1
200 CAPSULE ORAL EVERY 6 HOURS PRN
Status: DISCONTINUED | OUTPATIENT
Start: 2024-04-20 | End: 2024-04-22 | Stop reason: HOSPADM

## 2024-04-20 RX ORDER — PROPRANOLOL HYDROCHLORIDE 10 MG/1
10 TABLET ORAL 2 TIMES DAILY PRN
Status: DISCONTINUED | OUTPATIENT
Start: 2024-04-20 | End: 2024-04-22 | Stop reason: HOSPADM

## 2024-04-20 RX ADMIN — GABAPENTIN 200 MG: 100 CAPSULE ORAL at 18:25

## 2024-04-20 RX ADMIN — PRAZOSIN HYDROCHLORIDE 1 MG: 1 CAPSULE ORAL at 08:26

## 2024-04-20 RX ADMIN — RISPERIDONE 1 MG: 1 TABLET, FILM COATED ORAL at 20:35

## 2024-04-20 RX ADMIN — GABAPENTIN 600 MG: 300 CAPSULE ORAL at 20:35

## 2024-04-20 RX ADMIN — PROPRANOLOL HYDROCHLORIDE 10 MG: 10 TABLET ORAL at 14:13

## 2024-04-20 RX ADMIN — HYDROXYZINE HYDROCHLORIDE 50 MG: 25 TABLET, FILM COATED ORAL at 08:34

## 2024-04-20 RX ADMIN — TRAZODONE HYDROCHLORIDE 50 MG: 50 TABLET ORAL at 20:35

## 2024-04-20 RX ADMIN — METFORMIN HYDROCHLORIDE 500 MG: 500 TABLET, EXTENDED RELEASE ORAL at 17:32

## 2024-04-20 RX ADMIN — Medication 5 MG: at 20:34

## 2024-04-20 RX ADMIN — DESVENLAFAXINE 50 MG: 50 TABLET, FILM COATED, EXTENDED RELEASE ORAL at 08:27

## 2024-04-20 RX ADMIN — ATORVASTATIN CALCIUM 20 MG: 20 TABLET, FILM COATED ORAL at 20:34

## 2024-04-20 RX ADMIN — PRAZOSIN HYDROCHLORIDE 1 MG: 1 CAPSULE ORAL at 20:35

## 2024-04-20 RX ADMIN — RISPERIDONE 1 MG: 1 TABLET, FILM COATED ORAL at 08:27

## 2024-04-20 RX ADMIN — GABAPENTIN 200 MG: 100 CAPSULE ORAL at 11:03

## 2024-04-20 ASSESSMENT — ACTIVITIES OF DAILY LIVING (ADL)
DRESS: INDEPENDENT
ADLS_ACUITY_SCORE: 31
ORAL_HYGIENE: INDEPENDENT
ADLS_ACUITY_SCORE: 31
ORAL_HYGIENE: INDEPENDENT
HYGIENE/GROOMING: INDEPENDENT
HYGIENE/GROOMING: INDEPENDENT
ADLS_ACUITY_SCORE: 31
LAUNDRY: WITH SUPERVISION
ADLS_ACUITY_SCORE: 31
DRESS: INDEPENDENT
ADLS_ACUITY_SCORE: 31
ADLS_ACUITY_SCORE: 31

## 2024-04-20 NOTE — PROGRESS NOTES
04/19/24 1900   Group Therapy Session   Group Attendance attended group session   Time Session Began 1715   Time Session Ended 1800   Total Time (minutes) 45   Total # Attendees 5   Group Type recreation   Group Topic Covered relaxation techniques   Group Session Detail stress relief   Patient Response/Contribution cooperative with task   Patient Participation Detail Pt actively participated in a structured Therapeutic Recreation group with a focus on leisure participation, socializing, and exercise. Pt participated in the guided exercise for the full duration of the group. Pt followed along, engaged in the guided chair exercise routine and added to the discussion prompts throughout the routine.  Pt was encouraged to use positive imagery with the deep breathing and stretching to foster relaxation, improves focus, and reduce stress.

## 2024-04-20 NOTE — PLAN OF CARE
"Problem: Adult Behavioral Health Plan of Care  Goal: Plan of Care Review  Outcome: Progressing  Flowsheets (Taken 4/20/2024 0923)  Plan of Care Reviewed With: patient  Overall Patient Progress: improving  Patient Agreement with Plan of Care: agrees     Problem: Adult Behavioral Health Plan of Care  Goal: Optimized Coping Skills in Response to Life Stressors  Outcome: Progressing  Flowsheets (Taken 4/20/2024 0923)  Optimized Coping Skills in Response to Life Stressors: making progress toward outcome     Problem: Suicide Risk  Goal: Absence of Self-Harm  Outcome: Progressing     Problem: Anxiety Signs/Symptoms  Goal: Improved Mood Symptoms (Anxiety Signs/Symptoms)  Outcome: Progressing  Flowsheets (Taken 4/20/2024 0923)  Mutually Determined Action Steps (Improved Mood Symptoms):   adheres to medication regimen   shares insight re: need for meds   Goal Outcome Evaluation:      Plan of Care Reviewed With: patient    Overall Patient Progress: improvingOverall Patient Progress: improving     Clarita continues to c/o continuous anxiety which interferes in her ability to function, \"I feel like I want to jump out of my body!\"-requested hydroxyzine 50 mg PO at 0847 which was ineffective in relieving anxiety-cooperative with use of ice pack and deep breathing-which she stated \"helped a little bit\"-noted to be restless-seen by on call Dr Sears- gabapentin 200 mg PO PRN prescribed which she received at 1103 with moderate relief-continues ice pack to face with controlled breathing-1413 c/o feeling she has to move her legs continuously- agreed to try propranolol 10 mg PO despite negative experience with propranolol in past-at 1445 reported relief from feeling she had to move legs-resting comfortably in room-commented, but remember I still have depression          "

## 2024-04-20 NOTE — PLAN OF CARE
Pt appeared sleeping  for 7 hours with uneventful night  No pain or discomfort verbalized  No  aggressive  behaviors demonstrated  No SI/HI, delusions, or hallucination noted.    Problem: Suicide Risk  Goal: Absence of Self-Harm  Outcome: Progressing     Problem: Adult Behavioral Health Plan of Care  Goal: Adheres to Safety Considerations for Self and Others  Intervention: Develop and Maintain Individualized Safety Plan  Recent Flowsheet Documentation  Taken 4/20/2024 0133 by Esthela Castle RN  Safety Measures: safety rounds completed  Goal: Absence of New-Onset Illness or Injury  Intervention: Identify and Manage Fall Risk  Recent Flowsheet Documentation  Taken 4/20/2024 0133 by Esthela Castle RN  Safety Measures: safety rounds completed   Goal Outcome Evaluation:

## 2024-04-20 NOTE — PROGRESS NOTES
04/20/24 1505   Group Therapy Session   Group Attendance attended group session   Time Session Began 1015   Time Session Ended 1100   Total Time (minutes) 45   Total # Attendees 4   Group Type recreation   Group Session Detail Education and group discussion provided on connecting with family and friends with hands on Skip Marv card game for concentration, learning something new, tracking, sequencing, simple problem solving and strategizing, frustration tolerance coping, mood stabilization, reality-based activity, an opportunity to experience success and expanding social skills.   Patient Participation Detail Pt was quiet, but spoke when spoken to first.  Pt shared during the check in question that she likes to connect with friend and family through outings such as to the aquarium or a park.  Pt was adept at playing the card game as it was a familiar activity to her.  Pt was pleasant, tolerant of others and won the game in a graceful manner.

## 2024-04-20 NOTE — PLAN OF CARE
"Goal Outcome Evaluation:         Pt observed sitting in milieu, but minimal interaction with peers.   But pt did attend and participated in group.   Pt observed slowly pacing levine at one point, squeezing stress ball.  Pt reported anxiety = 9; therefore, given prn to good effect.  Pt said \"I wish they would give me something stronger for anxiety.\"  \"I wish they wouldn't just say DBT.\"  Pt requested prns for sleep along with HS meds.                 "

## 2024-04-21 PROCEDURE — 250N000013 HC RX MED GY IP 250 OP 250 PS 637

## 2024-04-21 PROCEDURE — 124N000002 HC R&B MH UMMC

## 2024-04-21 PROCEDURE — 250N000013 HC RX MED GY IP 250 OP 250 PS 637: Performed by: EMERGENCY MEDICINE

## 2024-04-21 RX ADMIN — DESVENLAFAXINE 50 MG: 50 TABLET, FILM COATED, EXTENDED RELEASE ORAL at 08:15

## 2024-04-21 RX ADMIN — PROPRANOLOL HYDROCHLORIDE 10 MG: 10 TABLET ORAL at 09:46

## 2024-04-21 RX ADMIN — RISPERIDONE 1 MG: 1 TABLET, FILM COATED ORAL at 08:14

## 2024-04-21 RX ADMIN — TRAZODONE HYDROCHLORIDE 50 MG: 50 TABLET ORAL at 20:09

## 2024-04-21 RX ADMIN — ATORVASTATIN CALCIUM 20 MG: 20 TABLET, FILM COATED ORAL at 20:04

## 2024-04-21 RX ADMIN — PRAZOSIN HYDROCHLORIDE 1 MG: 1 CAPSULE ORAL at 20:04

## 2024-04-21 RX ADMIN — GABAPENTIN 600 MG: 300 CAPSULE ORAL at 20:04

## 2024-04-21 RX ADMIN — METFORMIN HYDROCHLORIDE 500 MG: 500 TABLET, EXTENDED RELEASE ORAL at 17:38

## 2024-04-21 RX ADMIN — GABAPENTIN 200 MG: 100 CAPSULE ORAL at 08:14

## 2024-04-21 RX ADMIN — PRAZOSIN HYDROCHLORIDE 1 MG: 1 CAPSULE ORAL at 08:15

## 2024-04-21 RX ADMIN — Medication 5 MG: at 20:08

## 2024-04-21 RX ADMIN — RISPERIDONE 1 MG: 1 TABLET, FILM COATED ORAL at 20:04

## 2024-04-21 ASSESSMENT — ACTIVITIES OF DAILY LIVING (ADL)
ADLS_ACUITY_SCORE: 31
LAUNDRY: WITH SUPERVISION
ADLS_ACUITY_SCORE: 31
ORAL_HYGIENE: INDEPENDENT
ADLS_ACUITY_SCORE: 31
ADLS_ACUITY_SCORE: 31
DRESS: INDEPENDENT
ADLS_ACUITY_SCORE: 31
ADLS_ACUITY_SCORE: 31
LAUNDRY: WITH SUPERVISION
ADLS_ACUITY_SCORE: 31
ADLS_ACUITY_SCORE: 31
DRESS: INDEPENDENT
ADLS_ACUITY_SCORE: 31
HYGIENE/GROOMING: INDEPENDENT
ORAL_HYGIENE: INDEPENDENT
HYGIENE/GROOMING: INDEPENDENT
ADLS_ACUITY_SCORE: 31

## 2024-04-21 NOTE — PLAN OF CARE
Pt appeared sleeping for 7 hours without discomfort.   Continue on Suicide/SIB  precautions with absence of such precautions this shift  No pain or discomfort verbalized  No reported safety concern this shift       Problem: Sleep Disturbance  Goal: Adequate Sleep/Rest  Outcome: Progressing   Goal Outcome Evaluation:

## 2024-04-21 NOTE — PLAN OF CARE
"Problem: Adult Behavioral Health Plan of Care  Goal: Plan of Care Review  Outcome: Progressing  Flowsheets (Taken 4/21/2024 1424)  Plan of Care Reviewed With: patient  Overall Patient Progress: improving  Patient Agreement with Plan of Care: agrees     Problem: Adult Behavioral Health Plan of Care  Goal: Optimized Coping Skills in Response to Life Stressors  Outcome: Progressing  Flowsheets (Taken 4/21/2024 1424)  Optimized Coping Skills in Response to Life Stressors: making progress toward outcome     Problem: Depressive Signs/Symptoms  Goal: Improved Mood Symptoms (Depressive Signs/Symptoms)  Outcome: Progressing  Flowsheets (Taken 4/21/2024 1424)  Mutually Determined Action Steps (Improved Mood Symptoms): identifies personal treatment goal     Problem: Anxiety Signs/Symptoms  Goal: Improved Mood Symptoms (Anxiety Signs/Symptoms)  Outcome: Progressing  Flowsheets (Taken 4/21/2024 1424)  Mutually Determined Action Steps (Improved Mood Symptoms):   adheres to medication regimen   shares insight re: need for meds   names external triggers   Goal Outcome Evaluation:      Plan of Care Reviewed With: patient    Overall Patient Progress: improvingOverall Patient Progress: improving     Sveta requested gabapentin 200 mg PO at 0814 for c/o anxiety-reports moderately effective, but 0946 reported increased restlessness which she feels contributes to her anxiety-received propranolol 10 mg PO at 0946 which states significantly reduced restlessness-upset this AM because \"everyone keeps talking about DBT\"-able to engage in conversation of DBT as supplement that works with medication-Sveta continues to report intrusive suicidal thoughts that are upsetting to her, but no specific plan-continues to report fear she will impulsively act on these thoughts-expresses fear she will be discharged before she is stable and able to successfully engage in outpt therapy-sad this afternoon watching peers receive visits from family-does feel " grateful for one sister she continues to talk with who lives in [place]-

## 2024-04-21 NOTE — PLAN OF CARE
"Goal Outcome Evaluation:    Plan of Care Reviewed With: patient          Pt out in milieu majority of shift, watching TV or working on puzzle, rarely interacting  with peers.   Pt denied any anxiety at beginning of shift (ie \"I think the propranolol helps.\"; but later in shift, pt requested Neurontin prn to fair effect.  Pt still endorses SI, describing it as chronic, \"But less with the new medication.\"   Pt added quickly, \"A little bit less.\"  Pt again said she needs more than DBT Therapy.           "

## 2024-04-22 VITALS
RESPIRATION RATE: 18 BRPM | DIASTOLIC BLOOD PRESSURE: 81 MMHG | OXYGEN SATURATION: 95 % | TEMPERATURE: 97.8 F | WEIGHT: 224.2 LBS | BODY MASS INDEX: 44.02 KG/M2 | SYSTOLIC BLOOD PRESSURE: 125 MMHG | HEART RATE: 102 BPM | HEIGHT: 60 IN

## 2024-04-22 PROBLEM — E11.9 DIABETES MELLITUS (H): Status: ACTIVE | Noted: 2024-04-22

## 2024-04-22 PROCEDURE — 99239 HOSP IP/OBS DSCHRG MGMT >30: CPT | Mod: GC | Performed by: PSYCHIATRY & NEUROLOGY

## 2024-04-22 PROCEDURE — 250N000013 HC RX MED GY IP 250 OP 250 PS 637

## 2024-04-22 RX ORDER — PROPRANOLOL HYDROCHLORIDE 10 MG/1
10 TABLET ORAL 2 TIMES DAILY PRN
Qty: 20 TABLET | Refills: 0 | Status: SHIPPED | OUTPATIENT
Start: 2024-04-22

## 2024-04-22 RX ORDER — RISPERIDONE 1 MG/1
1 TABLET ORAL 2 TIMES DAILY
Qty: 30 TABLET | Refills: 0 | Status: SHIPPED | OUTPATIENT
Start: 2024-04-22

## 2024-04-22 RX ORDER — METFORMIN HCL 500 MG
500 TABLET, EXTENDED RELEASE 24 HR ORAL
Qty: 30 TABLET | Refills: 0 | Status: SHIPPED | OUTPATIENT
Start: 2024-04-22

## 2024-04-22 RX ORDER — DESVENLAFAXINE 50 MG/1
50 TABLET, FILM COATED, EXTENDED RELEASE ORAL DAILY
Qty: 30 TABLET | Refills: 0 | Status: SHIPPED | OUTPATIENT
Start: 2024-04-23 | End: 2024-04-22

## 2024-04-22 RX ORDER — PRAZOSIN HYDROCHLORIDE 1 MG/1
1 CAPSULE ORAL 2 TIMES DAILY
Qty: 30 CAPSULE | Refills: 0 | Status: SHIPPED | OUTPATIENT
Start: 2024-04-22

## 2024-04-22 RX ORDER — DESVENLAFAXINE 50 MG/1
50 TABLET, FILM COATED, EXTENDED RELEASE ORAL DAILY
Qty: 30 TABLET | Refills: 0 | Status: SHIPPED | OUTPATIENT
Start: 2024-04-23

## 2024-04-22 RX ORDER — GABAPENTIN 100 MG/1
200 CAPSULE ORAL EVERY 8 HOURS PRN
Qty: 30 CAPSULE | Refills: 0 | Status: SHIPPED | OUTPATIENT
Start: 2024-04-22

## 2024-04-22 RX ADMIN — PRAZOSIN HYDROCHLORIDE 1 MG: 1 CAPSULE ORAL at 08:12

## 2024-04-22 RX ADMIN — DESVENLAFAXINE 50 MG: 50 TABLET, FILM COATED, EXTENDED RELEASE ORAL at 08:13

## 2024-04-22 RX ADMIN — RISPERIDONE 1 MG: 1 TABLET, FILM COATED ORAL at 08:13

## 2024-04-22 RX ADMIN — GABAPENTIN 200 MG: 100 CAPSULE ORAL at 08:12

## 2024-04-22 RX ADMIN — PROPRANOLOL HYDROCHLORIDE 10 MG: 10 TABLET ORAL at 09:08

## 2024-04-22 ASSESSMENT — ACTIVITIES OF DAILY LIVING (ADL)
ADLS_ACUITY_SCORE: 31
ADLS_ACUITY_SCORE: 31
ORAL_HYGIENE: INDEPENDENT
ADLS_ACUITY_SCORE: 31
HYGIENE/GROOMING: INDEPENDENT
ADLS_ACUITY_SCORE: 31
DRESS: INDEPENDENT
ADLS_ACUITY_SCORE: 31
LAUNDRY: WITH SUPERVISION
ADLS_ACUITY_SCORE: 31

## 2024-04-22 NOTE — PROGRESS NOTES
"  ----------------------------------------------------------------------------------------------------------  [facility]  Psychiatry Progress Note  Hospital Day #4     Interim History:     The patient's care was discussed with the treatment team and chart notes were reviewed.    Vitals: VSS  Sleep: 7 hours (04/22/24 0607)  Scheduled medications: Took all scheduled medications as prescribed  Psychiatric PRN medications:     Last 24H PRN:     gabapentin (NEURONTIN) capsule 200 mg, 200 mg at 04/22/24 0812    melatonin tablet 5 mg, 5 mg at 04/21/24 2008    propranolol (INDERAL) tablet 10 mg, 10 mg at 04/21/24 0946    traZODone (DESYREL) half-tab 25 mg **OR** traZODone (DESYREL) tablet 50 mg, 50 mg at 04/21/24 2009     Staff Report:   No acute events or safety concerns overnight . In summary, on 04/20: Patient continues to c/o continuous anxiety which interferes in her ability to function, \"I feel like I want to jump out of my body!\"-requested hydroxyzine 50 mg PO at 0847 which was ineffective in relieving anxiety-cooperative with use of ice pack and deep breathing-which she stated \"helped a little bit\"-noted to be restless-seen by on call Dr Sears- gabapentin 200 mg PO PRN prescribed which she received at 1103 with moderate relief-continues ice pack to face with controlled breathing-1413 c/o feeling she has to move her legs continuously- agreed to try propranolol 10 mg PO despite negative experience with propranolol in past-at 1445 reported relief from feeling she had to move legs-resting comfortably in room-commented, but remember I still have depression. Pt denied any anxiety at beginning of shift (ie \"I think the propranolol helps.\"; but later in shift, pt requested Neurontin prn to fair effect.  Pt still endorses SI, describing it as chronic, \"But less with the new medication.\"   Pt added quickly, \"A little bit less.\"  Pt again said she needs more than DBT Therapy.  " "  04/21: equested gabapentin 200 mg PO at 0814 for c/o anxiety-reports moderately effective, but 0946 reported increased restlessness which she feels contributes to her anxiety-received propranolol 10 mg PO at 0946 which states significantly reduced restlessness-upset this AM because \"everyone keeps talking about DBT\"-able to engage in conversation of DBT as supplement that works with medication-Sveta continues to report intrusive suicidal thoughts that are upsetting to her, but no specific plan-continues to report fear she will impulsively act on these thoughts-expresses fear she will be discharged before she is stable and able to successfully engage in outpt therapy-sad this afternoon watching peers receive visits from family-does feel grateful for one sister she continues to talk with who lives in [place]- \" Please see staff notes for details.  She was social to selected peers. Patient was cooperative upon approach. She claimed having anxiety, rated it 5/10 and depression rated it 6/10. Mood was anxious. Pt claimed her anxiety is due to her fear that she will be discharged tomorrow. She claimed being \"not ready\". Allowed patient to express thoughts and feelings. Encouraged her to discuss further her concerns with provider in the morning. Patient was accepting. Patient claimed having passive SI but does not have any plans on acting on it. She denies SIB/HI. She contracted for safety. She did not demonstrate any self injurious behavior. She denies experiencing any type of hallucinations. She claimed she has been using music, participating in unit activities, puzzles as her coping while in the unit.      Subjective:     Patient Interview:  Sveta Cuellar was interviewed in the group room. Reports \"I had suicidal ideation this morning and was crying a lot.\" Reading from notes during interview, reports her anxiety is high, wants to know what drugs can help with her symptoms or if her desvenlafaxine can be " "increased. Discussed with patient that these are good outpatient management questions. Wants her meds to be adjusted before she can start DBT. She started taking propranolol over the weekend, reports it \"helps me feel like I don't need to move my body.\" Reports history of hearing voices, no longer having symptoms since starting risperidone. Discussed the next best step is starting DBT and not being in the hospital. She completed a safety plan here. She is open to discharge today, concerned about being alone when discharged. Discussed availability of group home staff being 24/7.     ROS:  Patient has no bothersome physical symptoms  Patient denies acute concerns     Objective:     Vitals:  /80   Pulse 96   Temp 97.8  F (36.6  C) (Temporal)   Resp 18   Ht 1.524 m (5')   Wt 101.7 kg (224 lb 3.2 oz)   SpO2 95%   BMI 43.79 kg/m      Allergies:  Allergies   Allergen Reactions    Wellbutrin [Bupropion]      Nausea and dizziness       Current Medications:  Scheduled:  Current Facility-Administered Medications   Medication Dose Route Frequency Provider Last Rate Last Admin    acetaminophen (TYLENOL) tablet 650 mg  650 mg Oral Q4H PRN Jacob Morales MD        atorvastatin (LIPITOR) tablet 20 mg  20 mg Oral At Bedtime Dorothy Foley MD   20 mg at 04/21/24 2004    desvenlafaxine (PRISTIQ) 24 hr tablet 50 mg  50 mg Oral Daily Mariella Oneil APRN CNP   50 mg at 04/22/24 0813    gabapentin (NEURONTIN) capsule 200 mg  200 mg Oral Q6H PRN Celine Sears MD   200 mg at 04/22/24 0812    gabapentin (NEURONTIN) capsule 600 mg  600 mg Oral At Bedtime Mariella Oneil APRN CNP   600 mg at 04/21/24 2004    ibuprofen (ADVIL/MOTRIN) tablet 600 mg  600 mg Oral Q6H PRN Jacob Morales MD        melatonin tablet 5 mg  5 mg Oral At Bedtime PRN Miryam Barraza MD   5 mg at 04/21/24 2008    metFORMIN (GLUCOPHAGE XR) 24 hr tablet 500 mg  500 mg Oral Daily with supper Miryam Barraza MD   500 mg at 04/21/24 1738    " prazosin (MINIPRESS) capsule 1 mg  1 mg Oral BID Dorothy Foley MD   1 mg at 04/22/24 0812    propranolol (INDERAL) tablet 10 mg  10 mg Oral BID PRN Celine Sears MD   10 mg at 04/21/24 0946    risperiDONE (risperDAL) tablet 1 mg  1 mg Oral BID Mariella Oneil, APRN CNP   1 mg at 04/22/24 0813    traZODone (DESYREL) half-tab 25 mg  25 mg Oral At Bedtime PRN Miryam Barraza MD        Or    traZODone (DESYREL) tablet 50 mg  50 mg Oral At Bedtime PRN Miryam Barraza MD   50 mg at 04/21/24 2009       PRN:  Current Facility-Administered Medications   Medication Dose Route Frequency Provider Last Rate Last Admin    acetaminophen (TYLENOL) tablet 650 mg  650 mg Oral Q4H PRN Jacob Morales MD        atorvastatin (LIPITOR) tablet 20 mg  20 mg Oral At Bedtime Dorothy Foley MD   20 mg at 04/21/24 2004    desvenlafaxine (PRISTIQ) 24 hr tablet 50 mg  50 mg Oral Daily Mariella Oneil ANDRÉS, APRN CNP   50 mg at 04/22/24 0813    gabapentin (NEURONTIN) capsule 200 mg  200 mg Oral Q6H PRN Celine Sears MD   200 mg at 04/22/24 0812    gabapentin (NEURONTIN) capsule 600 mg  600 mg Oral At Bedtime Mariella Oneil ANDRÉS, APRN CNP   600 mg at 04/21/24 2004    ibuprofen (ADVIL/MOTRIN) tablet 600 mg  600 mg Oral Q6H PRN Jacob Morales MD        melatonin tablet 5 mg  5 mg Oral At Bedtime PRN Miryam Barraza MD   5 mg at 04/21/24 2008    metFORMIN (GLUCOPHAGE XR) 24 hr tablet 500 mg  500 mg Oral Daily with supper Miryam Barraza MD   500 mg at 04/21/24 1738    prazosin (MINIPRESS) capsule 1 mg  1 mg Oral BID Dorothy Foley MD   1 mg at 04/22/24 0812    propranolol (INDERAL) tablet 10 mg  10 mg Oral BID PRN Celine Sears MD   10 mg at 04/21/24 0946    risperiDONE (risperDAL) tablet 1 mg  1 mg Oral BID Mariella Oneil, APRN CNP   1 mg at 04/22/24 0813    traZODone (DESYREL) half-tab 25 mg  25 mg Oral At Bedtime PRN Miryam Barraza MD        Or    traZODone (DESYREL) tablet 50 mg  50 mg Oral At Bedtime PRN Miryam Barraza MD   " 50 mg at 04/21/24 2009       Labs and Imaging:  New results:   No results found for this or any previous visit (from the past 24 hour(s)).      Data this admission:  - CBC unremarkable  - CMP unremarkable  - TSH normal  - UDS negative  - Hgb A1c 6.6%, on metformin OP, at goal  - Lipids with elevated triglycerides to 176  - Vit B12 pending  - Folate pending  - Urinalysis unremarkable  - EKG normal sinus rhythm, QTc 430     Mental Status Exam:     Oriented to:  Grossly Oriented  General:  Awake and Alert  Appearance:  appears stated age and Grooming is adequate  Behavior/Attitude:  Calm and Cooperative  Eye Contact: Appropriate  Psychomotor: Normal no catatonia present  Speech:  appropriate volume/tone  Language: Fluent in English with appropriate syntax and vocabulary.  Mood:  \"bad\"  Affect:  appropriate  Thought Process:  linear, coherent, and goal directed  Thought Content:   suicidal ideation (passive); No apparent delusions  Associations:  intact  Insight:  partial due to resistance to engage in therapy options  Judgment:  partial due to partial insight  Impulse control: partial  Attention Span:  grossly intact  Concentration:  grossly intact  Recent and Remote Memory:  grossly intact  Fund of Knowledge: average  Muscle Strength and Tone: normal  Gait and Station: Normal     Psychiatric Assessment     Sveta Cuellar is a 38 year old female previously diagnosed with who presented voluntarily with group home staff in the context of SI. Most recent psychiatric hospitalization was 1/5/2024-1/7/2024. Significant symptoms on admission include depressed mood, anxiety, suicidal ideation. The MSE on admission was pertinent for dysphoric mood and affect. Biological contributions to mental health presentation include hx of TIGIST, MDD, PTSD, BPD.  Psychological contributions to mental health presentation include poor frustration tolerance, maladaptive coping skills, emotional dysregulation. Social factors contributing to " mental health presentation include  inadequate support system, unemployment, (support system, interpersonal conflicts, work, education, legal, financial). Protective factors include help seeking behavior, established psychiatric care, medication compliance.       In summary, the patient's reported symptoms of suicidal ideation, depressed mood, affect instability, in the context of being overwhelmed and frustrated are consistent with Borderline Personally Disorder and possible comorbid depressive disorder. Differentials include Trauma- and stressor-related disorders, Adjustment Disorder, MDD with SI. She will likely benefit from this admission.     Patient's symptoms ave improved significantly and is not at acute risk to harm self or others.      Psychiatric Plan by Diagnosis      Today's changes:  - Discharge today     # MDD  # BPD  #TIGIST  1. Medications:  - desvenlafaxine (Pristiq) 24 hr tablet 50 mg Daily   - Gabapentin capsule 600mg at bedtime  - Risperidone 1 mg PO BID    # PTSD  - Prazosin 1mg PO BID         2. Pertinent Labs/Monitoring:   - EKG automated report:Sinus rhythm, Nonspecific T wave abnormality, Abnormal ECG, No previous ECGs available   No Acute Concerns     3. Additional Plans:  - Patient will be treated in therapeutic milieu with appropriate individual and group therapies as described     Psychiatric Hospital Course:      Sveta Cuellar was admitted to [facility] as a voluntary patient.   Medications:  PTA - desvenlafaxine (Pristiq) 24 hr tablet 50 mg Daily, Gabapentin capsule 600mg at bedtime, Risperidone 1 mg PO BID were continued.  No new medications started at the time of admission.   Prazosin 1mg PO BID started on 4/19 for PTSD symptoms,      The risks, benefits, alternatives, and side effects were discussed and understood by the patient and other caregivers.     Medical Assessment and Plan     Medical diagnoses to be addressed this admission:    # DM  Metformin 24 hr tablet 500 mg daily  "with supper     # HLP  Hold Atorvastatin 20 mg po at bedtime, continue when discharged        Medical course: Patient was physically examined by the ED prior to being transferred to the unit and was found to be medically stable and appropriate for admission.      Consults:  none     Checklist     Legal Status: Voluntary     Safety Assessment:   Behavioral Orders   Procedures    Code 1 - Restrict to Unit    Routine Programming     As clinically indicated    Self Injury Precaution    Status 15     Every 15 minutes.    Suicide precautions: Suicide Risk: HIGH     Patients on Suicide Precautions should have a Combination Diet ordered that includes a Diet selection(s) AND a Behavioral Tray selection for Safe Tray - with utensils, or Safe Tray - NO utensils       Order Specific Question:   Suicide Risk     Answer:   HIGH       Risk Assessment:  Risk for harm is moderate.  Risk factors: SI, maladaptive coping, trauma, and family history  Protective factors: engaged in treatment ,     SIO: no    Disposition:  Pending stabilization, medication optimization, & development of a safe discharge plan.     Attestations     Patient was seen and discussed with attending physician, Dr. Frost , who agrees with the assessment and plan.       Dorothy \"Jesi\" MD [facility]  PGY 2, Psychiatry Resident       This patient has been seen and evaluated by me, Mario Frost.  I have discussed this patient with the psychiatry resident and I agree with the findings and plan in this note.    I have reviewed today's vital signs, medications, labs and imaging.     Mario Rudd MD    "

## 2024-04-22 NOTE — PLAN OF CARE
Team Note Due:  Thursday    Assessment/Intervention/Current Symtoms and Care Coordination:  Chart review and met with team, discussed pt progress, symptomology, and response to treatment. Discussed the discharge plan and any potential impediments to discharge.   Called [facility] to attempt to schedule DBT intake again. Set it up for this Thursday at 11am. Updated AVS.  Called pt's group home to update them on plan to discharge pt. They are open to her returning at any time. She will need to be sent home via cab.   Called pt's , [facility]. Updated her on plan to discharge pt this afternoon.     Discharge Plan or Goal:  Group home with outpatient support (DBT, psychiatrist, )     Barriers to Discharge:  None; discharge today     Referral Status:  Scheduled follow up with established providers (Psychiatry through [facility], DBT through [facility])     Legal Status:  Voluntary     Contacts:  Medication Management/Psychiatry:  Name[facility]Clinic: [facility]  Address: [place] [address]  Phone: [phone]     Group Home:  [facility]  [phone]      :  Name/Clinic: [facility]  [facility]   Number: [phone]      Upcoming Meetings and Dates/Important Information and next steps:

## 2024-04-22 NOTE — PLAN OF CARE
"Problem: Adult Behavioral Health Plan of Care  Goal: Optimized Coping Skills in Response to Life Stressors  Outcome: Progressing     Problem: Depressive Signs/Symptoms  Goal: Improved Mood Symptoms (Depressive Signs/Symptoms)  Outcome: Progressing   Goal Outcome Evaluation:    Plan of Care Reviewed With: patient     Patient observed out in the milieu. She was social to selected peers. Patient was cooperative upon approach. She claimed having anxiety, rated it 5/10 and depression rated it 6/10. Mood was anxious. Pt claimed her anxiety is due to her fear that she will be discharged tomorrow. She claimed being \"not ready\". Allowed patient to express thoughts and feelings. Encouraged her to discuss further her concerns with provider in the morning. Patient was accepting.   Patient claimed having passive SI but does not have any plans on acting on it. She denies SIB/HI. She contracted for safety. She did not demonstrate any self injurious behavior. She denies experiencing any type of hallucinations. She claimed she has been using music, participating in unit activities, puzzles as her coping while in the unit.     Medical Concerns: pt denies pain.   Appetite: Consumed 100% of share of dinner and took approximately 500 ml of fluids.   Sleep: pt claimed having difficulty sleeping. PRN Melatonin and PRN Trazodone for sleep given per request. Patient appeared to be sleeping at about 2045H.   LBM: \"today\"  ADLs: Independent.   PRNs given: PRN Melatonin and PRN Trazodone for sleep.     Due medications given. Denies experiencing side effects.    On suicide and self injury precautions. Needs attended to. No further concerns noted as of this time.                 "

## 2024-04-22 NOTE — PLAN OF CARE
BEH IP Unit Acuity Rating Score (UARS)  Patient is given one point for every criteria they meet.    CRITERIA SCORING   On a 72 hour hold, court hold, committed, stay of commitment, or revocation. 0    Patient LOS on BEH unit exceeds 20 days. 0  LOS: 4   Patient under guardianship, 55+, otherwise medically complex, or under age 11. 0   Suicide ideation without relief of precipitating factors. 1   Current plan for suicide. 1   Current plan for homicide. 0   Imminent risk or actual attempt to seriously harm another without relief of factors precipitating the attempt. 0   Severe dysfunction in daily living (ex: complete neglect for self care, extreme disruption in vegetative function, extreme deterioration in social interactions). 1   Recent (last 7 days) or current physical aggression in the ED or on unit. 0   Restraints or seclusion episode in past 72 hours. 0   Recent (last 7 days) or current verbal aggression, agitation, yelling, etc., while in the ED or unit. 0   Active psychosis. 0   Need for constant or near constant redirection (from leaving, from others, etc).  0   Intrusive or disruptive behaviors. 0   Patient requires 3 or more hours of individualized nursing care per 8-hour shift (i.e. for ADLs, meds, therapeutic interventions). 0   TOTAL 3

## 2024-04-22 NOTE — PLAN OF CARE
Problem: Adult Behavioral Health Plan of Care  Goal: Plan of Care Review  Outcome: Adequate for Care Transition  Flowsheets (Taken 4/22/2024 1256)  Plan of Care Reviewed With: patient  Overall Patient Progress: improving  Patient Agreement with Plan of Care: agrees     Problem: Adult Behavioral Health Plan of Care  Goal: Optimized Coping Skills in Response to Life Stressors  Outcome: Adequate for Care Transition  Flowsheets (Taken 4/22/2024 1256)  Optimized Coping Skills in Response to Life Stressors: making progress toward outcome     Problem: Suicide Risk  Goal: Absence of Self-Harm  Outcome: Adequate for Care Transition     Problem: Depressive Signs/Symptoms  Goal: Improved Mood Symptoms (Depressive Signs/Symptoms)  Outcome: Adequate for Care Transition   Goal Outcome Evaluation:    Plan of Care Reviewed With: patient Plan of Care Reviewed With: patient    Overall Patient Progress: improvingOverall Patient Progress: improving     Sveta discharged 1305 care of self-taxi to [facility] in [place]-Sveta reviewed AVS including medication schedule and outpt tx plans-states intention to continue meds as prescribed-states intention to continue outpt tx including DBT which is scheduled to begin Thursday- prescriptions filled for newly ordered meds-Sveta states previously prescribed meds are present at [facility] home-Discussed chronic SI with MDs and determined she will be able to be safe at [facility] home-provided with crisis numbers and text if feels she needs to talk with someone outside of [facility] home-meds sent by

## 2024-04-22 NOTE — DISCHARGE SUMMARY
"                                                                                                                 ----------------------------------------------------------------------------------------------------------  [facility]   Psychiatric Discharge Summary      Sveta Cuellar MRN# [id number]   Age: 38 year old YOB: 1985     Date of Admission:  4/17/2024  Date of Discharge:  4/22/2024  Admitting Physician:  Mario Frost MD  Discharge Physician:  Mario Frost MD    This document serves as a transfer of care to Sveta Cuellar's outpatient providers.     Events Leading to Hospitalization:     \"Sveta was interviewed in her room. Reports she \"has an ideation right now.\" Describes picturing herself \"with a noose around my neck, or cutting my wrist, or jumping off a bridge because my medication is locked my group home.\" Reports she underwent gene testing and was started on a desvenlafaxine 50mg less than 1 month ago in addition to risperidone. She has been \"struggling so much since then.\" Reports \"I don't want to live. I've had a rough life. I've done well on medications until I don't. I've been in and out of the hospitals.\" Reports significant PTSD in childhood, undergone therapy \"since I was 9 years old.\" She is unsure the trigger for new symptoms, thinks it could be med related. Describes \"I'm reliving all my past experiences that were tramatizing like giving up my son for adoption and feeling responsible for her nephew committing suicide.\" Reports moving to a group home early March because she was isolating at home and needed more help. She had a suicide attempt in November/December because her partner was cheating on her, overdosed on lithium or trazodone, reports is \"at least 6th attempt.\" She called 911 to tell them what she did and she was brought to the hospital for a couple of days, no ICU stay. Has come to " "the hospital before but \"they told me I don't have a plan so they weren't listening to my symptoms.\" She is upset that people are attributing her symptoms to BPD which she just learned she was diagnosed with. From [place], had a child in [place], reports she still sees her son in an \"open adoption.\" Reports her symptoms appear to be centered around major life events like moving and stopping talking to her parents. Relationship with her parents is \"priyank.\" Reports talking to people is the most helpful thing she can do, medications don't seem to help her much. Reports she was supposed to start DBT today. Has tried DBT for 4 months previously, was not certified. Asked patient what she was afraid of most, struggled identifying source. \"    See H&P by Mario Frost MD on 4/18/24 for additional details.      Diagnoses:   Primary Psychiatric Diagnosis  Borderline Personality Disorder    Secondary Psychiatric Diagnoses  TIGIST  MDD  PTSD    Psychiatric Assessment:   Sveta Cuellar is a 38 year old female previously diagnosed with who presented voluntarily with group home staff in the context of SI. Most recent psychiatric hospitalization was 1/5/2024-1/7/2024. Significant symptoms on admission include depressed mood, anxiety, suicidal ideation. The MSE on admission was pertinent for dysphoric mood and affect. Biological contributions to mental health presentation include hx of TIGIST, MDD, PTSD, BPD.  Psychological contributions to mental health presentation include poor frustration tolerance, maladaptive coping skills, emotional dysregulation. Social factors contributing to mental health presentation include  inadequate support system, unemployment, Protective factors include help seeking behavior, established psychiatric care, medication compliance.       In summary, the patient's reported symptoms of suicidal ideation, depressed mood, affect instability, in the context of being overwhelmed and frustrated are " consistent with Borderline Personally Disorder and possible comorbid depressive disorder. Differentials include Trauma- and stressor-related disorders, Adjustment Disorder, MDD with SI. Patient's sx improved over the course of hospitalization. Patient is engaged in treatment , is help seeking and wants to start the DBT treatment. Patient appears to be at her baseline and is not at acute risk of self harm or harming others, modifiable risk factors are addressed.       Psychiatric Hospital Course:     Sveta Cuellar was admitted to [facility] as a voluntary patient.   Medications:  PTA - desvenlafaxine (Pristiq) 24 hr tablet 50 mg Daily, Gabapentin capsule 600mg at bedtime, Risperidone 1 mg PO BID were continued.  No new medications started at the time of admission.   Prazosin 1mg PO BID started on 4/19 for PTSD symptoms.  Gabapentin 200 mg ordered PRN for anxiety.   Hydroxyzine PO PRN for anxiety discontinued due to patient's preference.     The risks, benefits, alternatives, and side effects were discussed and understood by the patient and other caregivers.  Level of medication adherence: Adherent  Behaviors: The patient was safe and appropriate and did not require chemical/physical restraints during admission. She was cooperative with cares, had good group attendance, and was visible in the milieu. Safety planning was done and patient was engaged in the plan.   Change in psychiatric symptoms: Over the course of this hospitalization the patient's symptoms of SI, anxiety depressed mood have improved.Sveta was released to group home. At the time of discharge she was determined to not be a danger to herself or others.     Risk Assessment:      Today Sveta Cuellar denies AH, VH, or active intentions of harm herself.   Patient does not appear to be psychotic, manic or withdrawing. Patient suffers from chronic suicidal ideation, emotional dysregulation, low frustration tolerance, and affective instabilty in the  "context of BPD, and past trauma.  Modiafiable risk factors were addressed during this hospitalization,  and patient appears to be at her baseline. Patient will be starting DBT 04/25. Patient has notable risk factors for self-harm, including anxiety, chronic SI, maladaptive coping skills, significant hx of trauma, low frustration tolerance. However, risk is mitigated by ability to volunteer a safety plan, help seeking behavior, living in a group home, having her medication in a locked cabinet. Therefore, based on all available evidence including the factors cited above, she does not appear to be at imminent risk for self-harm, does not meet criteria for a 72-hr hold, and therefore remains appropriate for ongoing outpatient level of care. Additional steps taken to minimize risk include: medication optimization, close psychiatric follow up and provision of crisis resources . Voluntary referral for day treatment, and DBT program was offered, she accepted this offer. Safety planning was done.     Psychiatric Examination:     Interval History: Sveta Cuellar was interviewed in the group room on th day of the discharge. Reports \"I had suicidal ideation this morning and was crying a lot.\" Reading from notes during interview, reports she has anxiety wants to know what drugs can help with her symptoms or if her desvenlafaxine can be increased. Discussed with patient that these are good outpatient management questions. Wants her meds to be adjusted before she can start DB, it was explained that most of her symptoms can be treated by DBT and therapy alongside her medication. She started taking propranolol over the weekend, reports it \"helps me feel like I don't need to move my body.\" Reports history of hearing voices, no longer having symptoms since starting risperidone. Discussed the next best step is starting DBT and not being in the hospital. She completed a safety plan here. She is open to discharge today, concerned " "about being alone when discharged. Discussed availability of group home staff being 24/7. She stated that she would reach out to the staff if she has SI.       Mental Status Exam:  Oriented to:  Grossly Oriented  General:  Awake and Alert  Appearance:  appears stated age and Grooming is adequate  Behavior/Attitude:  Calm and Cooperative  Eye Contact: Appropriate  Psychomotor: Normal no catatonia present  Speech:  appropriate volume/tone  Language: Fluent in English with appropriate syntax and vocabulary.  Mood:  \"I have anxiety\"  Affect:  appropriate  Thought Process:  linear, coherent, and goal directed  Thought Content:   suicidal ideation (passive); No apparent delusions  Associations:  intact  Insight:  partial due to resistance to engage in therapy options  Judgment:  partial due to partial insight  Impulse control: partial  Attention Span:  grossly intact  Concentration:  grossly intact  Recent and Remote Memory:  grossly intact  Fund of Knowledge: average  Muscle Strength and Tone: WNL     Medical Hospital Course:   Medical diagnoses to be addressed this admission:    # DM  Metformin 24 hr tablet 500 mg daily with supper     # HLP   Atorvastatin 20 mg po at bedtime continue taking at the group home     Medical course: Patient was physically examined by the ED prior to being transferred to the unit and was found to be medically stable and appropriate for admission.      Consults:  none    Labs were notable for the following:  - CBC unremarkable  - CMP unremarkable  - TSH normal  - UDS negative  - Hgb A1c 6.6%, on metformin OP, at goal  - Lipids with elevated triglycerides to 176  - Vit B12 pending  - Folate pending  - Urinalysis unremarkable  - EKG normal sinus rhythm, QTc 430     Discharge Medications:     Current Discharge Medication List        START taking these medications    Details   prazosin (MINIPRESS) 1 MG capsule Take 1 capsule (1 mg) by mouth 2 times daily  Qty: 30 capsule, Refills: 0    Associated " Diagnoses: PTSD (post-traumatic stress disorder); Disassociation      propranolol (INDERAL) 10 MG tablet Take 1 tablet (10 mg) by mouth 2 times daily as needed (for akathisia, restlessness)  Qty: 20 tablet, Refills: 0    Associated Diagnoses: TIGIST (generalized anxiety disorder); PTSD (post-traumatic stress disorder)      risperiDONE (RISPERDAL) 1 MG tablet Take 1 tablet (1 mg) by mouth 2 times daily  Qty: 30 tablet, Refills: 0    Associated Diagnoses: Borderline personality disorder (H)           CONTINUE these medications which have CHANGED    Details   desvenlafaxine (PRISTIQ) 50 MG 24 hr tablet Take 1 tablet (50 mg) by mouth daily  Qty: 30 tablet, Refills: 0    Associated Diagnoses: Episode of recurrent major depressive disorder, unspecified depression episode severity (H24); Disassociation      gabapentin (NEURONTIN) 100 MG capsule Take 2 capsules (200 mg) by mouth every 8 hours as needed for other (anxiety)  Qty: 30 capsule, Refills: 0    Associated Diagnoses: TIGIST (generalized anxiety disorder)      metFORMIN (GLUCOPHAGE XR) 500 MG 24 hr tablet Take 1 tablet (500 mg) by mouth daily (with dinner)  Qty: 30 tablet, Refills: 0    Associated Diagnoses: Type 2 diabetes mellitus without complication, without long-term current use of insulin (H)           CONTINUE these medications which have NOT CHANGED    Details   atorvastatin (LIPITOR) 20 MG tablet Take 1 tablet (20 mg) by mouth at bedtime  Qty: 30 tablet, Refills: 0    Associated Diagnoses: Other hyperlipidemia      melatonin 3 MG tablet Take 1 tablet (3 mg) by mouth nightly as needed for sleep  Qty: 30 tablet, Refills: 1    Associated Diagnoses: Insomnia, unspecified type      traZODone (DESYREL) 50 MG tablet Take  mg by mouth nightly as needed           STOP taking these medications       DULoxetine (CYMBALTA) 60 MG capsule Comments:   Reason for Stopping:         risperiDONE (RISPERDAL M-TABS) 0.5 MG ODT Comments:   Reason for Stopping:                 "Discharge Plan:   Medications as above  Psychiatric and Psychotherapy Appointments:  DBT Intake with [facility]  Appointment Date/Time:  at 11:00am  Provider: Mary Ann Ryan  Address: [address] [place] [address] [address]  [place] [address]  Phone Number: [phone]  Fax Number: [phone]    Psychiatry Appointment  Appointment Date/Time: Wednesday, May 1st at 11am  Provider: Krzysztof Walls, DNP, APRN, [email or web address]  Address: [address] [address]  Phone Number: [phone]  Fax Number: [phone]    Referrals: see above  Medical follow up: none, follow with the PCP     Attestations:     Patient was seen and discussed with attending physician, Dr. Frost , who agrees with the assessment and plan.       Dorothy \"Jesi\" MD Og  PGY 2, Psychiatry Resident     The patient has been seen and evaluated by me, Mario Frost . I have examined the patient today and reviewed the discharge plan with the resident. I agree with the final assessment and plan, as noted in the discharge summary. I have reviewed today's vital signs, medications, labs and imaging.    Total time discharge plannin minutes      Mario Rudd MD    "

## 2024-04-22 NOTE — PLAN OF CARE
Sveta noted to have slept for the approximately 6.75 hours without distress   No endorsement of pain or suicidal thought tonight.  Pt stays calm without an outburst tonight.    Problem: Sleep Disturbance  Goal: Adequate Sleep/Rest  4/22/2024 0612 by Esthela Castle, RN  Outcome: Progressing  4/22/2024 0324 by Esthela Castle RN  Outcome: Progressing     Problem: Adult Behavioral Health Plan of Care  Goal: Adheres to Safety Considerations for Self and Others  Intervention: Develop and Maintain Individualized Safety Plan  Recent Flowsheet Documentation  Taken 4/22/2024 0322 by Esthela Castle, RN  Safety Measures: safety rounds completed  Goal: Absence of New-Onset Illness or Injury  Intervention: Identify and Manage Fall Risk  Recent Flowsheet Documentation  Taken 4/22/2024 0322 by Esthela Castle, RN  Safety Measures: safety rounds completed   Goal Outcome Evaluation:

## 2024-07-14 ENCOUNTER — HEALTH MAINTENANCE LETTER (OUTPATIENT)
Age: 39
End: 2024-07-14

## 2024-09-22 ENCOUNTER — HEALTH MAINTENANCE LETTER (OUTPATIENT)
Age: 39
End: 2024-09-22

## 2025-01-12 ENCOUNTER — HEALTH MAINTENANCE LETTER (OUTPATIENT)
Age: 40
End: 2025-01-12

## 2025-04-26 ENCOUNTER — HEALTH MAINTENANCE LETTER (OUTPATIENT)
Age: 40
End: 2025-04-26

## 2025-07-19 ENCOUNTER — HEALTH MAINTENANCE LETTER (OUTPATIENT)
Age: 40
End: 2025-07-19

## 2025-08-09 ENCOUNTER — HEALTH MAINTENANCE LETTER (OUTPATIENT)
Age: 40
End: 2025-08-09